# Patient Record
Sex: MALE | Race: WHITE | NOT HISPANIC OR LATINO
[De-identification: names, ages, dates, MRNs, and addresses within clinical notes are randomized per-mention and may not be internally consistent; named-entity substitution may affect disease eponyms.]

---

## 2021-08-25 ENCOUNTER — TRANSCRIPTION ENCOUNTER (OUTPATIENT)
Age: 41
End: 2021-08-25

## 2021-08-26 ENCOUNTER — INPATIENT (INPATIENT)
Facility: HOSPITAL | Age: 41
LOS: 4 days | Discharge: HOME | DRG: 286 | End: 2021-08-31
Attending: THORACIC SURGERY (CARDIOTHORACIC VASCULAR SURGERY) | Admitting: THORACIC SURGERY (CARDIOTHORACIC VASCULAR SURGERY)
Payer: COMMERCIAL

## 2021-08-26 ENCOUNTER — NON-APPOINTMENT (OUTPATIENT)
Age: 41
End: 2021-08-26

## 2021-08-26 ENCOUNTER — APPOINTMENT (OUTPATIENT)
Dept: CARDIOLOGY | Facility: CLINIC | Age: 41
End: 2021-08-26
Payer: COMMERCIAL

## 2021-08-26 ENCOUNTER — LABORATORY RESULT (OUTPATIENT)
Age: 41
End: 2021-08-26

## 2021-08-26 VITALS — DIASTOLIC BLOOD PRESSURE: 68 MMHG | SYSTOLIC BLOOD PRESSURE: 114 MMHG

## 2021-08-26 VITALS
WEIGHT: 169.98 LBS | HEIGHT: 72 IN | HEART RATE: 101 BPM | TEMPERATURE: 99 F | DIASTOLIC BLOOD PRESSURE: 85 MMHG | SYSTOLIC BLOOD PRESSURE: 126 MMHG | OXYGEN SATURATION: 97 % | RESPIRATION RATE: 18 BRPM

## 2021-08-26 VITALS
BODY MASS INDEX: 23.03 KG/M2 | WEIGHT: 170 LBS | HEIGHT: 72 IN | HEART RATE: 95 BPM | SYSTOLIC BLOOD PRESSURE: 120 MMHG | OXYGEN SATURATION: 95 % | DIASTOLIC BLOOD PRESSURE: 70 MMHG

## 2021-08-26 DIAGNOSIS — R07.9 CHEST PAIN, UNSPECIFIED: ICD-10-CM

## 2021-08-26 DIAGNOSIS — I34.0 NONRHEUMATIC MITRAL (VALVE) INSUFFICIENCY: ICD-10-CM

## 2021-08-26 DIAGNOSIS — R50.9 FEVER, UNSPECIFIED: ICD-10-CM

## 2021-08-26 DIAGNOSIS — I30.9 ACUTE PERICARDITIS, UNSPECIFIED: ICD-10-CM

## 2021-08-26 LAB
ALBUMIN SERPL ELPH-MCNC: 3.7 G/DL — SIGNIFICANT CHANGE UP (ref 3.3–5)
ALP SERPL-CCNC: 53 U/L — SIGNIFICANT CHANGE UP (ref 40–120)
ALT FLD-CCNC: 8 U/L — LOW (ref 10–45)
ANION GAP SERPL CALC-SCNC: 13 MMOL/L — SIGNIFICANT CHANGE UP (ref 5–17)
APPEARANCE UR: CLEAR — SIGNIFICANT CHANGE UP
AST SERPL-CCNC: 10 U/L — SIGNIFICANT CHANGE UP (ref 10–40)
BASOPHILS # BLD AUTO: 0.02 K/UL — SIGNIFICANT CHANGE UP (ref 0–0.2)
BASOPHILS NFR BLD AUTO: 0.3 % — SIGNIFICANT CHANGE UP (ref 0–2)
BILIRUB SERPL-MCNC: 0.6 MG/DL — SIGNIFICANT CHANGE UP (ref 0.2–1.2)
BILIRUB UR-MCNC: NEGATIVE — SIGNIFICANT CHANGE UP
BLD GP AB SCN SERPL QL: NEGATIVE — SIGNIFICANT CHANGE UP
BUN SERPL-MCNC: 17 MG/DL — SIGNIFICANT CHANGE UP (ref 7–23)
CALCIUM SERPL-MCNC: 9.1 MG/DL — SIGNIFICANT CHANGE UP (ref 8.4–10.5)
CHLORIDE SERPL-SCNC: 104 MMOL/L — SIGNIFICANT CHANGE UP (ref 96–108)
CK SERPL-CCNC: 61 U/L — SIGNIFICANT CHANGE UP (ref 30–200)
CO2 SERPL-SCNC: 22 MMOL/L — SIGNIFICANT CHANGE UP (ref 22–31)
COLOR SPEC: YELLOW — SIGNIFICANT CHANGE UP
CREAT SERPL-MCNC: 0.91 MG/DL — SIGNIFICANT CHANGE UP (ref 0.5–1.3)
CRP SERPL-MCNC: 55 MG/L — HIGH (ref 0–4)
CRP SERPL-MCNC: 62 MG/L — HIGH (ref 0–4)
D DIMER BLD IA.RAPID-MCNC: 446 NG/ML DDU — HIGH
DIFF PNL FLD: NEGATIVE — SIGNIFICANT CHANGE UP
EOSINOPHIL # BLD AUTO: 0.04 K/UL — SIGNIFICANT CHANGE UP (ref 0–0.5)
EOSINOPHIL NFR BLD AUTO: 0.6 % — SIGNIFICANT CHANGE UP (ref 0–6)
ERYTHROCYTE [SEDIMENTATION RATE] IN BLOOD: 36 MM/HR — HIGH (ref 0–15)
GAS PNL BLDA: SIGNIFICANT CHANGE UP
GLUCOSE SERPL-MCNC: 101 MG/DL — HIGH (ref 70–99)
GLUCOSE UR QL: NEGATIVE — SIGNIFICANT CHANGE UP
HCT VFR BLD CALC: 41.2 % — SIGNIFICANT CHANGE UP (ref 39–50)
HGB BLD-MCNC: 13.8 G/DL — SIGNIFICANT CHANGE UP (ref 13–17)
IMM GRANULOCYTES NFR BLD AUTO: 0.1 % — SIGNIFICANT CHANGE UP (ref 0–1.5)
KETONES UR-MCNC: ABNORMAL
LEUKOCYTE ESTERASE UR-ACNC: NEGATIVE — SIGNIFICANT CHANGE UP
LYMPHOCYTES # BLD AUTO: 0.71 K/UL — LOW (ref 1–3.3)
LYMPHOCYTES # BLD AUTO: 9.8 % — LOW (ref 13–44)
MCHC RBC-ENTMCNC: 29.2 PG — SIGNIFICANT CHANGE UP (ref 27–34)
MCHC RBC-ENTMCNC: 33.5 GM/DL — SIGNIFICANT CHANGE UP (ref 32–36)
MCV RBC AUTO: 87.1 FL — SIGNIFICANT CHANGE UP (ref 80–100)
MONOCYTES # BLD AUTO: 0.41 K/UL — SIGNIFICANT CHANGE UP (ref 0–0.9)
MONOCYTES NFR BLD AUTO: 5.7 % — SIGNIFICANT CHANGE UP (ref 2–14)
NEUTROPHILS # BLD AUTO: 6.06 K/UL — SIGNIFICANT CHANGE UP (ref 1.8–7.4)
NEUTROPHILS NFR BLD AUTO: 83.5 % — HIGH (ref 43–77)
NITRITE UR-MCNC: NEGATIVE — SIGNIFICANT CHANGE UP
NRBC # BLD: 0 /100 WBCS — SIGNIFICANT CHANGE UP (ref 0–0)
NT-PROBNP SERPL-SCNC: 1454 PG/ML — HIGH (ref 0–300)
PH UR: 6.5 — SIGNIFICANT CHANGE UP (ref 5–8)
PLATELET # BLD AUTO: 244 K/UL — SIGNIFICANT CHANGE UP (ref 150–400)
POTASSIUM SERPL-MCNC: 4 MMOL/L — SIGNIFICANT CHANGE UP (ref 3.5–5.3)
POTASSIUM SERPL-SCNC: 4 MMOL/L — SIGNIFICANT CHANGE UP (ref 3.5–5.3)
PROT SERPL-MCNC: 6.3 G/DL — SIGNIFICANT CHANGE UP (ref 6–8.3)
PROT UR-MCNC: 100 — SIGNIFICANT CHANGE UP
RAPID RVP RESULT: SIGNIFICANT CHANGE UP
RBC # BLD: 4.73 M/UL — SIGNIFICANT CHANGE UP (ref 4.2–5.8)
RBC # FLD: 11.6 % — SIGNIFICANT CHANGE UP (ref 10.3–14.5)
RH IG SCN BLD-IMP: POSITIVE — SIGNIFICANT CHANGE UP
RH IG SCN BLD-IMP: POSITIVE — SIGNIFICANT CHANGE UP
SARS-COV-2 RNA SPEC QL NAA+PROBE: SIGNIFICANT CHANGE UP
SARS-COV-2 RNA SPEC QL NAA+PROBE: SIGNIFICANT CHANGE UP
SODIUM SERPL-SCNC: 139 MMOL/L — SIGNIFICANT CHANGE UP (ref 135–145)
SP GR SPEC: >1.05 (ref 1.01–1.02)
TROPONIN T, HIGH SENSITIVITY RESULT: 9 NG/L — SIGNIFICANT CHANGE UP (ref 0–51)
UROBILINOGEN FLD QL: NEGATIVE — SIGNIFICANT CHANGE UP
WBC # BLD: 7.25 K/UL — SIGNIFICANT CHANGE UP (ref 3.8–10.5)
WBC # FLD AUTO: 7.25 K/UL — SIGNIFICANT CHANGE UP (ref 3.8–10.5)

## 2021-08-26 PROCEDURE — 99291 CRITICAL CARE FIRST HOUR: CPT

## 2021-08-26 PROCEDURE — 71046 X-RAY EXAM CHEST 2 VIEWS: CPT | Mod: 26

## 2021-08-26 PROCEDURE — 99205 OFFICE O/P NEW HI 60 MIN: CPT | Mod: 25

## 2021-08-26 PROCEDURE — 93010 ELECTROCARDIOGRAM REPORT: CPT

## 2021-08-26 PROCEDURE — 93306 TTE W/DOPPLER COMPLETE: CPT | Mod: 26

## 2021-08-26 PROCEDURE — 93306 TTE W/DOPPLER COMPLETE: CPT

## 2021-08-26 PROCEDURE — 93010 ELECTROCARDIOGRAM REPORT: CPT | Mod: NC

## 2021-08-26 PROCEDURE — 99222 1ST HOSP IP/OBS MODERATE 55: CPT

## 2021-08-26 PROCEDURE — 71275 CT ANGIOGRAPHY CHEST: CPT | Mod: 26

## 2021-08-26 PROCEDURE — 99223 1ST HOSP IP/OBS HIGH 75: CPT

## 2021-08-26 PROCEDURE — 93000 ELECTROCARDIOGRAM COMPLETE: CPT

## 2021-08-26 PROCEDURE — 99285 EMERGENCY DEPT VISIT HI MDM: CPT

## 2021-08-26 PROCEDURE — 99255 IP/OBS CONSLTJ NEW/EST HI 80: CPT

## 2021-08-26 RX ORDER — FUROSEMIDE 40 MG
20 TABLET ORAL DAILY
Refills: 0 | Status: DISCONTINUED | OUTPATIENT
Start: 2021-08-26 | End: 2021-08-27

## 2021-08-26 RX ORDER — CHLORHEXIDINE GLUCONATE 213 G/1000ML
1 SOLUTION TOPICAL ONCE
Refills: 0 | Status: COMPLETED | OUTPATIENT
Start: 2021-08-26 | End: 2021-08-26

## 2021-08-26 RX ORDER — VANCOMYCIN HCL 1 G
1250 VIAL (EA) INTRAVENOUS EVERY 12 HOURS
Refills: 0 | Status: DISCONTINUED | OUTPATIENT
Start: 2021-08-27 | End: 2021-08-30

## 2021-08-26 RX ORDER — PANTOPRAZOLE SODIUM 20 MG/1
40 TABLET, DELAYED RELEASE ORAL DAILY
Refills: 0 | Status: DISCONTINUED | OUTPATIENT
Start: 2021-08-26 | End: 2021-08-31

## 2021-08-26 RX ORDER — IBUPROFEN 200 MG
600 TABLET ORAL ONCE
Refills: 0 | Status: COMPLETED | OUTPATIENT
Start: 2021-08-26 | End: 2021-08-26

## 2021-08-26 RX ORDER — CEFTRIAXONE 500 MG/1
1000 INJECTION, POWDER, FOR SOLUTION INTRAMUSCULAR; INTRAVENOUS ONCE
Refills: 0 | Status: COMPLETED | OUTPATIENT
Start: 2021-08-26 | End: 2021-08-26

## 2021-08-26 RX ORDER — CEFTRIAXONE 500 MG/1
2000 INJECTION, POWDER, FOR SOLUTION INTRAMUSCULAR; INTRAVENOUS EVERY 24 HOURS
Refills: 0 | Status: DISCONTINUED | OUTPATIENT
Start: 2021-08-27 | End: 2021-08-31

## 2021-08-26 RX ORDER — POLYETHYLENE GLYCOL 3350 17 G/17G
17 POWDER, FOR SOLUTION ORAL DAILY
Refills: 0 | Status: DISCONTINUED | OUTPATIENT
Start: 2021-08-26 | End: 2021-08-31

## 2021-08-26 RX ORDER — PIPERACILLIN AND TAZOBACTAM 4; .5 G/20ML; G/20ML
3.38 INJECTION, POWDER, LYOPHILIZED, FOR SOLUTION INTRAVENOUS ONCE
Refills: 0 | Status: DISCONTINUED | OUTPATIENT
Start: 2021-08-26 | End: 2021-08-26

## 2021-08-26 RX ORDER — CEFTRIAXONE 500 MG/1
INJECTION, POWDER, FOR SOLUTION INTRAMUSCULAR; INTRAVENOUS
Refills: 0 | Status: DISCONTINUED | OUTPATIENT
Start: 2021-08-26 | End: 2021-08-26

## 2021-08-26 RX ORDER — VANCOMYCIN HCL 1 G
1000 VIAL (EA) INTRAVENOUS ONCE
Refills: 0 | Status: COMPLETED | OUTPATIENT
Start: 2021-08-26 | End: 2021-08-26

## 2021-08-26 RX ORDER — ENOXAPARIN SODIUM 100 MG/ML
40 INJECTION SUBCUTANEOUS DAILY
Refills: 0 | Status: DISCONTINUED | OUTPATIENT
Start: 2021-08-26 | End: 2021-08-30

## 2021-08-26 RX ORDER — FUROSEMIDE 40 MG
20 TABLET ORAL ONCE
Refills: 0 | Status: COMPLETED | OUTPATIENT
Start: 2021-08-26 | End: 2021-08-26

## 2021-08-26 RX ORDER — SODIUM CHLORIDE 9 MG/ML
3 INJECTION INTRAMUSCULAR; INTRAVENOUS; SUBCUTANEOUS EVERY 8 HOURS
Refills: 0 | Status: DISCONTINUED | OUTPATIENT
Start: 2021-08-26 | End: 2021-08-31

## 2021-08-26 RX ORDER — CHLORHEXIDINE GLUCONATE 213 G/1000ML
15 SOLUTION TOPICAL ONCE
Refills: 0 | Status: DISCONTINUED | OUTPATIENT
Start: 2021-08-26 | End: 2021-08-29

## 2021-08-26 RX ORDER — SPIRONOLACTONE 25 MG/1
25 TABLET, FILM COATED ORAL DAILY
Refills: 0 | Status: DISCONTINUED | OUTPATIENT
Start: 2021-08-26 | End: 2021-08-27

## 2021-08-26 RX ADMIN — SODIUM CHLORIDE 3 MILLILITER(S): 9 INJECTION INTRAMUSCULAR; INTRAVENOUS; SUBCUTANEOUS at 21:48

## 2021-08-26 RX ADMIN — Medication 600 MILLIGRAM(S): at 13:28

## 2021-08-26 RX ADMIN — CEFTRIAXONE 100 MILLIGRAM(S): 500 INJECTION, POWDER, FOR SOLUTION INTRAMUSCULAR; INTRAVENOUS at 14:21

## 2021-08-26 RX ADMIN — Medication 600 MILLIGRAM(S): at 13:58

## 2021-08-26 RX ADMIN — Medication 20 MILLIGRAM(S): at 23:15

## 2021-08-26 RX ADMIN — Medication 20 MILLIGRAM(S): at 17:59

## 2021-08-26 RX ADMIN — Medication 250 MILLIGRAM(S): at 15:23

## 2021-08-26 RX ADMIN — PIPERACILLIN AND TAZOBACTAM 200 GRAM(S): 4; .5 INJECTION, POWDER, LYOPHILIZED, FOR SOLUTION INTRAVENOUS at 13:37

## 2021-08-26 RX ADMIN — SPIRONOLACTONE 25 MILLIGRAM(S): 25 TABLET, FILM COATED ORAL at 17:59

## 2021-08-26 RX ADMIN — CHLORHEXIDINE GLUCONATE 1 APPLICATION(S): 213 SOLUTION TOPICAL at 21:48

## 2021-08-26 RX ADMIN — CEFTRIAXONE 100 MILLIGRAM(S): 500 INJECTION, POWDER, FOR SOLUTION INTRAMUSCULAR; INTRAVENOUS at 18:00

## 2021-08-26 NOTE — H&P ADULT - PROBLEM SELECTOR PLAN 1
admit to  Dr Rodrigues  SD[facility] [place]  Beta blocker on hold   CATHY  ID Consult- Dr Dwyer admit to  Dr Rodrigues  SD[facility] [place]  Flail posterior mitral leaflet - appears to be a torn  chordae attached.*Torrential, severe, anteriorly-directed mitral  regurgitation.*  Beta blocker on hold   CATHY  ID Consult- Dr Dwyer

## 2021-08-26 NOTE — REVIEW OF SYSTEMS
[Negative] : Heme/Lymph [SOB] : no shortness of breath [Dyspnea on exertion] : not dyspnea during exertion

## 2021-08-26 NOTE — H&P ADULT - HISTORY OF PRESENT ILLNESS
This is  a well groomed well developed 40y/o  male in general good health. Developed fever t max 104 x 48hrs presented to Urgent care on cardiac exam found to have + Murmur.  Seen by Dr. Lisker ( Cardiologist) this am transthoracic ECHO reveals  flail mitral leaflet instructed to present to [facility] ED for MV endocarditis.  Denies travel, Covid, sick contacts CP/PALPs and  dental work. Denies N/V/CP. Endorses orthopnea.   Admitted to Dr Rodrigues for  further management.  This is  a well groomed well developed 42y/o  male in general good health. Developed fever t max 104 x 48hrs presented to Urgent care on cardiac exam found to have + Murmur.  Seen by Dr. Lisker ( Cardiologist) this am transthoracic ECHO reveals  severe  mitral prolapse  instructed to present to [facility] ED for MV endocarditis.  Denies travel, Covid, sick contacts CP/PALPs and  dental work. Denies N/V/CP. Endorses orthopnea.   Admitted to Dr Rodrigues for  further management.  This is  a well groomed well developed 42y/o  male in general good health. Developed fever t max 104 x 48hrs presented to Urgent care on cardiac exam found to have + Murmur.  Seen by Dr. Lisker ( Cardiologist) this am transthoracic ECHO reveals  severe  mitral  prolapse  instructed to present to [facility] ED for MV endocarditis.  Denies travel, Covid, sick contacts CP/PALPs and  dental work. Denies N/V/CP. Endorses orthopnea.   Admitted to Dr Rodrigues for  further management.  This is  a well groomed well developed 40y/o  male in general good health. Developed fever t max 104 x 48hrs presented to Urgent care on cardiac exam found to have + Murmur.  Seen by Dr. Lisker ( Cardiologist) this am transthoracic ECHO reveals  severe  mitral  torn anterior chordae   severe  torrential MR   instructed to present to [facility] ED for MV endocarditis.  Denies travel, Covid, sick contacts CP/PALPs and  dental work. Denies N/V/CP. Endorses orthopnea.   Admitted to Dr Rodrigues for  further management.

## 2021-08-26 NOTE — ED CLERICAL - NS ED CLERK NOTE PRE-ARRIVAL INFORMATION; ADDITIONAL PRE-ARRIVAL INFORMATION
CC/Reason For referral:  r/o endocarditis, needs blood cultures  Preferred Consultant(if applicable):  Who admits for you (if needed):  Do you have documents you would like to fax over?  Would you still like to speak to an ED attending?  please call after patient is seen

## 2021-08-26 NOTE — ED PROVIDER NOTE - ATTENDING CONTRIBUTION TO CARE
40yo M denies pmhx presents to ER sent by cardiologist Dr. Jay Lisker for abnormal echo.  Patient reports about 1 week ago began having fevers (Tmax 104F), generalized chest tightness and SOB for the past week, postional, vss, well appearing, vss, ekg nl, seen by cards for admission for endocarditis work up with murmur and valve issue seen on outpt echo, iv abx, cultures ordered, labs.

## 2021-08-26 NOTE — H&P ADULT - ASSESSMENT
This is  a well groomed well developed 40y/o  male in general good health. Developed fever t max 104 x 48hrs presented to Urgent care on cardiac exam found to have + Murmur.  Seen by Dr. Lisker ( Cardiologist) this am transthoracic ECHO reveals  flail mitral leaflet instructed to present to [facility] ED for MV endocarditis.  Denies travel, Covid, sick contacts CP/PALPs and  dental work. Denies N/V/CP. Endorses orthopnea.   Admitted to Dr Rodrigues for  further management.   Seen and examined in no acute distress.  Dr Dwyer ID consulted blood cultures x 2 ordered empiric antibiotics Vancomycin and Zosyn.  Beta blocker on hold allow permissive tachycardia.  Plan of care discussed with patient and Dr Rodrigues     This is  a well groomed well developed 42y/o  male in general good health. Developed fever t max 104 x 48hrs presented to Urgent care on cardiac exam found to have + Murmur.  Seen by Dr. Lisker ( Cardiologist) this am transthoracic ECHO reveals severe mitral prolapse leaflet instructed to present to [facility] ED for MV endocarditis.  Denies travel, Covid, sick contacts CP/PALPs and  dental work. Denies N/V/CP. Endorses orthopnea.   Admitted to Dr Rodrigues for  further management.   Seen and examined in no acute distress.  Dr Dwyer ID consulted blood cultures x 2 ordered empiric antibiotics Vancomycin and Zosyn.  Beta blocker on hold allow permissive tachycardia.  Plan of care discussed with patient and Dr Rodrigues     This is  a well groomed well developed 40y/o  male in general good health. Developed fever t max 104 x 48hrs presented to Urgent care on cardiac exam found to have + Murmur.  Seen by Dr. Lisker ( Cardiologist) this am transthoracic ECHO reveals severe mitral prolapse leaflet instructed to present to [facility] ED for MV endocarditis.  Denies travel, Covid, sick contacts CP/PALPs and  dental work. Denies N/V/CP. Endorses orthopnea.   Admitted to Dr Rodrigues for  further management.   Seen and examined in no acute distress.  Dr Dwyer ID consulted blood cultures x 2 ordered empiric antibiotics Vancomycin and Zosyn.  Beta blocker on hold allow permissive tachycardia.  Plan of care discussed with patient and Dr Rodrigues      Attending statement  Examined and interviewed patient.  plan to start antibiotics and plan on valve surgery when optimized    Dennis Rodrigues MD     This is  a well groomed well developed 42y/o  male in general good health. Developed fever t max 104 x 48hrs presented to Urgent care on cardiac exam found to have + Murmur.  Seen by Dr. Lisker ( Cardiologist) this am transthoracic ECHO reveals severe mitral prolapse leaflet instructed to present to [facility] ED for MV endocarditis.  Denies travel, Covid, sick contacts CP/PALPs and  dental work. Denies N/V/CP. Endorses orthopnea.   Admitted to Dr Rodrigues for  further management.   Seen and examined in no acute distress.  Dr Dwyer ID consulted blood cultures x 2 ordered empiric antibiotics Vancomycin and Ceftriaxone  Beta blocker on hold allow permissive tachycardia.  Plan of care discussed with patient and Dr Rodrigues      Attending statement  Examined and interviewed patient.  plan to start antibiotics and plan on valve surgery when optimized    Dennis Rodrigues MD     This is  a well groomed well developed 42y/o  male in general good health. Developed fever t max 104 x 48hrs presented to Urgent care on cardiac exam found to have + Murmur.  Seen by Dr. Lisker ( Cardiologist) this am transthoracic ECHO reveals severe mitral prolapse leaflet instructed to present to [facility] ED for MV endocarditis.   Received Pfizer COVID Vaccinex2 last dose April -Denies travel, Covid, sick contacts CP/PALPs and  dental work. Denies N/V/CP.  Endorses orthopnea.   Admitted to Dr Rodrigues for  further management.   Seen and examined in no acute distress.  Dr Dwyer ID consulted blood cultures x 2 ordered empiric antibiotics Vancomycin and Ceftriaxone  Beta blocker on hold allow permissive tachycardia.  Plan of care discussed with patient and Dr Rodrigues      Attending statement  Examined and interviewed patient.  plan to start antibiotics and plan on valve surgery when optimized    Dennis Rodrigues MD     This is  a well groomed well developed 40y/o  male in general good health. Developed fever t max 104 x 48hrs presented to Urgent care on cardiac exam found to have + Murmur.  Seen by Dr. Lisker ( Cardiologist) this am transthoracic ECHO reveals severe Flail posterior mitral leaflet - appears to be a torn chordae attached.*Torrential, severe, anteriorly-directed mitral  regurgitation.*  instructed to present to [facility] ED for MV endocarditis.   Received Pfizer COVID Vaccinex2 last dose April -Denies travel, Covid, sick contacts CP/PALPs and  dental work. Denies N/V/CP.  Endorses orthopnea.   Admitted to Dr Rodrigues for  further management.   Seen and examined in no acute distress.  Dr Dwyer ID consulted blood cultures x 2 ordered empiric antibiotics Vancomycin and Ceftriaxone  Beta blocker on hold allow permissive tachycardia.  Plan of care discussed with patient and Dr Rodrigues      Attending statement  Examined and interviewed patient.  plan to start antibiotics and plan on valve surgery when optimized    Dennis Rodrigues MD

## 2021-08-26 NOTE — H&P ADULT - NSHPPHYSICALEXAM_GEN_ALL_CORE
Constitutional:  Well developed, well nourished  Eyes: EOMI,PERRL  ENMT: WDL  Neck: no bruits ,no thyromegaly  Breasts:not examined  Back: no deformities no kyphosis  Respiratory: Breath  sounds equal & clear throughout  Cardiovascular:  S1 S2 RRR + 2/6  murmurs rubs  Gastrointestinal: oft nontender positive bowels sounds   Genitourinary: not examined  Rectal: not examined  Extremities: + pedal pulse no edema  Vascular: Equal and normal throughout  Neurological :Alert and oriented no focal deficits  Skin: normal no rashes   Lymph Nodes: non tender   Musculoskeletal: equal  strength throughout

## 2021-08-26 NOTE — PHYSICAL EXAM
[Well Developed] : well developed [Well Nourished] : well nourished [No Acute Distress] : no acute distress [Normal Conjunctiva] : normal conjunctiva [Normal Venous Pressure] : normal venous pressure [Normal S1, S2] : normal S1, S2 [Clear Lung Fields] : clear lung fields [Good Air Entry] : good air entry [Soft] : abdomen soft [Non Tender] : non-tender [Normal Gait] : normal gait [No Edema] : no edema [No Rash] : no rash [Moves all extremities] : moves all extremities [Alert and Oriented] : alert and oriented [Normal memory] : normal memory [de-identified] : There is a loud diffuse holosystolic murmur throughout the precordium. [de-identified] : Decreased breath sounds at the bases bilaterally.

## 2021-08-26 NOTE — H&P ADULT - NSHPLABSRESULTS_GEN_ALL_CORE
Vital Signs Last 24 Hrs  T(C): 37.4 (26 Aug 2021 12:09), Max: 37.4 (26 Aug 2021 12:09)  T(F): 99.3 (26 Aug 2021 12:09), Max: 99.3 (26 Aug 2021 12:09)  HR: 101 (26 Aug 2021 12:09) (101 - 101)  BP: 126/85 (26 Aug 2021 12:09) (126/85 - 126/85)  RR: 18 (26 Aug 2021 12:09) (18 - 18)  SpO2: 97% (26 Aug 2021 12:09) (97% - 97%) Vital Signs Last 24 Hrs  T(C): 37.4 (26 Aug 2021 12:09), Max: 37.4 (26 Aug 2021 12:09)  T(F): 99.3 (26 Aug 2021 12:09), Max: 99.3 (26 Aug 2021 12:09)  HR: 101 (26 Aug 2021 12:09) (101 - 101)  BP: 126/85 (26 Aug 2021 12:09) (126/85 - 126/85)  RR: 18 (26 Aug 2021 12:09) (18 - 18)  SpO2: 97% (26 Aug 2021 12:09) (97% - 97%)    < from: Transthoracic Echocardiogram (08.26.21 @ 15:11) >    Conclusions:  1. Flail posterior mitral leaflet - appears to be a torn  chordae attached.  *Torrential, severe, anteriorly-directed mitral  regurgitation.*  2. Severely dilated left atrium.  LA volume index = 84  cc/m2.  3. Normal left ventricular internal dimensions and wall  thicknesses.  4. Normal left ventricular systolic function. No segmental  wall motion abnormalities.  5. Normal right ventricular size and function.  6. Bilateral pleural effusions.  *** No previous Echo exam.    < end of copied text >

## 2021-08-26 NOTE — PROGRESS NOTE ADULT - SUBJECTIVE AND OBJECTIVE BOX
Patient seen and examined at the bedside.    Remained critically ill on continuous ICU monitoring.    OBJECTIVE:  Vital Signs Last 24 Hrs  T(C): 36.7 (26 Aug 2021 15:30), Max: 37.4 (26 Aug 2021 12:09)  T(F): 98.1 (26 Aug 2021 15:30), Max: 99.3 (26 Aug 2021 12:09)  HR: 96 (26 Aug 2021 16:00) (96 - 101)  BP: 113/72 (26 Aug 2021 16:00) (113/72 - 134/89)  BP(mean): 87 (26 Aug 2021 16:00) (87 - 91)  RR: 26 (26 Aug 2021 16:00) (18 - 31)  SpO2: 94% (26 Aug 2021 16:00) (92% - 97%)    REVIEW OF SYSTEMS:  Constitutional:     [] negative [x] fevers [] chills []fatigue            HEENT:               [x] negative [] difficulty hearing []vertigo []epistaxis     CV:                     [] negative [x] chest pain[] incisional chest pain [] edema                 Resp:                  [] negative [] wheezing [x] SOB [] cough [] hemoptysis    GI:                      [x] negative [] nausea [] vomiting []  diarrhea[] constipation [] melena         :                     [x] negative [ ] hematuria [ ] dysuria[ ] urgency [] incontinence         Musculoskeletal:  [x] negative [ ] back pain [ ] myalgias [ ] arthralgias [ ] fracture  Skin:                   [x] negative [ ] rash [ ] itch  Neurological:      [x] negative []seizures []syncope []confusion    [x] All other systems negative  [ ] Unable to assess ROS due to    Physical Exam:  General:   Neurology: A&Ox3, nonfocal, MADDEN x 4  Eyes: PERRLA/ EOMI, Gross vision intact  ENT/Neck: Neck supple, trachea midline, No JVD, Gross hearing intact  Respiratory: No wheezing, rhonchi. Rales noted bilaterally.  CV: RRR, S1S2, no murmurs, rubs or gallops        [] Sternal dressing, [] Mediastinal CT, [] Pleural CT, [] PATRICIA drain        [x] Sinus rhythm, [] Afib, [] Temporary pacing, [] PPM  Abdominal: Soft, NT, ND +BS,   Extremities: 1-2+ pedal edema noted, + peripheral pulses  Skin: No Rashes, Hematoma, Ecchymosis                         LINES:  [ ] Arterial Line   [ ] Central Line [x] Peripheral Intravenous Line  [ ] PA catheter  [ ] IABP  [ ] ECMO  [ ] LVAD  [ ] Ventilator  [ ] pacemaker [ ] Impella      RADIOLOGY:    Assessment:  40y/o  male in general good health, no previous medical history.     Endocarditis Preop MVR  Mitral valve incompetence    Plan:   ***Neuro***  [x] Nonfocal  [] Sedated  [] Motor Strength  [] Speech    ***Cardiovascular***  Endocarditis Preop MVR  Severe MR on outpatient echo    Hematocrit 41.2%   Transfuse pRBCs if remain hemodynamically unstable   SR / no back up pacing / monitor for Rhythm abnormalities    Hemodynamic lability, instability. Requires IVCD []  []Epi  []Primacor  []Levo  [] Vaso  [] Cardene  [] Nitroprusside  Anticoagulation: [] Heparin  [] Argatroban [x] Lovenox  [] Coumadin // [] HIT positive [] ABRAHAM positive  Antiplatelet therapy: [] ASA  [] Brilinta  [] Plavix   Chest tubes: [] actively bleeding [x] non-bleeding    CHF- acute [ ]   chronic [ ]    systolic [x]   diastolic [ ]          - Echo- EF -     59%      ***Pulmonary***  Ventilator Management:  []AC-rest  [] CPAP-PS Wean  []Trach Collar  []Extubate  [] T-Piece  []peep>5             Receiving supplemental O2 therapy with NC LPM: 2  Continue to monitor RR, breathing pattern, pulse ox, and ABG's.  Encourage incentive spirometry.     ***GI***  Stress ulcer prophylaxis: [x] Protonix  [] Pepcid  NPO for preop MVR.    ***Renal***  Continue to monitor I/Os, BUN/Creatinine.   Replete lytes PRN. Keep K> 4 and Mg >2.    I&O's Summary    ***ID***  TMAX: 99.3F, WBC within normal limits.   Empiric  Ceftriaxone for persistent fevers.     ***Endocrine***  [] Hyperglycemia  [] DM2 [] DM1 [] Prediabetes : HbA1c ____%             - [] Insulin gtt  [] ISS  [] NPH  [] Lantus             - Need tight glycemic control to prevent wound infection.    [] Acquired Hypothyroidism: Continue with Synthroid.      Patient requires continuous monitoring with bedside rhythm monitoring, pulse oximetry monitoring, and continuous central venous and arterial pressure monitoring; and intermittent blood gas analysis. Care plan discussed with the ICU care team.   Patient remained critical, at risk for life threatening decompensation.    I have spent 30 minutes providing critical care management to this patient.    By signing my name below, I, Lily Hou, attest that this documentation has been prepared under the direction and in the presence of Tani Jang MD   Electronically signed: Jarrod Francois, 21 @ 17:23    I, Tani Jang, personally performed the services described in this documentation. all medical record entries made by the scribe were at my direction and in my presence. I have reviewed the chart and agree that the record reflects my personal performance and is accurate and complete  Electronically signed: Tani Jang MD  Patient seen and examined at the bedside.    Remained critically ill on continuous ICU monitoring.    OBJECTIVE:  Vital Signs Last 24 Hrs  T(C): 36.7 (26 Aug 2021 15:30), Max: 37.4 (26 Aug 2021 12:09)  T(F): 98.1 (26 Aug 2021 15:30), Max: 99.3 (26 Aug 2021 12:09)  HR: 96 (26 Aug 2021 16:00) (96 - 101)  BP: 113/72 (26 Aug 2021 16:00) (113/72 - 134/89)  BP(mean): 87 (26 Aug 2021 16:00) (87 - 91)  RR: 26 (26 Aug 2021 16:00) (18 - 31)  SpO2: 94% (26 Aug 2021 16:00) (92% - 97%)    REVIEW OF SYSTEMS:    [ ] Unable to assess ROS due to    Physical Exam:  General:   Neurology: A&Ox3, nonfocal, MADDEN x 4  Eyes: PERRLA/ EOMI, Gross vision intact  ENT/Neck: Neck supple, trachea midline, No JVD, Gross hearing intact  Respiratory: No wheezing, rhonchi. Rales noted bilaterally.  CV: RRR, S1S2, no murmurs, rubs or gallops        [] Sternal dressing, [] Mediastinal CT, [] Pleural CT, [] APTRICIA drain        [x] Sinus rhythm, [] Afib, [] Temporary pacing, [] PPM  Abdominal: Soft, NT, ND +BS,   Extremities: 1-2+ pedal edema noted, + peripheral pulses  Skin: No Rashes, Hematoma, Ecchymosis                         LINES:  [ ] Arterial Line   [ ] Central Line [x] Peripheral Intravenous Line  [ ] PA catheter  [ ] IABP  [ ] ECMO  [ ] LVAD  [ ] Ventilator  [ ] pacemaker [ ] Impella      RADIOLOGY:    Assessment:  40y/o  male in general good health, no previous medical history.     Endocarditis Preop MVR  Mitral valve incompetence    Plan:   ***Neuro***  [x] Nonfocal  [] Sedated  [] Motor Strength  [] Speech    ***Cardiovascular***  Endocarditis Preop MVR  Severe MR on outpatient echo    Hematocrit 41.2%   Transfuse pRBCs if remain hemodynamically unstable   SR / no back up pacing / monitor for Rhythm abnormalities    Hemodynamic lability, instability. Requires IVCD []  []Epi  []Primacor  []Levo  [] Vaso  [] Cardene  [] Nitroprusside  Anticoagulation: [] Heparin  [] Argatroban [x] Lovenox  [] Coumadin // [] HIT positive [] ABRAHAM positive  Antiplatelet therapy: [] ASA  [] Brilinta  [] Plavix   Chest tubes: [] actively bleeding [x] non-bleeding    CHF- acute [ ]   chronic [ ]    systolic [x]   diastolic [ ]          - Echo- EF -     59%      ***Pulmonary***  Ventilator Management:  []AC-rest  [] CPAP-PS Wean  []Trach Collar  []Extubate  [] T-Piece  []peep>5             Receiving supplemental O2 therapy with NC LPM: 2  Continue to monitor RR, breathing pattern, pulse ox, and ABG's.  Encourage incentive spirometry.     ***GI***  Stress ulcer prophylaxis: [x] Protonix  [] Pepcid  NPO for preop MVR.    ***Renal***  Continue to monitor I/Os, BUN/Creatinine.   Replete lytes PRN. Keep K> 4 and Mg >2.    I&O's Summary    ***ID***  TMAX: 99.3F, WBC within normal limits.   Empiric  Ceftriaxone for persistent fevers.     ***Endocrine***  [] Hyperglycemia  [] DM2 [] DM1 [] Prediabetes : HbA1c ____%             - [] Insulin gtt  [] ISS  [] NPH  [] Lantus             - Need tight glycemic control to prevent wound infection.    [] Acquired Hypothyroidism: Continue with Synthroid.      Patient requires continuous monitoring with bedside rhythm monitoring, pulse oximetry monitoring, and continuous central venous and arterial pressure monitoring; and intermittent blood gas analysis. Care plan discussed with the ICU care team.   Patient remained critical, at risk for life threatening decompensation.    I have spent 30 minutes providing critical care management to this patient.    By signing my name below, I, Lily Hou, attest that this documentation has been prepared under the direction and in the presence of Tani Jang MD   Electronically signed: Jarrod Francois, 21 @ 17:23    I, Tani Jang, personally performed the services described in this documentation. all medical record entries made by the belaibe were at my direction and in my presence. I have reviewed the chart and agree that the record reflects my personal performance and is accurate and complete  Electronically signed: Tani Jang MD  Patient seen and examined at the bedside.    Remained critically ill on continuous ICU monitoring.    OBJECTIVE:  Vital Signs Last 24 Hrs  T(C): 36.7 (26 Aug 2021 15:30), Max: 37.4 (26 Aug 2021 12:09)  T(F): 98.1 (26 Aug 2021 15:30), Max: 99.3 (26 Aug 2021 12:09)  HR: 96 (26 Aug 2021 16:00) (96 - 101)  BP: 113/72 (26 Aug 2021 16:00) (113/72 - 134/89)  BP(mean): 87 (26 Aug 2021 16:00) (87 - 91)  RR: 26 (26 Aug 2021 16:00) (18 - 31)  SpO2: 94% (26 Aug 2021 16:00) (92% - 97%)    REVIEW OF SYSTEMS:    + SOB at rest / Fevers     Physical Exam:  General: in bed on continuous ICU observation   Neurology: A&Ox3, nonfocal, MADDEN x 4  Eyes: PERRLA/ EOMI, Gross vision intact  ENT/Neck: Neck supple, trachea midline, +JVD, Gross hearing intact  Respiratory: Rales noted bilaterally.  CV: RRR, S1S2,  + holosystolic murmur @ apex         Abdominal: Soft, NT, ND +BS,   Extremities: 1-2+ pedal edema noted, + peripheral pulses  Skin: No Rashes, Hematoma, Ecchymosis                         LINES:  [ ] Arterial Line   [ ] Central Line [x] Peripheral Intravenous Line  [ ] PA catheter  [ ] IABP  [ ] ECMO  [ ] LVAD  [ ] Ventilator  [ ] pacemaker [ ] Impella      RADIOLOGY:    Assessment:  42y/o  male in general good health, no previous medical history.     Endocarditis Preop MVR  Mitral valve incompetence    Plan:   ***Neuro***  [x] Nonfocal  [] Sedated  [] Motor Strength  [] Speech    ***Cardiovascular***  Endocarditis Preop MVR  Severe MR on outpatient echo    Hematocrit 41.2%   Transfuse pRBCs if remain hemodynamically unstable   SR / no back up pacing / monitor for Rhythm abnormalities    Hemodynamic lability, instability. Requires IVCD []  []Epi  []Primacor  []Levo  [] Vaso  [] Cardene  [] Nitroprusside  Anticoagulation: [] Heparin  [] Argatroban [x] Lovenox  [] Coumadin // [] HIT positive [] ABRAHAM positive  Antiplatelet therapy: [] ASA  [] Brilinta  [] Plavix   Chest tubes: [] actively bleeding [x] non-bleeding    CHF- acute [ ]   chronic [ ]    systolic [x]   diastolic [ ]          - Echo- EF -     59%      ***Pulmonary***  Ventilator Management:  []AC-rest  [] CPAP-PS Wean  []Trach Collar  []Extubate  [] T-Piece  []peep>5             Receiving supplemental O2 therapy with NC LPM: 2  Continue to monitor RR, breathing pattern, pulse ox, and ABG's.  Encourage incentive spirometry.     ***GI***  Stress ulcer prophylaxis: [x] Protonix  [] Pepcid  NPO for preop MVR.    ***Renal***  Continue to monitor I/Os, BUN/Creatinine.   Replete lytes PRN. Keep K> 4 and Mg >2.    I&O's Summary    ***ID***  TMAX: 99.3F, WBC within normal limits.   Empiric  Ceftriaxone for persistent fevers.     ***Endocrine***  [] Hyperglycemia  [] DM2 [] DM1 [] Prediabetes : HbA1c ____%             - [] Insulin gtt  [] ISS  [] NPH  [] Lantus             - Need tight glycemic control to prevent wound infection.    [] Acquired Hypothyroidism: Continue with Synthroid.      Patient requires continuous monitoring with bedside rhythm monitoring, pulse oximetry monitoring, and continuous central venous and arterial pressure monitoring; and intermittent blood gas analysis. Care plan discussed with the ICU care team.   Patient remained critical, at risk for life threatening decompensation.    I have spent 30 minutes providing critical care management to this patient.    By signing my name below, I, Lily Hou, attest that this documentation has been prepared under the direction and in the presence of Tani Jang MD   Electronically signed: Jarrod Francois, 21 @ 17:23    I, Tani Jang, personally performed the services described in this documentation. all medical record entries made by the scribe were at my direction and in my presence. I have reviewed the chart and agree that the record reflects my personal performance and is accurate and complete  Electronically signed: Tani Jang MD  Patient seen and examined at the bedside.    Remained critically ill on continuous ICU monitoring.    OBJECTIVE:  Vital Signs Last 24 Hrs  T(C): 36.7 (26 Aug 2021 15:30), Max: 37.4 (26 Aug 2021 12:09)  T(F): 98.1 (26 Aug 2021 15:30), Max: 99.3 (26 Aug 2021 12:09)  HR: 96 (26 Aug 2021 16:00) (96 - 101)  BP: 113/72 (26 Aug 2021 16:00) (113/72 - 134/89)  BP(mean): 87 (26 Aug 2021 16:00) (87 - 91)  RR: 26 (26 Aug 2021 16:00) (18 - 31)  SpO2: 94% (26 Aug 2021 16:00) (92% - 97%)    REVIEW OF SYSTEMS:    + SOB at rest / Fevers     Physical Exam:  General: in bed on continuous ICU observation   Neurology: A&Ox3, nonfocal, MADDEN x 4  Eyes: PERRLA/ EOMI, Gross vision intact  ENT/Neck: Neck supple, trachea midline, +JVD, Gross hearing intact  Respiratory: Rales noted bilaterally.  CV: RRR, S1S2,  + holosystolic murmur @ apex         Abdominal: Soft, NT, ND +BS,   Extremities: 1-2+ pedal edema noted, + peripheral pulses  Skin: No Rashes, Hematoma, Ecchymosis                               RADIOLOGY:  congestive heart failure       Assessment:  42y/o  male  with worsening SOB / Fever    Severe MR / Valvular heart failure   Febrile suspected endocarditis         Plan:              Close respiratory monitoring, Severe MR with warning signs of SOB / Spo2 87% RA / CXR Pulmonary edema with B/L effusion       Supplemental 02 / f/u SPO2 / baseline ABG / CXR        Monitor BP / given CXR / Symptoms will start Diuresis / strict I/O       BC X2        Rocephin / Vanco as per ID              Patient at risk of abrupt decompensation in event will require IABP placement        Continue ICU monitoring              Patient requires continuous monitoring with bedside rhythm monitoring, pulse oximetry monitoring, and continuous central venous and arterial pressure monitoring; and intermittent blood gas analysis. Care plan discussed with the ICU care team.   Patient remained critical, at risk for life threatening decompensation.    I have spent 30 minutes providing critical care management to this patient.    By signing my name below, I, Lily Ameya, attest that this documentation has been prepared under the direction and in the presence of Tani Jang MD   Electronically signed: Jarrod Francois, 08-26-21 @ 17:23    I, Tani Jang, personally performed the services described in this documentation. all medical record entries made by the scribe were at my direction and in my presence. I have reviewed the chart and agree that the record reflects my personal performance and is accurate and complete  Electronically signed: Tani Jang MD

## 2021-08-26 NOTE — H&P ADULT - NSHPREVIEWOFSYSTEMS_GEN_ALL_CORE
GENERAL SYMPTOMS: , fevers or chills  ENT: No visual changes;  No vertigo or throat pain   SKIN/BREAST: Negative  NECK: No pain or stiffness  RESPIRATORY/THORAX: No cough, wheezing, hemoptysis;  Orthopnea -shortness of breath  CARDIOVASCULAR: No chest pain or palpitations  GASTROINTESTINAL: No abdominal or epigastric pain. No nausea, vomiting, or hematemesis; No diarrhea or constipation.   GENITOURINARY: No dysuria, frequency or hematuria  NEUROLOGICAL: No numbness or weakness  MUSCULOSKELETAL: equal strength throughout   SKIN: No itching, burning, rashes, or lesions   All other review of systems is negative unless indicated above.  HEMATOLOGY/LYMPHATICS: Negative  ENDOCRINE: Negative

## 2021-08-26 NOTE — PHYSICAL EXAM
[Well Developed] : well developed [Well Nourished] : well nourished [No Acute Distress] : no acute distress [Normal Conjunctiva] : normal conjunctiva [Normal Venous Pressure] : normal venous pressure [Normal S1, S2] : normal S1, S2 [Clear Lung Fields] : clear lung fields [Good Air Entry] : good air entry [Soft] : abdomen soft [Non Tender] : non-tender [Normal Gait] : normal gait [No Edema] : no edema [No Rash] : no rash [Moves all extremities] : moves all extremities [Alert and Oriented] : alert and oriented [Normal memory] : normal memory [de-identified] : There is a loud diffuse holosystolic murmur throughout the precordium. [de-identified] : Decreased breath sounds at the bases bilaterally.

## 2021-08-26 NOTE — CONSULT NOTE ADULT - ASSESSMENT
41M previously healthy, admitted 8/26/21 for flail MV in the setting orthopnea and a few days of fever.   IE is possible although unclear mechanism, no clear risk factor.   Ischemic causes seem unlikely in a young healthy person.   May have had previous MVR - was told he had a murmur before.   Nontoxic, hemodynamically stable, not hypoxic.     Suggest  -two sets of blood cultures followed by empiric Ceftriaxone 2GM IV q24h and Vanc 1.25GM IV q12h   -echocardiogram   -I ordered a routine HIV screen for tomorrow     Discussed with ED and CT surgery     Star Dwyer MD   Infectious Disease   Pager [phone]   After 5PM and on weekends please page fellow on call or call [phone]

## 2021-08-26 NOTE — DISCUSSION/SUMMARY
[FreeTextEntry1] : He is a 41-year-old with recent orthopnea and chest heaviness and a remote history of a cardiac murmur who presents with fevers.\par Urgent echocardiogram showed severe mitral regurgitation with prolapse and flail of the posterior leaflet with left atrial enlargement and bilateral pleural effusions.  Initial concern for pericarditis is not supported by these findings.\par We reviewed the pathophysiology of mitral valve prolapse and the likelihood is that he had underlying prolapse that became flail more recently.  He has signs of CHF on examination.\par Due to his fevers I have concerned about subacute endocarditis and therefore I have asked him to go to the hospital for evaluation and management of his valvular issue.\par His case was reviewed with Dr. Rodrigues.\par I reviewed these findings as well as the need for surgery in the near future with him as well as with his wife over the telephone.

## 2021-08-26 NOTE — H&P ADULT - PROBLEM SELECTOR PLAN 2
Blood cultures   Empiric  Vancomycin and Zosyn  ID consulted -Dr Dwyer Blood cultures   Empiric  Vancomycin and Ceftriaxone  ID consulted -Dr Dwyer

## 2021-08-26 NOTE — ED PROVIDER NOTE - OBJECTIVE STATEMENT
40yo M denies pmhx presents to ER sent by cardiologist Dr. Jay Lisker for abnormal echo.  Patient reports about 1 week ago began having fevers (Tmax 104F), generalized chest tightness and SOB, symptoms exacerbated when laying flat at night and in the mornings.  Went to an urgent care to get COVID19 testing which was negative, was told by urgent care provider had a heart murmur and to follow up with cardiologist.  Saw Jay Lisker in office today had echo at bedside was told has "valve issue" and to go to ER.  Has been fully vaccinated against COVID19.  Denies leg swelling, recent travel, rash, IVDU, n/v/d, dental pain, sore throat.

## 2021-08-26 NOTE — ED PROVIDER NOTE - ENMT NEGATIVE STATEMENT, MLM
. Nose: no nasal congestion and no nasal drainage. Mouth/Throat: no dysphagia, no hoarseness and no throat pain. Neck: no lumps, no pain, no stiffness and no swollen glands.

## 2021-08-26 NOTE — ED ADULT NURSE NOTE - OBJECTIVE STATEMENT
42 y/o male with no significant pmhx c/o dyspnea associated with chest pressure and subjective fever with T=103 x Fri.  pt denies any n/v, weakness, or dizziness at this time.  pt is awake, alert and responsive to all stimuli.  no respiratory distress noted.  skin is warm, dry and intact.  no pallor or cyanosis noted.  vss.  safety precautions in place.  will continue to monitor.

## 2021-08-27 PROBLEM — Z78.9 OTHER SPECIFIED HEALTH STATUS: Chronic | Status: ACTIVE | Noted: 2021-08-26

## 2021-08-27 LAB
A1C WITH ESTIMATED AVERAGE GLUCOSE RESULT: 5.4 % — SIGNIFICANT CHANGE UP (ref 4–5.6)
ALBUMIN SERPL ELPH-MCNC: 3.8 G/DL — SIGNIFICANT CHANGE UP (ref 3.3–5)
ALBUMIN SERPL ELPH-MCNC: 3.9 G/DL
ALP BLD-CCNC: 57 U/L
ALP SERPL-CCNC: 54 U/L — SIGNIFICANT CHANGE UP (ref 40–120)
ALT FLD-CCNC: 8 U/L — LOW (ref 10–45)
ALT SERPL-CCNC: 12 U/L
ANION GAP SERPL CALC-SCNC: 15 MMOL/L — SIGNIFICANT CHANGE UP (ref 5–17)
ANION GAP SERPL CALC-SCNC: 20 MMOL/L
AST SERPL-CCNC: 15 U/L
AST SERPL-CCNC: 9 U/L — LOW (ref 10–40)
B BURGDOR IGG+IGM SER QL IB: NORMAL
B MICROTI DNA BLD QL NAA+PROBE: NORMAL
BASOPHILS # BLD AUTO: 0.02 K/UL
BASOPHILS # BLD AUTO: 0.03 K/UL — SIGNIFICANT CHANGE UP (ref 0–0.2)
BASOPHILS NFR BLD AUTO: 0.3 %
BASOPHILS NFR BLD AUTO: 0.4 % — SIGNIFICANT CHANGE UP (ref 0–2)
BILIRUB SERPL-MCNC: 0.6 MG/DL
BILIRUB SERPL-MCNC: 0.6 MG/DL — SIGNIFICANT CHANGE UP (ref 0.2–1.2)
BUN SERPL-MCNC: 15 MG/DL — SIGNIFICANT CHANGE UP (ref 7–23)
BUN SERPL-MCNC: 19 MG/DL
CALCIUM SERPL-MCNC: 9.2 MG/DL
CALCIUM SERPL-MCNC: 9.4 MG/DL — SIGNIFICANT CHANGE UP (ref 8.4–10.5)
CHLORIDE SERPL-SCNC: 100 MMOL/L — SIGNIFICANT CHANGE UP (ref 96–108)
CHLORIDE SERPL-SCNC: 99 MMOL/L
CHOLEST SERPL-MCNC: 137 MG/DL — SIGNIFICANT CHANGE UP
CO2 SERPL-SCNC: 22 MMOL/L
CO2 SERPL-SCNC: 24 MMOL/L — SIGNIFICANT CHANGE UP (ref 22–31)
COVID-19 SPIKE DOMAIN AB INTERP: POSITIVE
COVID-19 SPIKE DOMAIN ANTIBODY RESULT: >250 U/ML — HIGH
CREAT SERPL-MCNC: 0.97 MG/DL — SIGNIFICANT CHANGE UP (ref 0.5–1.3)
CREAT SERPL-MCNC: 1.08 MG/DL
CRP SERPL-MCNC: 66 MG/L
EOSINOPHIL # BLD AUTO: 0.11 K/UL
EOSINOPHIL # BLD AUTO: 0.15 K/UL — SIGNIFICANT CHANGE UP (ref 0–0.5)
EOSINOPHIL NFR BLD AUTO: 1.5 %
EOSINOPHIL NFR BLD AUTO: 2.1 % — SIGNIFICANT CHANGE UP (ref 0–6)
ERYTHROCYTE [SEDIMENTATION RATE] IN BLOOD BY WESTERGREN METHOD: 44 MM/HR
ESTIMATED AVERAGE GLUCOSE: 108 MG/DL — SIGNIFICANT CHANGE UP (ref 68–114)
GLUCOSE SERPL-MCNC: 103 MG/DL — HIGH (ref 70–99)
GLUCOSE SERPL-MCNC: 27 MG/DL
HCT VFR BLD CALC: 41.1 % — SIGNIFICANT CHANGE UP (ref 39–50)
HCT VFR BLD CALC: 44.4 %
HDLC SERPL-MCNC: 42 MG/DL — SIGNIFICANT CHANGE UP
HGB BLD-MCNC: 13.8 G/DL — SIGNIFICANT CHANGE UP (ref 13–17)
HGB BLD-MCNC: 14.5 G/DL
IMM GRANULOCYTES NFR BLD AUTO: 0.3 %
IMM GRANULOCYTES NFR BLD AUTO: 0.3 % — SIGNIFICANT CHANGE UP (ref 0–1.5)
LIPID PNL WITH DIRECT LDL SERPL: 81 MG/DL — SIGNIFICANT CHANGE UP
LYMPHOCYTES # BLD AUTO: 1.02 K/UL
LYMPHOCYTES # BLD AUTO: 1.56 K/UL — SIGNIFICANT CHANGE UP (ref 1–3.3)
LYMPHOCYTES # BLD AUTO: 21.4 % — SIGNIFICANT CHANGE UP (ref 13–44)
LYMPHOCYTES NFR BLD AUTO: 14.3 %
MAGNESIUM SERPL-MCNC: 2.2 MG/DL — SIGNIFICANT CHANGE UP (ref 1.6–2.6)
MAN DIFF?: NORMAL
MCHC RBC-ENTMCNC: 29.3 PG — SIGNIFICANT CHANGE UP (ref 27–34)
MCHC RBC-ENTMCNC: 29.5 PG
MCHC RBC-ENTMCNC: 32.7 GM/DL
MCHC RBC-ENTMCNC: 33.6 GM/DL — SIGNIFICANT CHANGE UP (ref 32–36)
MCV RBC AUTO: 87.3 FL — SIGNIFICANT CHANGE UP (ref 80–100)
MCV RBC AUTO: 90.2 FL
MONOCYTES # BLD AUTO: 0.54 K/UL
MONOCYTES # BLD AUTO: 0.75 K/UL — SIGNIFICANT CHANGE UP (ref 0–0.9)
MONOCYTES NFR BLD AUTO: 10.3 % — SIGNIFICANT CHANGE UP (ref 2–14)
MONOCYTES NFR BLD AUTO: 7.6 %
MRSA PCR RESULT.: SIGNIFICANT CHANGE UP
NEUTROPHILS # BLD AUTO: 4.78 K/UL — SIGNIFICANT CHANGE UP (ref 1.8–7.4)
NEUTROPHILS # BLD AUTO: 5.41 K/UL
NEUTROPHILS NFR BLD AUTO: 65.5 % — SIGNIFICANT CHANGE UP (ref 43–77)
NEUTROPHILS NFR BLD AUTO: 76 %
NON HDL CHOLESTEROL: 95 MG/DL — SIGNIFICANT CHANGE UP
NRBC # BLD: 0 /100 WBCS — SIGNIFICANT CHANGE UP (ref 0–0)
NT-PROBNP SERPL-MCNC: 1241 PG/ML
PHOSPHATE SERPL-MCNC: 4.2 MG/DL — SIGNIFICANT CHANGE UP (ref 2.5–4.5)
PLATELET # BLD AUTO: 258 K/UL — SIGNIFICANT CHANGE UP (ref 150–400)
PLATELET # BLD AUTO: 264 K/UL
POTASSIUM SERPL-MCNC: 3.7 MMOL/L — SIGNIFICANT CHANGE UP (ref 3.5–5.3)
POTASSIUM SERPL-SCNC: 3.7 MMOL/L
POTASSIUM SERPL-SCNC: 3.7 MMOL/L — SIGNIFICANT CHANGE UP (ref 3.5–5.3)
PROT SERPL-MCNC: 6.7 G/DL — SIGNIFICANT CHANGE UP (ref 6–8.3)
PROT SERPL-MCNC: 6.9 G/DL
RBC # BLD: 4.71 M/UL — SIGNIFICANT CHANGE UP (ref 4.2–5.8)
RBC # BLD: 4.92 M/UL
RBC # FLD: 11.7 % — SIGNIFICANT CHANGE UP (ref 10.3–14.5)
RBC # FLD: 12.1 %
S AUREUS DNA NOSE QL NAA+PROBE: SIGNIFICANT CHANGE UP
SARS-COV-2 IGG+IGM SERPL QL IA: >250 U/ML — HIGH
SARS-COV-2 IGG+IGM SERPL QL IA: POSITIVE
SODIUM SERPL-SCNC: 139 MMOL/L — SIGNIFICANT CHANGE UP (ref 135–145)
SODIUM SERPL-SCNC: 141 MMOL/L
TRIGL SERPL-MCNC: 71 MG/DL — SIGNIFICANT CHANGE UP
TROPONIN I SERPL-MCNC: <0.01 NG/ML
TSH SERPL-ACNC: 3.98 UIU/ML
TSH SERPL-MCNC: 2.26 UIU/ML — SIGNIFICANT CHANGE UP (ref 0.27–4.2)
VANCOMYCIN TROUGH SERPL-MCNC: 4 UG/ML — LOW (ref 10–20)
WBC # BLD: 7.29 K/UL — SIGNIFICANT CHANGE UP (ref 3.8–10.5)
WBC # FLD AUTO: 7.12 K/UL
WBC # FLD AUTO: 7.29 K/UL — SIGNIFICANT CHANGE UP (ref 3.8–10.5)

## 2021-08-27 PROCEDURE — 99291 CRITICAL CARE FIRST HOUR: CPT

## 2021-08-27 PROCEDURE — 99232 SBSQ HOSP IP/OBS MODERATE 35: CPT

## 2021-08-27 PROCEDURE — 99252 IP/OBS CONSLTJ NEW/EST SF 35: CPT

## 2021-08-27 PROCEDURE — 93880 EXTRACRANIAL BILAT STUDY: CPT | Mod: 26

## 2021-08-27 PROCEDURE — 71045 X-RAY EXAM CHEST 1 VIEW: CPT | Mod: 26

## 2021-08-27 RX ORDER — SPIRONOLACTONE 25 MG/1
50 TABLET, FILM COATED ORAL
Refills: 0 | Status: DISCONTINUED | OUTPATIENT
Start: 2021-08-27 | End: 2021-08-31

## 2021-08-27 RX ORDER — BUMETANIDE 0.25 MG/ML
1 INJECTION INTRAMUSCULAR; INTRAVENOUS EVERY 12 HOURS
Refills: 0 | Status: DISCONTINUED | OUTPATIENT
Start: 2021-08-27 | End: 2021-08-28

## 2021-08-27 RX ORDER — POTASSIUM CHLORIDE 20 MEQ
40 PACKET (EA) ORAL ONCE
Refills: 0 | Status: COMPLETED | OUTPATIENT
Start: 2021-08-27 | End: 2021-08-27

## 2021-08-27 RX ORDER — BUMETANIDE 0.25 MG/ML
0.5 INJECTION INTRAMUSCULAR; INTRAVENOUS
Qty: 20 | Refills: 0 | Status: DISCONTINUED | OUTPATIENT
Start: 2021-08-27 | End: 2021-08-27

## 2021-08-27 RX ORDER — BUMETANIDE 0.25 MG/ML
2 INJECTION INTRAMUSCULAR; INTRAVENOUS DAILY
Refills: 0 | Status: DISCONTINUED | OUTPATIENT
Start: 2021-08-27 | End: 2021-08-27

## 2021-08-27 RX ORDER — BUMETANIDE 0.25 MG/ML
2 INJECTION INTRAMUSCULAR; INTRAVENOUS ONCE
Refills: 0 | Status: COMPLETED | OUTPATIENT
Start: 2021-08-27 | End: 2021-08-27

## 2021-08-27 RX ORDER — FUROSEMIDE 40 MG
10 TABLET ORAL ONCE
Refills: 0 | Status: DISCONTINUED | OUTPATIENT
Start: 2021-08-27 | End: 2021-08-27

## 2021-08-27 RX ADMIN — SPIRONOLACTONE 50 MILLIGRAM(S): 25 TABLET, FILM COATED ORAL at 06:33

## 2021-08-27 RX ADMIN — BUMETANIDE 2 MILLIGRAM(S): 0.25 INJECTION INTRAMUSCULAR; INTRAVENOUS at 04:27

## 2021-08-27 RX ADMIN — Medication 166.67 MILLIGRAM(S): at 03:40

## 2021-08-27 RX ADMIN — SODIUM CHLORIDE 3 MILLILITER(S): 9 INJECTION INTRAMUSCULAR; INTRAVENOUS; SUBCUTANEOUS at 14:28

## 2021-08-27 RX ADMIN — BUMETANIDE 1 MILLIGRAM(S): 0.25 INJECTION INTRAMUSCULAR; INTRAVENOUS at 19:23

## 2021-08-27 RX ADMIN — ENOXAPARIN SODIUM 40 MILLIGRAM(S): 100 INJECTION SUBCUTANEOUS at 12:29

## 2021-08-27 RX ADMIN — SPIRONOLACTONE 50 MILLIGRAM(S): 25 TABLET, FILM COATED ORAL at 21:11

## 2021-08-27 RX ADMIN — SODIUM CHLORIDE 3 MILLILITER(S): 9 INJECTION INTRAMUSCULAR; INTRAVENOUS; SUBCUTANEOUS at 21:16

## 2021-08-27 RX ADMIN — BUMETANIDE 2.5 MG/HR: 0.25 INJECTION INTRAMUSCULAR; INTRAVENOUS at 08:08

## 2021-08-27 RX ADMIN — PANTOPRAZOLE SODIUM 40 MILLIGRAM(S): 20 TABLET, DELAYED RELEASE ORAL at 12:29

## 2021-08-27 RX ADMIN — Medication 40 MILLIEQUIVALENT(S): at 08:08

## 2021-08-27 RX ADMIN — SPIRONOLACTONE 50 MILLIGRAM(S): 25 TABLET, FILM COATED ORAL at 14:22

## 2021-08-27 RX ADMIN — Medication 166.67 MILLIGRAM(S): at 15:41

## 2021-08-27 RX ADMIN — BUMETANIDE 2 MILLIGRAM(S): 0.25 INJECTION INTRAMUSCULAR; INTRAVENOUS at 06:38

## 2021-08-27 RX ADMIN — CEFTRIAXONE 100 MILLIGRAM(S): 500 INJECTION, POWDER, FOR SOLUTION INTRAMUSCULAR; INTRAVENOUS at 14:22

## 2021-08-27 RX ADMIN — SODIUM CHLORIDE 3 MILLILITER(S): 9 INJECTION INTRAMUSCULAR; INTRAVENOUS; SUBCUTANEOUS at 06:35

## 2021-08-27 RX ADMIN — POLYETHYLENE GLYCOL 3350 17 GRAM(S): 17 POWDER, FOR SOLUTION ORAL at 12:28

## 2021-08-27 NOTE — CONSULT NOTE ADULT - ASSESSMENT
41 year old with presumed mitral valve prolapse presents with fever and newly diagnosed severe MR with signs of CHF on exam.  Concern for endocarditis. BLood cultures pending. IV antibiotics for now. ID following.  CHF: improved with IV diuretics. Continue current regimen. Monitor K.  MVP/flail leaflet with severe regurgitation: will require repair/replacement. Discussed the pros and cons of mechanical vs. bioprosthesis. He is leaning toward a bio-prosthetic valve.  If no sign of active endocarditis I agree with MVR next week.  Suggested that patient be allowed to ambulate while awaiting culture results and diuresis.  DVT prophy.

## 2021-08-27 NOTE — CONSULT NOTE ADULT - SUBJECTIVE AND OBJECTIVE BOX
HPI:  41M previously healthy, began to feel chest heaviness and orthopnea on Sat 8/21/21.   On Tues 8/24 developed high fevers.   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   No dental issues or recent dental procedures. Still has his wisdom teeth.   No sick contacts - lives with his wife and two small kids, no one is sick. Works in an office job.   No IVDU. No illicit drugs at all.   No animal exposure. Went to a farm in [place] in July but no contact with animals.       PAST MEDICAL & SURGICAL HISTORY:  No pertinent past medical history  No significant past surgical history      Allergies    No Known Allergies    Intolerances        ANTIMICROBIALS:  cefTRIAXone   IVPB    vancomycin  IVPB. 1000 once      OTHER MEDS:      SOCIAL HISTORY: Quit smoking, uses Juul. , two small kids at home.     FAMILY HISTORY: Father had a heart attack     ROS:  All other systems negative   Constitutional: fever, chills  Eye: no vision changes  ENT: no sore throat, no rhinorrhea  Cardiovascular: per HPI   Respiratory: per HPI   GI:  no abd pain, no vomiting, no diarrhea  urinary: no dysuria, no hematuria, no flank pain  musculoskeletal: no joint pain, no joint swelling  skin: no rash  neurology: no headache, no change in mental status  psych: no anxiety    Physical Exam:  General: awake, alert, non toxic  Head: atraumatic, normocephalic  Eyes: normal sclera and conjunctiva  ENT: good dentition, gums look fine, no oropharyngeal lesions, no LAD, neck supple  Cardio: regular rate and rhythm. systolic murmur   Respiratory: nonlabored on room air, clear bilaterally, no wheezing  abd: soft, bowel sounds present, not tender  : no suprapubic tenderness, no collazo  Musculoskeletal: no joint swelling, no edema  Skin: no rash  vascular: no phlebitis  Neurologic: no focal deficits  psych: normal affect       Drug Dosing Weight  Height (cm): 182.9 (26 Aug 2021 12:09)  Weight (kg): 77.1 (26 Aug 2021 12:09)  BMI (kg/m2): 23 (26 Aug 2021 12:09)  BSA (m2): 1.99 (26 Aug 2021 12:09)    Vital Signs Last 24 Hrs  T(F): 99.3 (08-26-21 @ 12:09), Max: 99.3 (08-26-21 @ 12:09)    Vital Signs Last 24 Hrs  HR: 101 (08-26-21 @ 12:09) (101 - 101)  BP: 126/85 (08-26-21 @ 12:09) (126/85 - 126/85)  RR: 18 (08-26-21 @ 12:09)  SpO2: 97% (08-26-21 @ 12:09) (97% - 97%)  Wt(kg): --                          13.8   7.25  )-----------( 244      ( 26 Aug 2021 14:14 )             41.2       08-26    139  |  104  |  17  ----------------------------<  101<H>  4.0   |  22  |  0.91    Ca    9.1      26 Aug 2021 14:14    TPro  6.3  /  Alb  3.7  /  TBili  0.6  /  DBili  x   /  AST  10  /  ALT  8<L>  /  AlkPhos  53  08-26          MICROBIOLOGY:        RADIOLOGY:  Images below reviewed personally  CT Angio Chest PE Protocol w/ IV Cont (08.26.21 @ 13:51)   1.  No pulmonary embolism.  2.  Moderate bilateral pleural effusions with patchy predominantly centrilobular groundglass opacities and intralobular septal thickening, consistent with pulmonary edema.  3.  Left heart dilatation  
Patient seen and evaluated @ 1 pm  Chief Complaint: severe MR/Fever/CHF    HPI:  This is  a well groomed well developed 40y/o  male in general good health. Developed fever t max 104 x 48hrs presented to Urgent care on cardiac exam found to have + Murmur.  Seen by Dr. 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  Denies travel, Covid, sick contacts CP/PALPs and  dental work. Denies N/V/CP. Endorses orthopnea.   Admitted to Dr Rodrigues for  further management.  (26 Aug 2021 12:56)    His MR was re-confirmed on echo. He feels that his breathing is better after diuresis.  Blood cultures are negative to date ~24 hours.  Wife at bedside.    PMH:   MVP - undiagnosed      PSH:   No significant past surgical history      Medications:   buMETAnide Infusion 0.5 mG/Hr IV Continuous <Continuous>  cefTRIAXone   IVPB 2000 milliGRAM(s) IV Intermittent every 24 hours  chlorhexidine 0.12% Liquid 15 milliLiter(s) Swish and Spit once  enoxaparin Injectable 40 milliGRAM(s) SubCutaneous daily  pantoprazole  Injectable 40 milliGRAM(s) IV Push daily  polyethylene glycol 3350 17 Gram(s) Oral daily  sodium chloride 0.9% lock flush 3 milliLiter(s) IV Push every 8 hours  spironolactone 50 milliGRAM(s) Oral <User Schedule>  vancomycin  IVPB 1250 milliGRAM(s) IV Intermittent every 12 hours    Allergies:  No Known Allergies    FAMILY HISTORY:    Social History: lives at home children 5 and 6.  Smoking: none recently.  Alcohol: socially    Review of Systems:  No further orthopnea. Has home in [place]. No tick bites recently.  The remainder of the ROS is negative.    Physical Exam:  T(C): 36.2 (21 @ 08:00), Max: 36.8 (21 @ 20:00)  HR: 96 (21 @ 11:00) (80 - 106)  BP: 104/65 (21 @ 11:00) (101/61 - 134/89)  RR: 36 (21 @ 11:00) (19 - 40)  SpO2: 96% (21 @ 11:00) (90% - 96%)  Wt(kg): --     @ 07:  -   @ 07:00  --------------------------------------------------------  IN: 670 mL / OUT: 2300 mL / NET: -1630 mL     @ 07:  -   @ 13:17  --------------------------------------------------------  IN: 370 mL / OUT: 1250 mL / NET: -880 mL      Daily     Daily Weight in k.2 (27 Aug 2021 00:00)    Appearance: Lying flat in NAD  Eyes: PERRL, EOMI, no scleral icterus   HENT: moist oral mucosa  Cardiovascular: Regular rhythm, Normal S1, 4/6 HSM at left apex. No peripheral edema; no JVD  Respiratory: Clear to auscultation except for right lung base dullness.  Gastrointestinal: Soft, +BS  Musculoskeletal: No clubbing   Neurologic: No focal weakness  Lymphatic: No lymphadenopathy  Psychiatry: A&Ox3 with appropriate mood and affect  Skin: No rashes, ecchymoses, or cyanosis    Cardiovascular Diagnostic Testing:  ECG:    Echo:< from: Transthoracic Echocardiogram (21 @ 15:11) >  Conclusions:  1. Flail posterior mitral leaflet - appears to be a torn  chordae attached.  *Torrential, severe, anteriorly-directed mitral  regurgitation.*  2. Severely dilated left atrium.  LA volume index = 84  cc/m2.  3. Normal left ventricular internal dimensions and wall  thicknesses.  4. Normal left ventricular systolic function. No segmental  wall motion abnormalities.  5. Normal right ventricular size and function.  6. Bilateral pleural effusions.  *** No previous Echo exam.  ------------------------------------------------------------------------  Confirmed on  2021 - 17:06:00 by Be Andre M.D.    < end of copied text >      Stress Testing:       Labs:                        13.8   7.29  )-----------( 258      ( 27 Aug 2021 04:02 )             41.1         139  |  100  |  15  ----------------------------<  103<H>  3.7   |  24  |  0.97    Ca    9.4      27 Aug 2021 02:39  Phos  4.2       Mg     2.2         TPro  6.7  /  Alb  3.8  /  TBili  0.6  /  DBili  x   /  AST  9<L>  /  ALT  8<L>  /  AlkPhos  54        CARDIAC MARKERS ( 26 Aug 2021 14:14 )  x     / x     / 61 U/L / x     / x          Serum Pro-Brain Natriuretic Peptide: 1454 pg/mL ( @ 18:24)    Total Cholesterol: 137  LDL: --  HDL: 42  T      Thyroid Stimulating Hormone, Serum: 2.26 uIU/mL ( @ 00:24)

## 2021-08-27 NOTE — PROGRESS NOTE ADULT - ASSESSMENT
On empiric antibiotics for possible IE although no clear vegetations blood cultures negative so far     Would fever occur with noninfectious causes? Spontaneous rupture?   Sangita CC, Leyda R, Benji M, Kelly S, Miley N, Doron ZARATE. Case : A 54-Year-Old Man with Fever, Cough, and Hypoxemia. N Engl J Med. 2019 Jul 25;381(4):359-369. doi: [id number]. PMID: [id number].    Ischemic cause seems unlikely     Myxomatous?     Discussed with CT surgery     Star Dwyer MD   Infectious Disease   Pager [phone]   After 5PM and on weekends please page fellow on call or call [phone] Flail MV with fevers and clinical CHF.     IE is possible although unclear mechanism, no clear risk factor or vegetations on TTE and blood cultures are negative to date.     Would fever occur with noninfectious causes? Spontaneous rupture? May have had previous MVR. Not ischemic. Myxomatous?   Sangita CC, Leyda R, Benji M, Kelly S, Miley N, Doron ZARATE. Case : A 54-Year-Old Man with Fever, Cough, and Hypoxemia. N Engl J Med. 2019 Jul 25;381(4):359-369. doi: 10.[id number]. PMID: [id number].    Suggest  -monitor blood cultures   -empiric Ceftriaxone 2GM IV q24h and Vanc 1.25GM IV q12h for now  -monitor creatinine and Vanc levels - 4.0 this morning was too soon   -echocardiogram and intervention per surgery     Discussed with CT surgery     Star Dwyer MD   Infectious Disease   Pager [phone]   After 5PM and on weekends please page fellow on call or call [phone]

## 2021-08-27 NOTE — PROGRESS NOTE ADULT - SUBJECTIVE AND OBJECTIVE BOX
Follow Up: Flail MV    Interval History/ROS: Afebrile. Feels better since being here. Still a bit short of breath.     Allergies  No Known Allergies      ANTIMICROBIALS:  cefTRIAXone   IVPB 2000 every 24 hours  vancomycin  IVPB 1250 every 12 hours      OTHER MEDS:  buMETAnide Infusion 0.5 mG/Hr IV Continuous <Continuous>  chlorhexidine 0.12% Liquid 15 milliLiter(s) Swish and Spit once  enoxaparin Injectable 40 milliGRAM(s) SubCutaneous daily  pantoprazole  Injectable 40 milliGRAM(s) IV Push daily  polyethylene glycol 3350 17 Gram(s) Oral daily  sodium chloride 0.9% lock flush 3 milliLiter(s) IV Push every 8 hours  spironolactone 50 milliGRAM(s) Oral <User Schedule>      Vital Signs Last 24 Hrs  T(C): 36.2 (27 Aug 2021 08:00), Max: 36.8 (26 Aug 2021 20:00)  T(F): 97.1 (27 Aug 2021 08:00), Max: 98.2 (26 Aug 2021 20:00)  HR: 94 (27 Aug 2021 18:00) (80 - 115)  BP: 116/74 (27 Aug 2021 18:00) (101/61 - 120/76)  BP(mean): 88 (27 Aug 2021 18:00) (75 - 93)  RR: 28 (27 Aug 2021 18:00) (20 - 45)  SpO2: 95% (27 Aug 2021 18:00) (90% - 97%)    Physical Exam:  General: awake, alert, non toxic  Head: atraumatic, normocephalic  Eye: normal sclera and conjunctiva  ENT: no oropharyngeal lesions, no cervical lymphadenopathy   Cardio: regular rate. systolic murmur   Respiratory: nonlabored on nasal cannula   abd: soft, no tenderness  Musculoskeletal: no focal joint swelling  Skin: no rash  Neurologic: no focal deficit  psych: normal affect                          13.8   7.29  )-----------( 258      ( 27 Aug 2021 04:02 )             41.1       08-27    139  |  100  |  15  ----------------------------<  103<H>  3.7   |  24  |  0.97    Ca    9.4      27 Aug 2021 02:39  Phos  4.2     08-27  Mg     2.2     08-27    TPro  6.7  /  Alb  3.8  /  TBili  0.6  /  DBili  x   /  AST  9<L>  /  ALT  8<L>  /  AlkPhos  54  08-27      Urinalysis Basic - ( 26 Aug 2021 19:07 )    Color: Yellow / Appearance: Clear / SG: >1.050 / pH: x  Gluc: x / Ketone: Small  / Bili: Negative / Urobili: Negative   Blood: x / Protein: 100 / Nitrite: Negative   Leuk Esterase: Negative / RBC: x / WBC x   Sq Epi: x / Non Sq Epi: x / Bacteria: x        MICROBIOLOGY:  Vancomycin Level, Trough: 4.0 ug/mL (08-27-21 @ 02:39)    Culture - Blood (collected 08-26-21 @ 17:13)  Source: .Blood Blood-Peripheral  Preliminary Report (08-27-21 @ 18:01):    No growth to date.    Culture - Blood (collected 08-26-21 @ 17:13)  Source: .Blood Blood-Peripheral  Preliminary Report (08-27-21 @ 18:01):    No growth to date.    Rapid RVP Result: NotDetec (08-26 @ 18:24)    RADIOLOGY:  Transthoracic Echocardiogram (08.26.21 @ 15:11)   1. Flail posterior mitral leaflet - appears to be a torn  chordae attached.  *Torrential, severe, anteriorly-directed mitral  regurgitation.*  2. Severely dilated left atrium.  LA volume index = 84  cc/m2.  3. Normal left ventricular internal dimensions and wall  thicknesses.  4. Normal left ventricular systolic function. No segmental  wall motion abnormalities.  5. Normal right ventricular size and function.  6. Bilateral pleural effusions.    Images below reviewed personally  Xray Chest 1 View- PORTABLE-Routine (Xray Chest 1 View- PORTABLE-Routine in AM.) (08.27.21 @ 02:12)   Pulmonary congestion and bilateral pleural effusions.

## 2021-08-27 NOTE — PROGRESS NOTE ADULT - SUBJECTIVE AND OBJECTIVE BOX
MAX BOWER  MRN-[id number]  Patient is a 41y old  Male who presents with a chief complaint of Fever (26 Aug 2021 19:54)      HPI:  This is  a well groomed well developed 40y/o  male in general good health. Developed fever t max 104 x 48hrs presented to Urgent care on cardiac exam found to have + Murmur.  Seen by Dr. 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  Denies travel, Covid, sick contacts CP/PALPs and  dental work. Denies N/V/CP. Endorses orthopnea.   Admitted to Dr Rodrigues for  further management.  (26 Aug 2021 12:56)      Surgery/Hospital Course:  8/26 ?endocarditis, preop MVR; TTE EF 59%. flail posterior MV leaflet, torn choriade, torrential MR     Today:  No acute events     ICU Vital Signs Last 24 Hrs  T(C): 36.6 (27 Aug 2021 04:00), Max: 37.4 (26 Aug 2021 12:09)  T(F): 97.9 (27 Aug 2021 04:00), Max: 99.3 (26 Aug 2021 12:09)  HR: 84 (27 Aug 2021 05:00) (80 - 105)  BP: 105/73 (27 Aug 2021 05:00) (101/61 - 134/89)  BP(mean): 84 (27 Aug 2021 05:00) (75 - 92)  ABP: --  ABP(mean): --  RR: 26 (27 Aug 2021 05:00) (18 - 40)  SpO2: 94% (27 Aug 2021 05:00) (90% - 97%)      Physical Exam:  Gen:  Awake, alert   CNS: non focal 	  Neck: no JVD  RES : rales noted b/l, no wheezing              CVS: Regular  rhythm. Normal S1/S2 +holosystolic murmur   Abd: Soft, non-distended. Bowel sounds present.  Skin: No rash.  Ext:  no edema    ============================I/O===========================   I&O's Detail    26 Aug 2021 07:01  -  27 Aug 2021 06:01  --------------------------------------------------------  IN:    IV PiggyBack: 550 mL    Oral Fluid: 120 mL  Total IN: 670 mL    OUT:    Voided (mL): 1750 mL  Total OUT: 1750 mL    Total NET: -1080 mL        ============================ LABS =========================                        13.8   7.29  )-----------( 258      ( 27 Aug 2021 04:02 )             41.1     08-27    139  |  100  |  15  ----------------------------<  103<H>  3.7   |  24  |  0.97    Ca    9.4      27 Aug 2021 02:39  Phos  4.2     08-27  Mg     2.2     08-27    TPro  6.7  /  Alb  3.8  /  TBili  0.6  /  DBili  x   /  AST  9<L>  /  ALT  8<L>  /  AlkPhos  54  08-27    LIVER FUNCTIONS - ( 27 Aug 2021 02:39 )  Alb: 3.8 g/dL / Pro: 6.7 g/dL / ALK PHOS: 54 U/L / ALT: 8 U/L / AST: 9 U/L / GGT: x             ABG - ( 26 Aug 2021 22:13 )  pH, Arterial: 7.42  pH, Blood: x     /  pCO2: 40    /  pO2: 65    / HCO3: 26    / Base Excess: 1.3   /  SaO2: 95.5              Urinalysis Basic - ( 26 Aug 2021 19:07 )    Color: Yellow / Appearance: Clear / SG: >1.050 / pH: x  Gluc: x / Ketone: Small  / Bili: Negative / Urobili: Negative   Blood: x / Protein: 100 / Nitrite: Negative   Leuk Esterase: Negative / RBC: x / WBC x   Sq Epi: x / Non Sq Epi: x / Bacteria: x      ======================Micro/Rad/Cardio=================  CXR: Reviewed  Echo:Reviewed  ======================================================  PAST MEDICAL & SURGICAL HISTORY:  No pertinent past medical history    No significant past surgical history      ====================ASSESSMENT ==============  Endocarditis, preop MVR   Pulmonary edema     Plan:  ====================== NEUROLOGY=====================  Continue close monitoring of neuro status     ==================== RESPIRATORY======================  Pulmonary edema   Supplemental O2 via 2L NC   Encourage incentive spirometry, continue pulse ox monitoring, follow ABGs     ====================CARDIOVASCULAR==================  TTE on 8/26 - EF 59%. flail posterior MV leaflet, torn choriade, torrential MR   Admitted for ?endocarditis, preop MVR   Continue hemodynamic monitoring     ===================HEMATOLOGIC/ONC ===================  Monitor H&H/Plts      VTE prophylaxis, enoxaparin Injectable 40 milliGRAM(s) SubCutaneous daily    ===================== RENAL =========================  Continue monitoring urine output, I&OS, BUN/Cr   Replete lytes PRN. Keep K> 4 and Mg >2   Diuresis with Bumex and Aldactone     buMETAnide 2 milliGRAM(s) Oral daily  buMETAnide Infusion 0.5 mG/Hr (2.5 mL/Hr) IV Continuous <Continuous>  spironolactone 50 milliGRAM(s) Oral <User Schedule>    ==================== GASTROINTESTINAL===================  Tolerating regular PO diet   Bowel regimen with Miralax     GI prophylaxis, pantoprazole  Injectable 40 milliGRAM(s) IV Push daily  polyethylene glycol 3350 17 Gram(s) Oral daily  sodium chloride 0.9% lock flush 3 milliLiter(s) IV Push every 8 hours    =======================    ENDOCRINE  =====================  Continue monitoring blood glucose closely for need to initiate sliding scale     ========================INFECTIOUS DISEASE================  Afebrile, WBC within normal limits  Continue trending WBC and monitoring fever curve   Empiric coverage with ceftriaxone and vancomycin per ID   BCx sent yesterday, will f/u     cefTRIAXone   IVPB 2000 milliGRAM(s) IV Intermittent every 24 hours  vancomycin  IVPB 1250 milliGRAM(s) IV Intermittent every 12 hours          I have spent 35 minutes providing acute care for this critically ill patient     Patient requires continuous monitoring with bedside rhythm monitoring, pulse ox monitoring, and intermittent blood gas analysis. Care plan discussed with ICU care team. Patient remained critical and at risk for life threatening decompensation.     By signing my name below, I, Jessica No, attest that this documentation has been prepared under the direction and in the presence of Maksim Javier MD   Electronically signed: Jarrod Carbajal, 08-27-21 @ 06:02    I, Maksim Javier, personally performed the services described in this documentation. all medical record entries made by the belaibalexander were at my direction and in my presence. I have reviewed the chart and agree that the record reflects my personal performance and is accurate and complete  Electronically signed: Maksim Javier MD        MAX BOWER  MRN-[id number]  Patient is a 41y old  Male who presents with a chief complaint of Fever (26 Aug 2021 19:54)      HPI:  This is  a well groomed well developed 42y/o  male in general good health. Developed fever t max 104 x 48hrs presented to Urgent care on cardiac exam found to have + Murmur.  Seen by Dr. 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  Denies travel, Covid, sick contacts CP/PALPs and  dental work. Denies N/V/CP. Endorses orthopnea.   Admitted to Dr Rodrigues for  further management.  (26 Aug 2021 12:56)      Surgery/Hospital Course:  8/26 ?endocarditis, preop MVR; TTE EF 59%. flail posterior MV leaflet, torn choriade, torrential MR     Today:  Pending BCx sent yesterday, will f/u. Continue diuresis with IV Bumex and Aldactone.     ICU Vital Signs Last 24 Hrs  T(C): 36.6 (27 Aug 2021 04:00), Max: 37.4 (26 Aug 2021 12:09)  T(F): 97.9 (27 Aug 2021 04:00), Max: 99.3 (26 Aug 2021 12:09)  HR: 84 (27 Aug 2021 05:00) (80 - 105)  BP: 105/73 (27 Aug 2021 05:00) (101/61 - 134/89)  BP(mean): 84 (27 Aug 2021 05:00) (75 - 92)  ABP: --  ABP(mean): --  RR: 26 (27 Aug 2021 05:00) (18 - 40)  SpO2: 94% (27 Aug 2021 05:00) (90% - 97%)      Physical Exam:  Gen:  Awake, alert   CNS: non focal 	  Neck: no JVD  RES : rales noted b/l, no wheezing              CVS: Regular  rhythm. Normal S1/S2 +holosystolic murmur   Abd: Soft, non-distended. Bowel sounds present.  Skin: No rash.  Ext:  no edema    ============================I/O===========================   I&O's Detail    26 Aug 2021 07:01  -  27 Aug 2021 06:01  --------------------------------------------------------  IN:    IV PiggyBack: 550 mL    Oral Fluid: 120 mL  Total IN: 670 mL    OUT:    Voided (mL): 1750 mL  Total OUT: 1750 mL    Total NET: -1080 mL        ============================ LABS =========================                        13.8   7.29  )-----------( 258      ( 27 Aug 2021 04:02 )             41.1     08-27    139  |  100  |  15  ----------------------------<  103<H>  3.7   |  24  |  0.97    Ca    9.4      27 Aug 2021 02:39  Phos  4.2     08-27  Mg     2.2     08-27    TPro  6.7  /  Alb  3.8  /  TBili  0.6  /  DBili  x   /  AST  9<L>  /  ALT  8<L>  /  AlkPhos  54  08-27    LIVER FUNCTIONS - ( 27 Aug 2021 02:39 )  Alb: 3.8 g/dL / Pro: 6.7 g/dL / ALK PHOS: 54 U/L / ALT: 8 U/L / AST: 9 U/L / GGT: x             ABG - ( 26 Aug 2021 22:13 )  pH, Arterial: 7.42  pH, Blood: x     /  pCO2: 40    /  pO2: 65    / HCO3: 26    / Base Excess: 1.3   /  SaO2: 95.5              Urinalysis Basic - ( 26 Aug 2021 19:07 )    Color: Yellow / Appearance: Clear / SG: >1.050 / pH: x  Gluc: x / Ketone: Small  / Bili: Negative / Urobili: Negative   Blood: x / Protein: 100 / Nitrite: Negative   Leuk Esterase: Negative / RBC: x / WBC x   Sq Epi: x / Non Sq Epi: x / Bacteria: x      ======================Micro/Rad/Cardio=================  CXR: Reviewed  Echo:Reviewed  ======================================================  PAST MEDICAL & SURGICAL HISTORY:  No pertinent past medical history    No significant past surgical history      ====================ASSESSMENT ==============  Endocarditis, preop MVR   Pulmonary edema     Plan:  ====================== NEUROLOGY=====================  Continue close monitoring of neuro status     ==================== RESPIRATORY======================  Pulmonary edema   Supplemental O2 via 2L NC   Encourage incentive spirometry, continue pulse ox monitoring, follow ABGs     ====================CARDIOVASCULAR==================  TTE on 8/26 - EF 59%. flail posterior MV leaflet, torn choriade, torrential MR `  Admitted for ?endocarditis, preop MVR   Continue hemodynamic monitoring     ===================HEMATOLOGIC/ONC ===================  Monitor H&H/Plts      VTE prophylaxis, enoxaparin Injectable 40 milliGRAM(s) SubCutaneous daily    ===================== RENAL =========================  Continue monitoring urine output, I&OS, BUN/Cr   Replete lytes PRN. Keep K> 4 and Mg >2   Diuresis with Bumex and Aldactone     buMETAnide 2 milliGRAM(s) Oral daily  buMETAnide Infusion 0.5 mG/Hr (2.5 mL/Hr) IV Continuous <Continuous>  spironolactone 50 milliGRAM(s) Oral <User Schedule>    ==================== GASTROINTESTINAL===================  Tolerating regular PO diet   Bowel regimen with Miralax     GI prophylaxis, pantoprazole  Injectable 40 milliGRAM(s) IV Push daily  polyethylene glycol 3350 17 Gram(s) Oral daily  sodium chloride 0.9% lock flush 3 milliLiter(s) IV Push every 8 hours    =======================    ENDOCRINE  =====================  Continue monitoring blood glucose closely for need to initiate sliding scale     ========================INFECTIOUS DISEASE================  Afebrile, WBC within normal limits  Continue trending WBC and monitoring fever curve   Empiric coverage with ceftriaxone and vancomycin per ID   Pending BCx sent yesterday, will f/u.     cefTRIAXone   IVPB 2000 milliGRAM(s) IV Intermittent every 24 hours  vancomycin  IVPB 1250 milliGRAM(s) IV Intermittent every 12 hours          I have spent 35 minutes providing acute care for this critically ill patient     Patient requires continuous monitoring with bedside rhythm monitoring, pulse ox monitoring, and intermittent blood gas analysis. Care plan discussed with ICU care team. Patient remained critical and at risk for life threatening decompensation.     By signing my name below, I, Jessica No, attest that this documentation has been prepared under the direction and in the presence of Maksim Javier MD   Electronically signed: Jarrod Carbajal, 08-27-21 @ 06:02    I, Maksim Javier, personally performed the services described in this documentation. all medical record entries made by the scribe were at my direction and in my presence. I have reviewed the chart and agree that the record reflects my personal performance and is accurate and complete  Electronically signed: Maksim Javier MD

## 2021-08-28 LAB
ALBUMIN SERPL ELPH-MCNC: 4.1 G/DL — SIGNIFICANT CHANGE UP (ref 3.3–5)
ALP SERPL-CCNC: 64 U/L — SIGNIFICANT CHANGE UP (ref 40–120)
ALT FLD-CCNC: 10 U/L — SIGNIFICANT CHANGE UP (ref 10–45)
ANION GAP SERPL CALC-SCNC: 18 MMOL/L — HIGH (ref 5–17)
AST SERPL-CCNC: 8 U/L — LOW (ref 10–40)
BASOPHILS # BLD AUTO: 0.03 K/UL — SIGNIFICANT CHANGE UP (ref 0–0.2)
BASOPHILS NFR BLD AUTO: 0.3 % — SIGNIFICANT CHANGE UP (ref 0–2)
BILIRUB SERPL-MCNC: 0.5 MG/DL — SIGNIFICANT CHANGE UP (ref 0.2–1.2)
BUN SERPL-MCNC: 25 MG/DL — HIGH (ref 7–23)
CALCIUM SERPL-MCNC: 9.7 MG/DL — SIGNIFICANT CHANGE UP (ref 8.4–10.5)
CHLORIDE SERPL-SCNC: 96 MMOL/L — SIGNIFICANT CHANGE UP (ref 96–108)
CO2 SERPL-SCNC: 23 MMOL/L — SIGNIFICANT CHANGE UP (ref 22–31)
CREAT SERPL-MCNC: 1 MG/DL — SIGNIFICANT CHANGE UP (ref 0.5–1.3)
EOSINOPHIL # BLD AUTO: 0.11 K/UL — SIGNIFICANT CHANGE UP (ref 0–0.5)
EOSINOPHIL NFR BLD AUTO: 1.3 % — SIGNIFICANT CHANGE UP (ref 0–6)
GLUCOSE SERPL-MCNC: 132 MG/DL — HIGH (ref 70–99)
HCT VFR BLD CALC: 46.4 % — SIGNIFICANT CHANGE UP (ref 39–50)
HGB BLD-MCNC: 15.7 G/DL — SIGNIFICANT CHANGE UP (ref 13–17)
IMM GRANULOCYTES NFR BLD AUTO: 0.2 % — SIGNIFICANT CHANGE UP (ref 0–1.5)
LYMPHOCYTES # BLD AUTO: 1.5 K/UL — SIGNIFICANT CHANGE UP (ref 1–3.3)
LYMPHOCYTES # BLD AUTO: 17.2 % — SIGNIFICANT CHANGE UP (ref 13–44)
MAGNESIUM SERPL-MCNC: 2.2 MG/DL — SIGNIFICANT CHANGE UP (ref 1.6–2.6)
MCHC RBC-ENTMCNC: 28.7 PG — SIGNIFICANT CHANGE UP (ref 27–34)
MCHC RBC-ENTMCNC: 33.8 GM/DL — SIGNIFICANT CHANGE UP (ref 32–36)
MCV RBC AUTO: 84.8 FL — SIGNIFICANT CHANGE UP (ref 80–100)
MONOCYTES # BLD AUTO: 0.89 K/UL — SIGNIFICANT CHANGE UP (ref 0–0.9)
MONOCYTES NFR BLD AUTO: 10.2 % — SIGNIFICANT CHANGE UP (ref 2–14)
NEUTROPHILS # BLD AUTO: 6.18 K/UL — SIGNIFICANT CHANGE UP (ref 1.8–7.4)
NEUTROPHILS NFR BLD AUTO: 70.8 % — SIGNIFICANT CHANGE UP (ref 43–77)
NRBC # BLD: 0 /100 WBCS — SIGNIFICANT CHANGE UP (ref 0–0)
PHOSPHATE SERPL-MCNC: 5 MG/DL — HIGH (ref 2.5–4.5)
PLATELET # BLD AUTO: 299 K/UL — SIGNIFICANT CHANGE UP (ref 150–400)
POTASSIUM SERPL-MCNC: 3.5 MMOL/L — SIGNIFICANT CHANGE UP (ref 3.5–5.3)
POTASSIUM SERPL-SCNC: 3.5 MMOL/L — SIGNIFICANT CHANGE UP (ref 3.5–5.3)
PROT SERPL-MCNC: 7.1 G/DL — SIGNIFICANT CHANGE UP (ref 6–8.3)
RBC # BLD: 5.47 M/UL — SIGNIFICANT CHANGE UP (ref 4.2–5.8)
RBC # FLD: 11.6 % — SIGNIFICANT CHANGE UP (ref 10.3–14.5)
SODIUM SERPL-SCNC: 137 MMOL/L — SIGNIFICANT CHANGE UP (ref 135–145)
VANCOMYCIN TROUGH SERPL-MCNC: 14.9 UG/ML — SIGNIFICANT CHANGE UP (ref 10–20)
WBC # BLD: 8.73 K/UL — SIGNIFICANT CHANGE UP (ref 3.8–10.5)
WBC # FLD AUTO: 8.73 K/UL — SIGNIFICANT CHANGE UP (ref 3.8–10.5)

## 2021-08-28 PROCEDURE — 99291 CRITICAL CARE FIRST HOUR: CPT

## 2021-08-28 PROCEDURE — 99232 SBSQ HOSP IP/OBS MODERATE 35: CPT

## 2021-08-28 PROCEDURE — 99231 SBSQ HOSP IP/OBS SF/LOW 25: CPT

## 2021-08-28 PROCEDURE — 71045 X-RAY EXAM CHEST 1 VIEW: CPT | Mod: 26

## 2021-08-28 RX ORDER — SODIUM CHLORIDE 9 MG/ML
3 INJECTION INTRAMUSCULAR; INTRAVENOUS; SUBCUTANEOUS EVERY 8 HOURS
Refills: 0 | Status: DISCONTINUED | OUTPATIENT
Start: 2021-08-28 | End: 2021-08-28

## 2021-08-28 RX ORDER — POTASSIUM CHLORIDE 20 MEQ
20 PACKET (EA) ORAL ONCE
Refills: 0 | Status: COMPLETED | OUTPATIENT
Start: 2021-08-28 | End: 2021-08-28

## 2021-08-28 RX ADMIN — PANTOPRAZOLE SODIUM 40 MILLIGRAM(S): 20 TABLET, DELAYED RELEASE ORAL at 12:11

## 2021-08-28 RX ADMIN — Medication 166.67 MILLIGRAM(S): at 15:30

## 2021-08-28 RX ADMIN — SPIRONOLACTONE 50 MILLIGRAM(S): 25 TABLET, FILM COATED ORAL at 05:58

## 2021-08-28 RX ADMIN — Medication 20 MILLIGRAM(S): at 05:58

## 2021-08-28 RX ADMIN — SODIUM CHLORIDE 3 MILLILITER(S): 9 INJECTION INTRAMUSCULAR; INTRAVENOUS; SUBCUTANEOUS at 21:10

## 2021-08-28 RX ADMIN — SODIUM CHLORIDE 3 MILLILITER(S): 9 INJECTION INTRAMUSCULAR; INTRAVENOUS; SUBCUTANEOUS at 13:51

## 2021-08-28 RX ADMIN — SODIUM CHLORIDE 3 MILLILITER(S): 9 INJECTION INTRAMUSCULAR; INTRAVENOUS; SUBCUTANEOUS at 05:57

## 2021-08-28 RX ADMIN — Medication 166.67 MILLIGRAM(S): at 03:10

## 2021-08-28 RX ADMIN — SPIRONOLACTONE 50 MILLIGRAM(S): 25 TABLET, FILM COATED ORAL at 14:00

## 2021-08-28 RX ADMIN — SPIRONOLACTONE 50 MILLIGRAM(S): 25 TABLET, FILM COATED ORAL at 21:10

## 2021-08-28 RX ADMIN — CEFTRIAXONE 100 MILLIGRAM(S): 500 INJECTION, POWDER, FOR SOLUTION INTRAMUSCULAR; INTRAVENOUS at 14:02

## 2021-08-28 RX ADMIN — Medication 20 MILLIEQUIVALENT(S): at 05:57

## 2021-08-28 RX ADMIN — ENOXAPARIN SODIUM 40 MILLIGRAM(S): 100 INJECTION SUBCUTANEOUS at 12:11

## 2021-08-28 NOTE — PROGRESS NOTE ADULT - SUBJECTIVE AND OBJECTIVE BOX
Subjective: Pt states "Hello" denies any CP or SOB. No acute events overnight.     Telemetry:  SR 90 - 100  Vital Signs Last 24 Hrs  T(C): 36.6 (08-28-21 @ 11:15), Max: 36.6 (08-28-21 @ 04:00)  T(F): 97.9 (08-28-21 @ 11:15), Max: 97.9 (08-28-21 @ 04:00)  HR: 92 (08-28-21 @ 11:15) (81 - 109)  BP: 102/71 (08-28-21 @ 11:15) (102/71 - 122/76)  RR: 22 (08-28-21 @ 11:15) (13 - 49)  SpO2: 96% (08-28-21 @ 11:15) (92% - 98%)             08-27 @ 07:01  -  08-28 @ 07:00  --------------------------------------------------------  IN: 1645 mL / OUT: 2720 mL / NET: -1075 mL                            15.7   8.73  )-----------( 299      ( 28 Aug 2021 01:27 )             46.4     137  |  96  |  25<H>  ----------------------------<  132<H>  3.5   |  23  |  1.00              PHYSICAL EXAM  Neurology: A&Ox3, NAD  CV : RRR+S1S2  Lungs: Respirations non-labored, B/L BS CTA  Abdomen: Soft, NT/ND, +BSx4Q  : Voiding without difficulty  Extremities: B/L LE no edema, negative calf tenderness, +PP             MEDICATIONS  cefTRIAXone   IVPB 2000 milliGRAM(s) IV Intermittent every 24 hours  chlorhexidine 0.12% Liquid 15 milliLiter(s) Swish and Spit once  enoxaparin Injectable 40 milliGRAM(s) SubCutaneous daily  pantoprazole  Injectable 40 milliGRAM(s) IV Push daily  polyethylene glycol 3350 17 Gram(s) Oral daily  sodium chloride 0.9% lock flush 3 milliLiter(s) IV Push every 8 hours  spironolactone 50 milliGRAM(s) Oral <User Schedule>  torsemide 20 milliGRAM(s) Oral daily  vancomycin  IVPB 1250 milliGRAM(s) IV Intermittent every 12 hours      Discussed with Cardiothoracic Team at AM rounds.

## 2021-08-28 NOTE — PROGRESS NOTE ADULT - SUBJECTIVE AND OBJECTIVE BOX
MAX BOWER  MRN-[id number]  Patient is a 41y old  Male who presents with a chief complaint of Fever (26 Aug 2021 19:54)      HPI:  This is  a well groomed well developed 40y/o  male in general good health. Developed fever t max 104 x 48hrs presented to Urgent care on cardiac exam found to have + Murmur.  Seen by Dr. 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  Denies travel, Covid, sick contacts CP/PALPs and  dental work. Denies N/V/CP. Endorses orthopnea.   Admitted to Dr Rodrigues for  further management.  (26 Aug 2021 12:56)      Surgery/Hospital Course:  8/26 ?endocarditis, preop MVR; TTE EF 59%. flail posterior MV leaflet, torn choriade, torrential MR     Today:  Pending BCx sent yesterday, will f/u. Continue diuresis with IV Bumex and Aldactone.     ICU Vital Signs Last 24 Hrs  T(C): 36.2 (28 Aug 2021 08:00), Max: 36.6 (28 Aug 2021 04:00)  T(F): 97.2 (28 Aug 2021 08:00), Max: 97.9 (28 Aug 2021 04:00)  HR: 98 (28 Aug 2021 08:00) (81 - 115)  BP: 119/81 (28 Aug 2021 08:00) (104/65 - 121/73)  BP(mean): 91 (28 Aug 2021 08:00) (79 - 92)  ABP: --  ABP(mean): --  RR: 29 (28 Aug 2021 08:00) (13 - 46)  SpO2: 95% (28 Aug 2021 08:00) (91% - 98%)      Physical Exam:  Gen:  Awake, alert   CNS: non focal 	  Neck: no JVD  RES : rales noted b/l, no wheezing              CVS: Regular  rhythm. Normal S1/S2 +holosystolic murmur   Abd: Soft, non-distended. Bowel sounds present.  Skin: No rash.  Ext:  no edema    ============================I/O===========================  I&O's Detail    27 Aug 2021 07:01  -  28 Aug 2021 07:00  --------------------------------------------------------  IN:    Bumetanide: 15 mL    IV PiggyBack: 550 mL    Oral Fluid: 1080 mL  Total IN: 1645 mL    OUT:    Voided (mL): 2720 mL  Total OUT: 2720 mL    Total NET: -1075 mL      28 Aug 2021 07:01  -  28 Aug 2021 08:43  --------------------------------------------------------  IN:    Oral Fluid: 240 mL  Total IN: 240 mL    OUT:  Total OUT: 0 mL    Total NET: 240 mL    ============================ LABS =========================                          15.7   8.73  )-----------( 299      ( 28 Aug 2021 01:27 )             46.4   08-28    137  |  96  |  25<H>  ----------------------------<  132<H>  3.5   |  23  |  1.00    Ca    9.7      28 Aug 2021 01:27  Phos  5.0     08-28  Mg     2.2     08-28    TPro  7.1  /  Alb  4.1  /  TBili  0.5  /  DBili  x   /  AST  8<L>  /  ALT  10  /  AlkPhos  64  08-28    ABG - ( 26 Aug 2021 22:13 )  pH, Arterial: 7.42  pH, Blood: x     /  pCO2: 40    /  pO2: 65    / HCO3: 26    / Base Excess: 1.3   /  SaO2: 95.5      ======================Micro/Rad/Cardio=================  CXR: Reviewed  Echo:Reviewed  ======================================================  PAST MEDICAL & SURGICAL HISTORY:  No pertinent past medical history    No significant past surgical history    ====================ASSESSMENT ==============  Endocarditis, preop MVR   Pulmonary edema     Plan:  ====================== NEUROLOGY=====================  Continue close monitoring of neuro status     ==================== RESPIRATORY======================  Pulmonary edema   Supplemental O2 via 2L NC   Encourage incentive spirometry, continue pulse ox monitoring, follow ABGs     ====================CARDIOVASCULAR==================  TTE on 8/26 - EF 59%. flail posterior MV leaflet, torn choriade, torrential MR `  Admitted for ?endocarditis, preop MVR   Continue hemodynamic monitoring     ===================HEMATOLOGIC/ONC ===================  Monitor H&H/Plts      VTE prophylaxis, enoxaparin Injectable 40 milliGRAM(s) SubCutaneous daily    ===================== RENAL =========================  Continue monitoring urine output, I&OS, BUN/Cr   Replete lytes PRN. Keep K> 4 and Mg >2   Diuresis with Bumex and Aldactone     buMETAnide 2 milliGRAM(s) Oral daily  buMETAnide Infusion 0.5 mG/Hr (2.5 mL/Hr) IV Continuous <Continuous>  spironolactone 50 milliGRAM(s) Oral <User Schedule>    ==================== GASTROINTESTINAL===================  Tolerating regular PO diet   Bowel regimen with Miralax     GI prophylaxis, pantoprazole  Injectable 40 milliGRAM(s) IV Push daily  polyethylene glycol 3350 17 Gram(s) Oral daily  sodium chloride 0.9% lock flush 3 milliLiter(s) IV Push every 8 hours    =======================    ENDOCRINE  =====================  Continue monitoring blood glucose closely for need to initiate sliding scale     ========================INFECTIOUS DISEASE================  Afebrile, WBC within normal limits  Continue trending WBC and monitoring fever curve   Empiric coverage with ceftriaxone and vancomycin per ID   Pending BCx sent yesterday, will f/u.     cefTRIAXone   IVPB 2000 milliGRAM(s) IV Intermittent every 24 hours  vancomycin  IVPB 1250 milliGRAM(s) IV Intermittent every 12 hours    I have spent 35 minutes providing acute care for this critically ill patient     Patient requires continuous monitoring with bedside rhythm monitoring, pulse ox monitoring, and intermittent blood gas analysis. Care plan discussed with ICU care team. Patient remained critical and at risk for life threatening decompensation.

## 2021-08-28 NOTE — PROGRESS NOTE ADULT - ASSESSMENT
Flail MV with fevers and clinical CHF.     IE is possible although unclear mechanism, no apparent risk factor, no vegetations on TTE and blood cultures are negative to date.     Could a noninfectious cause produce fever? Spontaneous rupture? May have had previous MVR. Not ischemic. Myxomatous?   Sangita CC, Leyda R, Benji M, Kelly S, Miley N, Doron ZARATE. Case : A 54-Year-Old Man with Fever, Cough, and Hypoxemia. N Engl J Med. 2019 Jul 25;381(4):359-369. doi: [id number]. PMID: [id number].    Suggest  -monitor blood cultures   -empiric Ceftriaxone 2GM IV q24h   -empiric Vanc 1.25GM IV q12h - trough 14.8 this morning was early, continue same dose, probably stop tomorrow if cultures remain negative   -valve surgery planned for Mon per [facility]     Star Dwyer MD   Infectious Disease   Pager [phone]   After 5PM and on weekends please page fellow on call or call [phone] Flail MV with fevers and clinical CHF.     IE is possible although unclear mechanism, no apparent risk factor, no vegetations on TTE and blood cultures are negative to date.     Could a noninfectious cause produce fever? Spontaneous rupture? May have had previous MVR. Not ischemic. Myxomatous?   Sangita CC, Leyda R, Benji M, Kelly S, Miley N, Doron ZARATE. Case : A 54-Year-Old Man with Fever, Cough, and Hypoxemia. N Engl J Med. 2019 Jul 25;381(4):359-369. doi: 10.[id number]. PMID: [id number].    Suggest  -monitor blood cultures   -empiric Ceftriaxone 2GM IV q24h   -empiric Vanc 1.25GM IV q12h - trough 14.8 this morning was early, continue same dose  -valve surgery planned for Mon per [facility]     Star Dwyer MD   Infectious Disease   Pager [phone]   After 5PM and on weekends please page fellow on call or call [phone]

## 2021-08-28 NOTE — PROGRESS NOTE ADULT - SUBJECTIVE AND OBJECTIVE BOX
HPI: He feels fine. No dyspnea. Urinating consistent with diuretics.  Wife at bedside.    The medical history is unchanged.  Review Of Systems:  No orthopnea or leg edema. The remainder of his ROS is unchanged.    Medications:  cefTRIAXone   IVPB 2000 milliGRAM(s) IV Intermittent every 24 hours  chlorhexidine 0.12% Liquid 15 milliLiter(s) Swish and Spit once  enoxaparin Injectable 40 milliGRAM(s) SubCutaneous daily  pantoprazole  Injectable 40 milliGRAM(s) IV Push daily  polyethylene glycol 3350 17 Gram(s) Oral daily  sodium chloride 0.9% lock flush 3 milliLiter(s) IV Push every 8 hours  spironolactone 50 milliGRAM(s) Oral <User Schedule>  torsemide 20 milliGRAM(s) Oral daily  vancomycin  IVPB 1250 milliGRAM(s) IV Intermittent every 12 hours    Allergies    No Known Allergies    Intolerances        Vitals:  T(F): 97.9 (08-28-21 @ 11:15), Max: 97.9 (08-28-21 @ 04:00)  HR: 92 (08-28-21 @ 11:15) (81 - 109)  BP: 102/71 (08-28-21 @ 11:15) (102/71 - 122/76)  RR: 22 (08-28-21 @ 11:15) (13 - 49)  SpO2: 96% (08-28-21 @ 11:15) (92% - 98%)  Daily     Daily   I&O's Summary    27 Aug 2021 07:01  -  28 Aug 2021 07:00  --------------------------------------------------------  IN: 1645 mL / OUT: 2720 mL / NET: -1075 mL    28 Aug 2021 07:01  -  28 Aug 2021 17:11  --------------------------------------------------------  IN: 890 mL / OUT: 1050 mL / NET: -160 mL        Physical Exam:  Appearance: NAD  HEENT: No icterus or JVD  Cardiovascular: Regular rhythm, normal S1, S2, 4/6 HSM at apex., No edema  Respiratory: clear to ascultation bilaterally, no crackles or wheeze.  Gastrointestinal: +BS, soft  Musculoskeletal: No clubbing  Neurologic: Non-focal, alert and oriented, Mood & affect appropriate  Lymphatic: No lymphadenopathy  Skin: Warm and moist, no cyanosis                          15.7   8.73  )-----------( 299      ( 28 Aug 2021 01:27 )             46.4     08-28    137  |  96  |  25<H>  ----------------------------<  132<H>  3.5   |  23  |  1.00    Ca    9.7      28 Aug 2021 01:27  Phos  5.0     08-28  Mg     2.2     08-28    TPro  7.1  /  Alb  4.1  /  TBili  0.5  /  DBili  x   /  AST  8<L>  /  ALT  10  /  AlkPhos  64  08-28           Serum Pro-Brain Natriuretic Peptide: 1454 pg/mL (08-26 @ 18:24)        Interpretation of Telemetry: SR 80 to 110's

## 2021-08-28 NOTE — PROGRESS NOTE ADULT - ASSESSMENT
41 year old with a history of cardiac murmur presents with orthopnea, fever and newly diagnosed severe MR with signs of CHF on exam.  Concern for endocarditis is diminishing. ESR not elevated much, BLood cultures are negative. Prophylactic IV antibiotics for now. Agree with ID assessment that fever may be from acute HF.  CHF: improved with diuretics. Continue current regimen. Monitor K.  MVP/flail leaflet with severe regurgitation: will require repair/replacement. Plan for MVR on Monday.  DVT prophy.  Plan discussed with wife at bedside. All questions answered.

## 2021-08-28 NOTE — PROGRESS NOTE ADULT - PROBLEM SELECTOR PLAN 2
ID Following -Dr Dwyer  Blood cultures 8/26 NGTD  Continue empiric Ceftriaxone 2GM IV q24h and Vanc 1.25GM IV q12h for now

## 2021-08-28 NOTE — PROGRESS NOTE ADULT - SUBJECTIVE AND OBJECTIVE BOX
Follow Up:  Fever, flail MV    Interval History/ROS: Feels pretty good. Breathing comfortably. No more fevers. No diarrhea.     Allergies  No Known Allergies        ANTIMICROBIALS:  cefTRIAXone   IVPB 2000 every 24 hours  vancomycin  IVPB 1250 every 12 hours      OTHER MEDS:  chlorhexidine 0.12% Liquid 15 milliLiter(s) Swish and Spit once  enoxaparin Injectable 40 milliGRAM(s) SubCutaneous daily  pantoprazole  Injectable 40 milliGRAM(s) IV Push daily  polyethylene glycol 3350 17 Gram(s) Oral daily  sodium chloride 0.9% lock flush 3 milliLiter(s) IV Push every 8 hours  spironolactone 50 milliGRAM(s) Oral <User Schedule>  torsemide 20 milliGRAM(s) Oral daily      Vital Signs Last 24 Hrs  T(C): 36.6 (28 Aug 2021 11:15), Max: 36.6 (28 Aug 2021 04:00)  T(F): 97.9 (28 Aug 2021 11:15), Max: 97.9 (28 Aug 2021 04:00)  HR: 92 (28 Aug 2021 11:15) (81 - 109)  BP: 102/71 (28 Aug 2021 11:15) (102/71 - 122/76)  BP(mean): 83 (28 Aug 2021 10:00) (80 - 93)  RR: 22 (28 Aug 2021 11:15) (13 - 49)  SpO2: 96% (28 Aug 2021 11:15) (92% - 98%)    Physical Exam:  General: awake, alert, non toxic  Head: atraumatic, normocephalic  Eye: normal sclera and conjunctiva  Cardio: regular rate   Respiratory: nonlabored on nasal cannula   abd: soft, bowel sounds present, no tenderness  vascular: no phlebitis   Neurologic: no focal deficit  psych: normal affect                          15.7   8.73  )-----------( 299      ( 28 Aug 2021 01:27 )             46.4       08-28    137  |  96  |  25<H>  ----------------------------<  132<H>  3.5   |  23  |  1.00    Ca    9.7      28 Aug 2021 01:27  Phos  5.0     08-28  Mg     2.2     08-28    TPro  7.1  /  Alb  4.1  /  TBili  0.5  /  DBili  x   /  AST  8<L>  /  ALT  10  /  AlkPhos  64  08-28      Urinalysis Basic - ( 26 Aug 2021 19:07 )    Color: Yellow / Appearance: Clear / SG: >1.050 / pH: x  Gluc: x / Ketone: Small  / Bili: Negative / Urobili: Negative   Blood: x / Protein: 100 / Nitrite: Negative   Leuk Esterase: Negative / RBC: x / WBC x   Sq Epi: x / Non Sq Epi: x / Bacteria: x        MICROBIOLOGY:  Vancomycin Level, Trough: 14.9 ug/mL (08-28-21 @ 01:27)    Culture - Blood (collected 08-26-21 @ 17:13)  Source: .Blood Blood-Peripheral  Preliminary Report (08-27-21 @ 18:01):    No growth to date.    Culture - Blood (collected 08-26-21 @ 17:13)  Source: .Blood Blood-Peripheral  Preliminary Report (08-27-21 @ 18:01):    No growth to date.    Rapid RVP Result: NotDetec (08-26 @ 18:24)    RADIOLOGY:  Images below reviewed personally  Xray Chest 1 View- PORTABLE-Routine (Xray Chest 1 View- PORTABLE-Routine in AM.) (08.28.21 @ 02:16)   Pulmonary vascular congestion  Small bilateral pleural effusion.    Xray Chest 1 View- PORTABLE-Routine (Xray Chest 1 View- PORTABLE-Routine in AM.) (08.27.21 @ 02:12)   Pulmonary congestion and bilateral pleural effusions.

## 2021-08-29 LAB
ANION GAP SERPL CALC-SCNC: 14 MMOL/L — SIGNIFICANT CHANGE UP (ref 5–17)
APTT BLD: 31.7 SEC — SIGNIFICANT CHANGE UP (ref 27.5–35.5)
BASOPHILS # BLD AUTO: 0.03 K/UL — SIGNIFICANT CHANGE UP (ref 0–0.2)
BASOPHILS NFR BLD AUTO: 0.4 % — SIGNIFICANT CHANGE UP (ref 0–2)
BLD GP AB SCN SERPL QL: NEGATIVE — SIGNIFICANT CHANGE UP
BUN SERPL-MCNC: 24 MG/DL — HIGH (ref 7–23)
CALCIUM SERPL-MCNC: 9.8 MG/DL — SIGNIFICANT CHANGE UP (ref 8.4–10.5)
CHLORIDE SERPL-SCNC: 97 MMOL/L — SIGNIFICANT CHANGE UP (ref 96–108)
CO2 SERPL-SCNC: 26 MMOL/L — SIGNIFICANT CHANGE UP (ref 22–31)
CREAT SERPL-MCNC: 1.19 MG/DL — SIGNIFICANT CHANGE UP (ref 0.5–1.3)
EOSINOPHIL # BLD AUTO: 0.13 K/UL — SIGNIFICANT CHANGE UP (ref 0–0.5)
EOSINOPHIL NFR BLD AUTO: 1.7 % — SIGNIFICANT CHANGE UP (ref 0–6)
FIBRINOGEN PPP-MCNC: 830 MG/DL — HIGH (ref 290–520)
GLUCOSE SERPL-MCNC: 108 MG/DL — HIGH (ref 70–99)
HCT VFR BLD CALC: 42.2 % — SIGNIFICANT CHANGE UP (ref 39–50)
HGB BLD-MCNC: 14.5 G/DL — SIGNIFICANT CHANGE UP (ref 13–17)
IMM GRANULOCYTES NFR BLD AUTO: 0.3 % — SIGNIFICANT CHANGE UP (ref 0–1.5)
INR BLD: 1.09 RATIO — SIGNIFICANT CHANGE UP (ref 0.88–1.16)
LYMPHOCYTES # BLD AUTO: 1.36 K/UL — SIGNIFICANT CHANGE UP (ref 1–3.3)
LYMPHOCYTES # BLD AUTO: 18.2 % — SIGNIFICANT CHANGE UP (ref 13–44)
MCHC RBC-ENTMCNC: 29.4 PG — SIGNIFICANT CHANGE UP (ref 27–34)
MCHC RBC-ENTMCNC: 34.4 GM/DL — SIGNIFICANT CHANGE UP (ref 32–36)
MCV RBC AUTO: 85.4 FL — SIGNIFICANT CHANGE UP (ref 80–100)
MONOCYTES # BLD AUTO: 0.93 K/UL — HIGH (ref 0–0.9)
MONOCYTES NFR BLD AUTO: 12.4 % — SIGNIFICANT CHANGE UP (ref 2–14)
NEUTROPHILS # BLD AUTO: 5.01 K/UL — SIGNIFICANT CHANGE UP (ref 1.8–7.4)
NEUTROPHILS NFR BLD AUTO: 67 % — SIGNIFICANT CHANGE UP (ref 43–77)
NRBC # BLD: 0 /100 WBCS — SIGNIFICANT CHANGE UP (ref 0–0)
PLATELET # BLD AUTO: 287 K/UL — SIGNIFICANT CHANGE UP (ref 150–400)
POTASSIUM SERPL-MCNC: 4 MMOL/L — SIGNIFICANT CHANGE UP (ref 3.5–5.3)
POTASSIUM SERPL-SCNC: 4 MMOL/L — SIGNIFICANT CHANGE UP (ref 3.5–5.3)
PROTHROM AB SERPL-ACNC: 13 SEC — SIGNIFICANT CHANGE UP (ref 10.6–13.6)
RBC # BLD: 4.94 M/UL — SIGNIFICANT CHANGE UP (ref 4.2–5.8)
RBC # FLD: 11.4 % — SIGNIFICANT CHANGE UP (ref 10.3–14.5)
RH IG SCN BLD-IMP: POSITIVE — SIGNIFICANT CHANGE UP
SARS-COV-2 RNA SPEC QL NAA+PROBE: DETECTED
SARS-COV-2 RNA SPEC QL NAA+PROBE: DETECTED
SODIUM SERPL-SCNC: 137 MMOL/L — SIGNIFICANT CHANGE UP (ref 135–145)
WBC # BLD: 7.48 K/UL — SIGNIFICANT CHANGE UP (ref 3.8–10.5)
WBC # FLD AUTO: 7.48 K/UL — SIGNIFICANT CHANGE UP (ref 3.8–10.5)

## 2021-08-29 PROCEDURE — 99152 MOD SED SAME PHYS/QHP 5/>YRS: CPT | Mod: 59

## 2021-08-29 PROCEDURE — 99232 SBSQ HOSP IP/OBS MODERATE 35: CPT

## 2021-08-29 PROCEDURE — 93458 L HRT ARTERY/VENTRICLE ANGIO: CPT | Mod: 26

## 2021-08-29 RX ORDER — CHLORHEXIDINE GLUCONATE 213 G/1000ML
15 SOLUTION TOPICAL
Refills: 0 | Status: DISCONTINUED | OUTPATIENT
Start: 2021-08-29 | End: 2021-08-29

## 2021-08-29 RX ORDER — CHLORHEXIDINE GLUCONATE 213 G/1000ML
1 SOLUTION TOPICAL
Refills: 0 | Status: DISCONTINUED | OUTPATIENT
Start: 2021-08-29 | End: 2021-08-29

## 2021-08-29 RX ORDER — VANCOMYCIN HCL 1 G
1250 VIAL (EA) INTRAVENOUS ONCE
Refills: 0 | Status: DISCONTINUED | OUTPATIENT
Start: 2021-08-29 | End: 2021-08-29

## 2021-08-29 RX ADMIN — SODIUM CHLORIDE 3 MILLILITER(S): 9 INJECTION INTRAMUSCULAR; INTRAVENOUS; SUBCUTANEOUS at 07:19

## 2021-08-29 RX ADMIN — SPIRONOLACTONE 50 MILLIGRAM(S): 25 TABLET, FILM COATED ORAL at 13:41

## 2021-08-29 RX ADMIN — Medication 166.67 MILLIGRAM(S): at 17:41

## 2021-08-29 RX ADMIN — SPIRONOLACTONE 50 MILLIGRAM(S): 25 TABLET, FILM COATED ORAL at 07:18

## 2021-08-29 RX ADMIN — Medication 20 MILLIGRAM(S): at 07:17

## 2021-08-29 RX ADMIN — PANTOPRAZOLE SODIUM 40 MILLIGRAM(S): 20 TABLET, DELAYED RELEASE ORAL at 13:41

## 2021-08-29 RX ADMIN — Medication 166.67 MILLIGRAM(S): at 07:16

## 2021-08-29 RX ADMIN — SPIRONOLACTONE 50 MILLIGRAM(S): 25 TABLET, FILM COATED ORAL at 21:35

## 2021-08-29 RX ADMIN — SODIUM CHLORIDE 3 MILLILITER(S): 9 INJECTION INTRAMUSCULAR; INTRAVENOUS; SUBCUTANEOUS at 21:30

## 2021-08-29 RX ADMIN — CEFTRIAXONE 100 MILLIGRAM(S): 500 INJECTION, POWDER, FOR SOLUTION INTRAMUSCULAR; INTRAVENOUS at 13:41

## 2021-08-29 RX ADMIN — SODIUM CHLORIDE 3 MILLILITER(S): 9 INJECTION INTRAMUSCULAR; INTRAVENOUS; SUBCUTANEOUS at 13:42

## 2021-08-29 NOTE — CHART NOTE - NSCHARTNOTEFT_GEN_A_CORE
COVID-19 PCR . (08.29.21 @ 08:35) Detected  Suspect false positive. High spike antibody titer. On room air.   Repeat PCR in lab.   If still positive this would not be a contraindication to MV surgery.   If true positive could consider monoclonal therapy although would require approval of the medical director and signed patient consent. I don't think it would add much with his titers.     Discussed with ELIN Dwyer MD   Infectious Disease   Pager [phone]   After 5PM and on weekends please page fellow on call or call [phone]

## 2021-08-29 NOTE — PROGRESS NOTE ADULT - ASSESSMENT
Patient is a 41y old  Male who presents with a chief complaint of Fever echo demonstrates flail posterior leaflet torn chordae and torrential MR.

## 2021-08-29 NOTE — PROGRESS NOTE ADULT - SUBJECTIVE AND OBJECTIVE BOX
Cardiac Surgery Pre-op Note:    CC: Patient is a 41y old  Male who presents with a chief complaint of Fever  found to have flail posterior leaflet torn chordae, torrential MR(29 Aug 2021 10:26)      Referring Physician: Dr Lisker                                                                                                           Surgeon: Dr Rodrigues    Procedure: (Date) (Procedure) MVR    Allergies    No Known Allergies    Intolerances        HPI:  This is  a well groomed well developed 40y/o  male in general good health. Developed fever t max 104 x 48hrs presented to Urgent care on cardiac exam found to have + Murmur.  Seen by Dr. Lisker ( Cardiologist) this am transthoracic ECHO reveals  severe  mitral  torn anterior chordae   severe  torrential MR   instructed to present to [facility] ED for MV endocarditis.  Denies travel, Covid, sick contacts CP/PALPs and  dental work. Denies N/V/CP. Endorses orthopnea.   Admitted to Dr Rodrigues for  further management.  (26 Aug 2021 12:56)      PAST MEDICAL & SURGICAL HISTORY:  No pertinent past medical history    No significant past surgical history        MEDICATIONS  (STANDING):  cefTRIAXone   IVPB 2000 milliGRAM(s) IV Intermittent every 24 hours  chlorhexidine 0.12% Liquid 15 milliLiter(s) Swish and Spit once  chlorhexidine 0.12% Liquid 15 milliLiter(s) Oral Mucosa two times a day  chlorhexidine 4% Liquid 1 Application(s) Topical two times a day  enoxaparin Injectable 40 milliGRAM(s) SubCutaneous daily  pantoprazole  Injectable 40 milliGRAM(s) IV Push daily  polyethylene glycol 3350 17 Gram(s) Oral daily  sodium chloride 0.9% lock flush 3 milliLiter(s) IV Push every 8 hours  spironolactone 50 milliGRAM(s) Oral <User Schedule>  torsemide 20 milliGRAM(s) Oral daily  vancomycin  IVPB 1250 milliGRAM(s) IV Intermittent once  vancomycin  IVPB 1250 milliGRAM(s) IV Intermittent every 12 hours    MEDICATIONS  (PRN):        Labs:                        14.5   7.48  )-----------( 287      ( 29 Aug 2021 06:08 )             42.2     08-29    137  |  97  |  24<H>  ----------------------------<  108<H>  4.0   |  26  |  1.19    Ca    9.8      29 Aug 2021 06:08  Phos  5.0     08-28  Mg     2.2     08-28    TPro  7.1  /  Alb  4.1  /  TBili  0.5  /  DBili  x   /  AST  8<L>  /  ALT  10  /  AlkPhos  64  08-28    PT/INR - ( 29 Aug 2021 06:08 )   PT: 13.0 sec;   INR: 1.09 ratio         PTT - ( 29 Aug 2021 06:08 )  PTT:31.7 sec    Blood Type: ABO Interpretation: O (08-29 @ 06:11)    HGB A1C: 5.4  Prealbumin:   Pro-BNP: Serum Pro-Brain Natriuretic Peptide: 1454 pg/mL (08-26 @ 18:24)    Thyroid Panel: 08-27 @ 00:24/2.26  --/--/--    MRSA: MRSA PCR Result.: NotDetec (08-27 @ 00:05)   / MSSA:       CXR: c< from: Xray Chest 1 View- PORTABLE-Routine (Xray Chest 1 View- PORTABLE-Routine in AM.) (08.28.21 @ 02:16) >  FINDINGS:  The heart size is not adequately assessed on single view..  Bilateral lower lobe eighth opacities.  Small bilateral pleural effusions, left greater than right.  Mild pulmonary vascular congestion again seen.  There is no pneumothorax.    IMPRESSION:  Pulmonary vascular congestion  Small bilateral pleural effusion.    < end of copied text >      EKG: e< from: 12 Lead ECG (08.26.21 @ 15:09) >  Ventricular Rate 98 BPM    Atrial Rate 98 BPM    P-R Interval 174 ms    QRS Duration 98 ms    Q-T Interval 352 ms    QTC Calculation(Bazett) 449 ms    P Axis 60 degrees    R Axis 62 degrees    T Axis 43 degrees    Diagnosis Line NORMAL SINUS RHYTHM  POSSIBLE LEFT ATRIAL ENLARGEMENT  BORDERLINE ECG  WHEN COMPARED WITH ECG OF 08/26/2021 12:02:53  NO SIGNIFICANT CHANGE WAS FOUND    < end of copied text >      Carotid Duplex:  < from: VA Duplex Carotid, Bilat (08.27.21 @ 11:41) >  RIGHT:  PROX CCA = 78 cm/s  DIST CCA = 91 cm/s  PROX ICA = 55 cm/s  DIST ICA = 67 cm/s  ECA = 75 cm/s    LEFT:  PROX CCA = 105 cm/s  DIST CCA = 86 cm/s  PROX ICA = 64 cm/s  DIST ICA = 55 cm/s  ECA = 74 cm/s    Antegrade flow is noted within both vertebral arteries.    IMPRESSION: No significant hemodynamic stenosis of either carotid artery.    Measurement of carotid stenosis is based on velocity parameters that correlate the residual internal carotid diameter with that of the more distal vessel in accordance with a method such as the [employer] Symptomatic Carotid Endarterectomy Trial (NASCET).    < end of copied text >      PFT's:    Echocardiogram: `< from: Transthoracic Echocardiogram (08.26.21 @ 15:11) >  Conclusions:  1. Flail posterior mitral leaflet - appears to be a torn  chordae attached.  *Torrential, severe, anteriorly-directed mitral  regurgitation.*  2. Severely dilated left atrium.  LA volume index = 84  cc/m2.  3. Normal left ventricular internal dimensions and wall  thicknesses.  4. Normal left ventricular systolic function. No segmental  wall motion abnormalities.  5. Normal right ventricular size and function.  6. Bilateral pleural effusions.    < end of copied text >      Cardiac catheterization:` nonobstructive coronary disease    Vein Mapping:    Gen: WN/WD NAD  Neuro: AAOx3, nonfocal  Pulm: CTA B/L  CV: RRR, S1S2  Abd: Soft, NT, ND +BS  Ext: No edema, + peripheral pulses      Pt has AICD/PPM [ ] Yes  [ x] No             Brand Name:  Pre-op Beta Blocker ordered within 24 hrs of surgery (CABG ONLY)?  [ ] Yes  [ ] No  If not, Why?  Type & Cross  [x ] Yes  [ ] No  NPO after Midnight [x ] Yes  [ ] No  Pre-op ABX ordered, to be taped on chart:  [ x] Yes  [ ] No     Hibiclens/Peridex ordered [x ] Yes  [ ] No  Intraop on Hold: PRBCs, CXR, CATHY [x ]   Consent obtained  [ x] Yes  [ ] No

## 2021-08-29 NOTE — PROGRESS NOTE ADULT - SUBJECTIVE AND OBJECTIVE BOX
HPI:     The medical history is unchanged.  Review Of Systems:  No orthopnea or change in leg edema. The remainder of his ROS is unchanged.    Medications:  cefTRIAXone   IVPB 2000 milliGRAM(s) IV Intermittent every 24 hours  chlorhexidine 0.12% Liquid 15 milliLiter(s) Swish and Spit once  enoxaparin Injectable 40 milliGRAM(s) SubCutaneous daily  pantoprazole  Injectable 40 milliGRAM(s) IV Push daily  polyethylene glycol 3350 17 Gram(s) Oral daily  sodium chloride 0.9% lock flush 3 milliLiter(s) IV Push every 8 hours  spironolactone 50 milliGRAM(s) Oral <User Schedule>  torsemide 20 milliGRAM(s) Oral daily  vancomycin  IVPB 1250 milliGRAM(s) IV Intermittent every 12 hours    Allergies    No Known Allergies    Intolerances        Vitals:  T(F): 98.2 (21 @ 05:27), Max: 98.5 (21 @ 18:54)  HR: 88 (21 @ 05:27) (88 - 105)  BP: 105/73 (21 @ 05:27) (101/65 - 108/69)  RR: 18 (21 @ 05:27) (18 - 43)  SpO2: 93% (21 @ 05:27) (93% - 96%)  Daily     Daily Weight in k.2 (29 Aug 2021 07:37)  I&O's Summary    28 Aug 2021 07:  -  29 Aug 2021 07:00  --------------------------------------------------------  IN: 1780 mL / OUT: 1600 mL / NET: 180 mL    29 Aug 2021 07:  -  29 Aug 2021 10:27  --------------------------------------------------------  IN: 240 mL / OUT: 1050 mL / NET: -810 mL        Physical Exam:  Appearance: NAD  HEENT: No icterus or JVD  Cardiovascular: Regular rhythm, normal S1, S2, No murmurs or rubs, No edema  Respiratory: clear to ascultation bilaterally, no crackles or wheeze.  Gastrointestinal: +BS, soft  Musculoskeletal: No clubbing  Neurologic: Non-focal, alert and oriented, Mood & affect appropriate  Lymphatic: No lymphadenopathy  Skin: Warm and moist, no cyanosis                          14.5   7.48  )-----------( 287      ( 29 Aug 2021 06:08 )             42.2         137  |  97  |  24<H>  ----------------------------<  108<H>  4.0   |  26  |  1.19    Ca    9.8      29 Aug 2021 06:08  Phos  5.0       Mg     2.2         TPro  7.1  /  Alb  4.1  /  TBili  0.5  /  DBili  x   /  AST  8<L>  /  ALT  10  /  AlkPhos  64           PT/INR - ( 29 Aug 2021 06:08 )   PT: 13.0 sec;   INR: 1.09 ratio         PTT - ( 29 Aug 2021 06:08 )  PTT:31.7 sec  Serum Pro-Brain Natriuretic Peptide: 1454 pg/mL ( @ 18:24)        Interpretation of Telemetry: Sinus rhythm/sinus tach.   Echo: < from: Transthoracic Echocardiogram (21 @ 15:11) >  Conclusions:  1. Flail posterior mitral leaflet - appears to be a torn  chordae attached.  *Torrential, severe, anteriorly-directed mitral  regurgitation.*  2. Severely dilated left atrium.  LA volume index = 84  cc/m2.  3. Normal left ventricular internal dimensions and wall  thicknesses.  4. Normal left ventricular systolic function. No segmental  wall motion abnormalities.  5. Normal right ventricular size and function.  6. Bilateral pleural effusions.  *** No previous Echo exam.  ------------------------------------------------------------------------  Confirmed on  2021 - 17:06:00 by Be Andre M.D.  ------------------------------------------------------------------------    < end of copied text >      Cath:  HPI: He feels fine. No chest pain or dyspnea.  Reviewed his medical history and he notes recent increase in vaping (nicotine) and recent cocaine use.    The medical history is unchanged.  Review Of Systems:  No orthopnea or  leg edema. The remainder of his ROS is unchanged.    Medications:  cefTRIAXone   IVPB 2000 milliGRAM(s) IV Intermittent every 24 hours  chlorhexidine 0.12% Liquid 15 milliLiter(s) Swish and Spit once  enoxaparin Injectable 40 milliGRAM(s) SubCutaneous daily  pantoprazole  Injectable 40 milliGRAM(s) IV Push daily  polyethylene glycol 3350 17 Gram(s) Oral daily  sodium chloride 0.9% lock flush 3 milliLiter(s) IV Push every 8 hours  spironolactone 50 milliGRAM(s) Oral <User Schedule>  torsemide 20 milliGRAM(s) Oral daily  vancomycin  IVPB 1250 milliGRAM(s) IV Intermittent every 12 hours    Allergies    No Known Allergies    Intolerances        Vitals:  T(F): 98.2 (21 @ 05:27), Max: 98.5 (21 @ 18:54)  HR: 88 (21 @ 05:27) (88 - 105)  BP: 105/73 (21 @ 05:27) (101/65 - 108/69)  RR: 18 (21 @ 05:27) (18 - 43)  SpO2: 93% (21 @ 05:27) (93% - 96%)  Daily     Daily Weight in k.2 (29 Aug 2021 07:37)  I&O's Summary    28 Aug 2021 07:  -  29 Aug 2021 07:00  --------------------------------------------------------  IN: 1780 mL / OUT: 1600 mL / NET: 180 mL    29 Aug 2021 07:  -  29 Aug 2021 10:27  --------------------------------------------------------  IN: 240 mL / OUT: 1050 mL / NET: -810 mL        Physical Exam:  Appearance: NAD  HEENT: No icterus or JVD  Cardiovascular: Regular rhythm, normal S1, S2, 4/6 HSM at apex, No edema  Respiratory: clear to ascultation bilaterally, no crackles or wheeze.  Gastrointestinal: +BS, soft  Musculoskeletal: No clubbing  Neurologic: Non-focal, alert and oriented, Mood & affect appropriate  Skin: Warm and moist, no cyanosis                          14.5   7.48  )-----------( 287      ( 29 Aug 2021 06:08 )             42.2     08    137  |  97  |  24<H>  ----------------------------<  108<H>  4.0   |  26  |  1.19    Ca    9.8      29 Aug 2021 06:08  Phos  5.0     08-28  Mg     2.2     08-28    TPro  7.1  /  Alb  4.1  /  TBili  0.5  /  DBili  x   /  AST  8<L>  /  ALT  10  /  AlkPhos  64  08-28         PT/INR - ( 29 Aug 2021 06:08 )   PT: 13.0 sec;   INR: 1.09 ratio         PTT - ( 29 Aug 2021 06:08 )  PTT:31.7 sec  Serum Pro-Brain Natriuretic Peptide: 1454 pg/mL ( @ 18:24)        Interpretation of Telemetry: Sinus rhythm/sinus tach.   Echo: < from: Transthoracic Echocardiogram (21 @ 15:11) >  Conclusions:  1. Flail posterior mitral leaflet - appears to be a torn  chordae attached.  *Torrential, severe, anteriorly-directed mitral  regurgitation.*  2. Severely dilated left atrium.  LA volume index = 84  cc/m2.  3. Normal left ventricular internal dimensions and wall  thicknesses.  4. Normal left ventricular systolic function. No segmental  wall motion abnormalities.  5. Normal right ventricular size and function.  6. Bilateral pleural effusions.  *** No previous Echo exam.  ------------------------------------------------------------------------  Confirmed on  2021 - 17:06:00 by Be Andre M.D.  ------------------------------------------------------------------------    < end of copied text >      Cath:

## 2021-08-29 NOTE — PROGRESS NOTE ADULT - ASSESSMENT
41 year old with a history of cardiac murmur presents with orthopnea, fever and newly diagnosed severe MR with signs of CHF on exam.  Concern for endocarditis is diminishing. ESR not elevated much, BLood cultures are negative. Prophylactic IV antibiotics for now. Agree with ID assessment that fever may be from acute HF.  CHF: improved with diuretics. Continue current regimen. Monitor K.  MVP/flail leaflet with severe regurgitation: will require repair/replacement. Plan for MVR on Monday.  DVT prophy.  Plan discussed with wife at bedside. All questions answered. 41 year old with a history of cardiac murmur presents with orthopnea, fever and newly diagnosed severe MR with signs of CHF on exam.  No sign of endocarditis: ESR not elevated much, BLood cultures are negative. Prophylactic IV antibiotics for now. Agree with ID assessment that fever may be from acute HF.   CHF: improved with diuretics. Continue current regimen. Monitor K.  MVP/flail leaflet with severe regurgitation: will require repair/replacement. Plan for MVR on Monday.  Concern for ischemic source given stimulant and nicotine use. Will arrange for left heart cath today. Discussed with Dr. Brody.  DVT prophy.  Plan discussed with wife at bedside. All questions answered.

## 2021-08-29 NOTE — PROGRESS NOTE ADULT - PROBLEM SELECTOR PLAN 1
npo after midnight  abx on call to OR, T& C  Vancomycin on call to OR  Repeat Covid PCR
TTE: Flail posterior mitral leaflet - appears to be a torn chordae attached. Torrential, severe, anteriorly-directed mitral regurgitation.   Beta blocker on hold to allow permissive tachycardia  Continue torsemide 20 daily and aldactone 50 q8h  Daily BMP - supplement K prn  C/W GI prophylaxis on protonix and DVT prophylaxis on SQ Lovenox.   Plan for MVR Monday 8/30 with Dr. Rodrigues

## 2021-08-30 LAB
ANION GAP SERPL CALC-SCNC: 13 MMOL/L — SIGNIFICANT CHANGE UP (ref 5–17)
BASOPHILS # BLD AUTO: 0.03 K/UL — SIGNIFICANT CHANGE UP (ref 0–0.2)
BASOPHILS NFR BLD AUTO: 0.4 % — SIGNIFICANT CHANGE UP (ref 0–2)
BUN SERPL-MCNC: 20 MG/DL — SIGNIFICANT CHANGE UP (ref 7–23)
CALCIUM SERPL-MCNC: 9.3 MG/DL — SIGNIFICANT CHANGE UP (ref 8.4–10.5)
CHLORIDE SERPL-SCNC: 95 MMOL/L — LOW (ref 96–108)
CO2 SERPL-SCNC: 26 MMOL/L — SIGNIFICANT CHANGE UP (ref 22–31)
CREAT SERPL-MCNC: 1.17 MG/DL — SIGNIFICANT CHANGE UP (ref 0.5–1.3)
D DIMER BLD IA.RAPID-MCNC: 435 NG/ML DDU — HIGH
EOSINOPHIL # BLD AUTO: 0.08 K/UL — SIGNIFICANT CHANGE UP (ref 0–0.5)
EOSINOPHIL NFR BLD AUTO: 1 % — SIGNIFICANT CHANGE UP (ref 0–6)
GLUCOSE SERPL-MCNC: 108 MG/DL — HIGH (ref 70–99)
HCT VFR BLD CALC: 40.1 % — SIGNIFICANT CHANGE UP (ref 39–50)
HGB BLD-MCNC: 13.7 G/DL — SIGNIFICANT CHANGE UP (ref 13–17)
IMM GRANULOCYTES NFR BLD AUTO: 0.2 % — SIGNIFICANT CHANGE UP (ref 0–1.5)
LYMPHOCYTES # BLD AUTO: 1.03 K/UL — SIGNIFICANT CHANGE UP (ref 1–3.3)
LYMPHOCYTES # BLD AUTO: 12.6 % — LOW (ref 13–44)
MCHC RBC-ENTMCNC: 29.5 PG — SIGNIFICANT CHANGE UP (ref 27–34)
MCHC RBC-ENTMCNC: 34.2 GM/DL — SIGNIFICANT CHANGE UP (ref 32–36)
MCV RBC AUTO: 86.2 FL — SIGNIFICANT CHANGE UP (ref 80–100)
MONOCYTES # BLD AUTO: 1.06 K/UL — HIGH (ref 0–0.9)
MONOCYTES NFR BLD AUTO: 13 % — SIGNIFICANT CHANGE UP (ref 2–14)
NEUTROPHILS # BLD AUTO: 5.93 K/UL — SIGNIFICANT CHANGE UP (ref 1.8–7.4)
NEUTROPHILS NFR BLD AUTO: 72.8 % — SIGNIFICANT CHANGE UP (ref 43–77)
NRBC # BLD: 0 /100 WBCS — SIGNIFICANT CHANGE UP (ref 0–0)
PLATELET # BLD AUTO: 258 K/UL — SIGNIFICANT CHANGE UP (ref 150–400)
POTASSIUM SERPL-MCNC: 3.6 MMOL/L — SIGNIFICANT CHANGE UP (ref 3.5–5.3)
POTASSIUM SERPL-SCNC: 3.6 MMOL/L — SIGNIFICANT CHANGE UP (ref 3.5–5.3)
RBC # BLD: 4.65 M/UL — SIGNIFICANT CHANGE UP (ref 4.2–5.8)
RBC # FLD: 11.4 % — SIGNIFICANT CHANGE UP (ref 10.3–14.5)
SODIUM SERPL-SCNC: 134 MMOL/L — LOW (ref 135–145)
WBC # BLD: 8.15 K/UL — SIGNIFICANT CHANGE UP (ref 3.8–10.5)
WBC # FLD AUTO: 8.15 K/UL — SIGNIFICANT CHANGE UP (ref 3.8–10.5)

## 2021-08-30 PROCEDURE — 99233 SBSQ HOSP IP/OBS HIGH 50: CPT

## 2021-08-30 PROCEDURE — 99232 SBSQ HOSP IP/OBS MODERATE 35: CPT

## 2021-08-30 PROCEDURE — 99231 SBSQ HOSP IP/OBS SF/LOW 25: CPT

## 2021-08-30 RX ORDER — POTASSIUM CHLORIDE 20 MEQ
40 PACKET (EA) ORAL ONCE
Refills: 0 | Status: DISCONTINUED | OUTPATIENT
Start: 2021-08-30 | End: 2021-08-30

## 2021-08-30 RX ORDER — POTASSIUM CHLORIDE 20 MEQ
40 PACKET (EA) ORAL ONCE
Refills: 0 | Status: COMPLETED | OUTPATIENT
Start: 2021-08-30 | End: 2021-08-30

## 2021-08-30 RX ORDER — ENOXAPARIN SODIUM 100 MG/ML
60 INJECTION SUBCUTANEOUS EVERY 12 HOURS
Refills: 0 | Status: DISCONTINUED | OUTPATIENT
Start: 2021-08-30 | End: 2021-08-31

## 2021-08-30 RX ADMIN — SODIUM CHLORIDE 3 MILLILITER(S): 9 INJECTION INTRAMUSCULAR; INTRAVENOUS; SUBCUTANEOUS at 13:22

## 2021-08-30 RX ADMIN — PANTOPRAZOLE SODIUM 40 MILLIGRAM(S): 20 TABLET, DELAYED RELEASE ORAL at 13:22

## 2021-08-30 RX ADMIN — SPIRONOLACTONE 50 MILLIGRAM(S): 25 TABLET, FILM COATED ORAL at 21:36

## 2021-08-30 RX ADMIN — SPIRONOLACTONE 50 MILLIGRAM(S): 25 TABLET, FILM COATED ORAL at 13:22

## 2021-08-30 RX ADMIN — SODIUM CHLORIDE 3 MILLILITER(S): 9 INJECTION INTRAMUSCULAR; INTRAVENOUS; SUBCUTANEOUS at 21:40

## 2021-08-30 RX ADMIN — CEFTRIAXONE 100 MILLIGRAM(S): 500 INJECTION, POWDER, FOR SOLUTION INTRAMUSCULAR; INTRAVENOUS at 13:22

## 2021-08-30 RX ADMIN — Medication 20 MILLIGRAM(S): at 05:10

## 2021-08-30 RX ADMIN — Medication 40 MILLIEQUIVALENT(S): at 18:44

## 2021-08-30 RX ADMIN — SPIRONOLACTONE 50 MILLIGRAM(S): 25 TABLET, FILM COATED ORAL at 05:09

## 2021-08-30 RX ADMIN — ENOXAPARIN SODIUM 60 MILLIGRAM(S): 100 INJECTION SUBCUTANEOUS at 18:34

## 2021-08-30 RX ADMIN — Medication 166.67 MILLIGRAM(S): at 05:09

## 2021-08-30 RX ADMIN — SODIUM CHLORIDE 3 MILLILITER(S): 9 INJECTION INTRAMUSCULAR; INTRAVENOUS; SUBCUTANEOUS at 06:24

## 2021-08-30 NOTE — PROGRESS NOTE ADULT - ASSESSMENT
41 year old with a history of cardiac murmur presents with orthopnea, fever and newly diagnosed severe MR with signs of CHF on exam. Now with asymptomatic COVID infection.  Etiology of MR: No sign of epicardial CAD on cath. No definite endocarditis: ESR not elevated much, BLood cultures are negative. Prophylactic IV antibiotics with ceftriaxone till OR when definitive diagnosis can be made.    CHF: improved with diuretics. Continue torsemide and aldactone orally. Monitor K.  MVP/flail leaflet with severe regurgitation: will require repair/replacement, stable CHF now.   OHS postponed till after risk of COVID inflammatory state has passed. Discussed with ID and CTS re: convalescence at home from covid. Outpatient Telehealt to monitor CHF. Pt instructed to obtain a pulse oximeter for home use as well as isolation protocol for him.   Decision re: MAB to prevent rehospitalization prior to surgery vs. rely on high Antibody titers from vaccination. ID to discuss and make recommendations.     All questions answered.

## 2021-08-30 NOTE — PROGRESS NOTE ADULT - ASSESSMENT
Patient is a 40 y/o male who presents with a chief complaint of fever; echo demonstrates flail posterior leaflet torn chordae and torrential MR.  Pre-op initially for today, but ended up testing positive for COVID, confirmed w/ repeat test.  No cough or congestion.  No sick contacts.  Surgery on hold.  MIDLINE to be placed for IV abx.  Home and come back for surgery in ~10 days     Problem/Plan - 1:  ·  Problem: Mitral valve incompetence.   IV ceftriaxone for ?6 week course likely.    Pt will get MIDLINE today;   He agrees to try monoclonal antibody; ID following and will arrange.  On Torsemide for congestion on CXR.      Home ?Tuesday/Wed pending MAB and MIDLINE placement--> IV abx home infusion set up.      Pt to come back in about 10 days for MVR.  Date to be finalized, ?9/8/21.

## 2021-08-30 NOTE — PROGRESS NOTE ADULT - SUBJECTIVE AND OBJECTIVE BOX
Patient discussed on morning rounds with Dr. Rodrigues and Dr. Grayson    Pre op for MVR, tested positive for COVID    SUBJECTIVE ASSESSMENT:  41y Male         Vital Signs Last 24 Hrs  T(C): 37 (30 Aug 2021 04:07), Max: 37.1 (29 Aug 2021 13:23)  T(F): 98.6 (30 Aug 2021 04:07), Max: 98.8 (29 Aug 2021 13:23)  HR: 96 (30 Aug 2021 04:07) (73 - 105)  BP: 102/69 (30 Aug 2021 04:07) (99/77 - 117/74)  BP(mean): 83 (29 Aug 2021 14:00) (83 - 83)  RR: 18 (30 Aug 2021 04:07) (12 - 20)  SpO2: 93% (30 Aug 2021 04:07) (92% - 99%)  I&O's Detail    29 Aug 2021 07:01  -  30 Aug 2021 07:00  --------------------------------------------------------  IN:    IV PiggyBack: 250 mL    IV PiggyBack: 100 mL    Oral Fluid: 480 mL  Total IN: 830 mL    OUT:    Voided (mL): 2450 mL  Total OUT: 2450 mL    Total NET: -1620 mL      30 Aug 2021 07:01  -  30 Aug 2021 08:34  --------------------------------------------------------  IN:  Total IN: 0 mL    OUT:    Voided (mL): 1550 mL  Total OUT: 1550 mL    Total NET: -1550 mL          CHEST TUBE:  Yes/No. AIR LEAKS: Yes/No. Suction / H2O SEAL.   EMELINA DRAIN:  Yes/No.  EPICARDIAL WIRES: Yes/No.  TIE DOWNS: Yes/No.  LIANG: Yes/No.    PHYSICAL EXAM:    General:     Neurological:    Cardiovascular:    Respiratory:    Gastrointestinal:    Extremities:    Vascular:    Incision Sites:    LABS:                        13.7   8.15  )-----------( 258      ( 30 Aug 2021 04:56 )             40.1       COUMADIN:  Yes/No. REASON: .    PT/INR - ( 29 Aug 2021 06:08 )   PT: 13.0 sec;   INR: 1.09 ratio         PTT - ( 29 Aug 2021 06:08 )  PTT:31.7 sec    08-30    134<L>  |  95<L>  |  20  ----------------------------<  108<H>  3.6   |  26  |  1.17    Ca    9.3      30 Aug 2021 04:56            MEDICATIONS  (STANDING):  cefTRIAXone   IVPB 2000 milliGRAM(s) IV Intermittent every 24 hours  enoxaparin Injectable 60 milliGRAM(s) SubCutaneous every 12 hours  pantoprazole  Injectable 40 milliGRAM(s) IV Push daily  polyethylene glycol 3350 17 Gram(s) Oral daily  potassium chloride    Tablet ER 40 milliEquivalent(s) Oral once  sodium chloride 0.9% lock flush 3 milliLiter(s) IV Push every 8 hours  spironolactone 50 milliGRAM(s) Oral <User Schedule>  torsemide 20 milliGRAM(s) Oral daily    MEDICATIONS  (PRN):        RADIOLOGY & ADDITIONAL TESTS:     Patient discussed on morning rounds with Dr. Rodrigues and Dr. Grayson    Pre op for MVR, tested positive for COVID    SUBJECTIVE ASSESSMENT:  Patient feels "fine".  He denies any fever/chills, cough, SOB, dizziness, CP, N/V/D.  Incidental positive for COVID, now on isolation.  MIDLINE to be placed today for long-term antibiotics for working diagnosis of endocarditis, culture negative. Patient agrees to try monoclonal antibody infusion.           Vital Signs Last 24 Hrs  T(C): 37 (30 Aug 2021 04:07), Max: 37.1 (29 Aug 2021 13:23)  T(F): 98.6 (30 Aug 2021 04:07), Max: 98.8 (29 Aug 2021 13:23)  HR: 96 (30 Aug 2021 04:07) (73 - 105)  BP: 102/69 (30 Aug 2021 04:07) (99/77 - 117/74)  BP(mean): 83 (29 Aug 2021 14:00) (83 - 83)  RR: 18 (30 Aug 2021 04:07) (12 - 20)  SpO2: 93% (30 Aug 2021 04:07) (92% - 99%)  I&O's Detail    29 Aug 2021 07:01  -  30 Aug 2021 07:00  --------------------------------------------------------  IN:    IV PiggyBack: 250 mL    IV PiggyBack: 100 mL    Oral Fluid: 480 mL  Total IN: 830 mL    OUT:    Voided (mL): 2450 mL  Total OUT: 2450 mL    Total NET: -1620 mL      30 Aug 2021 07:01  -  30 Aug 2021 08:34  --------------------------------------------------------  IN:  Total IN: 0 mL    OUT:    Voided (mL): 1550 mL  Total OUT: 1550 mL    Total NET: -1550 mL    PHYSICAL EXAM:    GEN: NAD, looks comfortable  Psych: Mood appropriate  Neuro: A&Ox3.  No focal deficits.  Moving all extremities.   HEENT: No obvious abnormalities  CV: S1S2, regular, Very loud murmur heard throughout precordium.  Loudest at LSB/apex.   No carotid bruits.  No JVD  Lungs: Clear B/L.  No wheezing, rales or rhonchi  ABD: Soft, non-tender, non-distended.  +Bowel sounds  EXT: Warm and well perfused.  No peripheral edema noted  Musculoskeletal: Moving all extremities with normal ROM, no joint swelling  PV: Pedal pulses palpable    LABS:                        13.7   8.15  )-----------( 258      ( 30 Aug 2021 04:56 )             40.1       PT/INR - ( 29 Aug 2021 06:08 )   PT: 13.0 sec;   INR: 1.09 ratio         PTT - ( 29 Aug 2021 06:08 )  PTT:31.7 sec    08-30    134<L>  |  95<L>  |  20  ----------------------------<  108<H>  3.6   |  26  |  1.17    Ca    9.3      30 Aug 2021 04:56        MEDICATIONS  (STANDING):  cefTRIAXone   IVPB 2000 milliGRAM(s) IV Intermittent every 24 hours  enoxaparin Injectable 60 milliGRAM(s) SubCutaneous every 12 hours  pantoprazole  Injectable 40 milliGRAM(s) IV Push daily  polyethylene glycol 3350 17 Gram(s) Oral daily  potassium chloride    Tablet ER 40 milliEquivalent(s) Oral once  sodium chloride 0.9% lock flush 3 milliLiter(s) IV Push every 8 hours  spironolactone 50 milliGRAM(s) Oral <User Schedule>  torsemide 20 milliGRAM(s) Oral daily    MEDICATIONS  (PRN):        RADIOLOGY & ADDITIONAL TESTS:  < from: Xray Chest 1 View- PORTABLE-Routine (Xray Chest 1 View- PORTABLE-Routine in AM.) (08.28.21 @ 02:16) >    IMPRESSION:  Pulmonary vascular congestion  Small bilateral pleural effusion.    < end of copied text >

## 2021-08-30 NOTE — PROGRESS NOTE ADULT - SUBJECTIVE AND OBJECTIVE BOX
HPI: He feels okay. No chest pain or dyspnea.  lost ~5 lb with diuresis.  Tested positive for covid on routine swab (x2)  OHS delayed.  Transferred to COVID unit.  Pt examined with full covid precautions in place.     Review Of Systems:  No orthopnea or   leg edema. The remainder of his ROS is unchanged.    Medications:  cefTRIAXone   IVPB 2000 milliGRAM(s) IV Intermittent every 24 hours  enoxaparin Injectable 60 milliGRAM(s) SubCutaneous every 12 hours  pantoprazole  Injectable 40 milliGRAM(s) IV Push daily  polyethylene glycol 3350 17 Gram(s) Oral daily  potassium chloride    Tablet ER 40 milliEquivalent(s) Oral once  sodium chloride 0.9% lock flush 3 milliLiter(s) IV Push every 8 hours  spironolactone 50 milliGRAM(s) Oral <User Schedule>  torsemide 20 milliGRAM(s) Oral daily    Allergies    No Known Allergies    Intolerances        Vitals:  T(F): 98.6 (08-30-21 @ 04:07), Max: 98.8 (08-29-21 @ 13:23)  HR: 96 (08-30-21 @ 04:07) (73 - 105)  BP: 102/69 (08-30-21 @ 04:07) (99/77 - 117/74)  RR: 18 (08-30-21 @ 04:07) (12 - 20)  SpO2: 93% (08-30-21 @ 04:07) (92% - 99%)  Daily     Daily   I&O's Summary    29 Aug 2021 07:01  -  30 Aug 2021 07:00  --------------------------------------------------------  IN: 830 mL / OUT: 2450 mL / NET: -1620 mL    30 Aug 2021 07:01  -  30 Aug 2021 09:38  --------------------------------------------------------  IN: 0 mL / OUT: 1550 mL / NET: -1550 mL        Physical Exam:  Appearance: NAD  HEENT: No icterus or JVD  Cardiovascular: Regular rhythm, normal S1, S2, HSM at apex. No edema  Respiratory: clear to ascultation bilaterally, Decreased breath sounds at bases.  Gastrointestinal: +BS, soft  Musculoskeletal: No clubbing  Neurologic: Non-focal, alert and oriented, Mood & affect appropriate  Lymphatic: No lymphadenopathy  Skin: Warm and moist, no cyanosis                          13.7   8.15  )-----------( 258      ( 30 Aug 2021 04:56 )             40.1     08-30    134<L>  |  95<L>  |  20  ----------------------------<  108<H>  3.6   |  26  |  1.17    Ca    9.3      30 Aug 2021 04:56           PT/INR - ( 29 Aug 2021 06:08 )   PT: 13.0 sec;   INR: 1.09 ratio         PTT - ( 29 Aug 2021 06:08 )  PTT:31.7 sec  Serum Pro-Brain Natriuretic Peptide: 1454 pg/mL (08-26 @ 18:24)        Interpretation of Telemetry: SR, ST.

## 2021-08-30 NOTE — PROGRESS NOTE ADULT - ASSESSMENT
Flail MV with fevers and pulmonary edema.   IE is possible although unclear mechanism, no apparent risk factor, no vegetations on TTE and blood cultures are negative to date.     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 I don't think monoclonal therapy will add much benefit in his case. However, I don't think it'll hurt him either and he does meet criteria.     Suggest  -if patient consents to monoclonal therapy (form left with him) he can receive it (approved by medical director) and go home   -monitor blood cultures   -stop Vancomycin   -empiric Ceftriaxone 2GM IV q24h via midline until valve surgery (waiting 10 days of isolation)   -advised him to return promptly if symptoms worsen at home     Discussed with CT surgery     Star Dwyer MD   Infectious Disease   Pager [phone]   After 5PM and on weekends please page fellow on call or call [phone]

## 2021-08-30 NOTE — PROGRESS NOTE ADULT - SUBJECTIVE AND OBJECTIVE BOX
Follow Up:  possible IE. COVID    Interval History/ROS: Tested positive for COVID twice yesterday, now on isolation. Feels fine, no dyspnea or cough. No pain or fevers.     Allergies  No Known Allergies        ANTIMICROBIALS:  cefTRIAXone   IVPB 2000 every 24 hours      OTHER MEDS:  enoxaparin Injectable 60 milliGRAM(s) SubCutaneous every 12 hours  pantoprazole  Injectable 40 milliGRAM(s) IV Push daily  polyethylene glycol 3350 17 Gram(s) Oral daily  sodium chloride 0.9% lock flush 3 milliLiter(s) IV Push every 8 hours  spironolactone 50 milliGRAM(s) Oral <User Schedule>  torsemide 20 milliGRAM(s) Oral daily      Vital Signs Last 24 Hrs  T(C): 36.8 (30 Aug 2021 11:00), Max: 37 (30 Aug 2021 04:07)  T(F): 98.3 (30 Aug 2021 11:00), Max: 98.6 (30 Aug 2021 04:07)  HR: 94 (30 Aug 2021 11:00) (92 - 105)  BP: 100/65 (30 Aug 2021 11:00) (100/65 - 106/61)  BP(mean): --  RR: 18 (30 Aug 2021 11:00) (18 - 18)  SpO2: 95% (30 Aug 2021 11:00) (93% - 97%)    Physical Exam:  General: awake, alert, non toxic  Head: atraumatic, normocephalic  Eye: normal sclera and conjunctiva  Cardio: regular rate   Respiratory: nonlabored on room air, clear bilaterally, no wheezing  abd: nondistended   : no collazo  Musculoskeletal: no focal joint swelling  vascular: no phlebitis   Skin: no rash  Neurologic: no focal deficit  psych: normal affect                          13.7   8.15  )-----------( 258      ( 30 Aug 2021 04:56 )             40.1       08-30    134<L>  |  95<L>  |  20  ----------------------------<  108<H>  3.6   |  26  |  1.17    Ca    9.3      30 Aug 2021 04:56      MICROBIOLOGY:  Culture - Blood (collected 08-26-21 @ 17:13)  Source: .Blood Blood-Peripheral  Preliminary Report (08-27-21 @ 18:01):    No growth to date.    Culture - Blood (collected 08-26-21 @ 17:13)  Source: .Blood Blood-Peripheral  Preliminary Report (08-27-21 @ 18:01):    No growth to date.    Rapid RVP Result: NotDetec (08-26 @ 18:24)    RADIOLOGY:  Images below reviewed personally  Xray Chest 1 View- PORTABLE-Routine (Xray Chest 1 View- PORTABLE-Routine in AM.) (08.28.21 @ 02:16)   Pulmonary vascular congestion  Small bilateral pleural effusion.

## 2021-08-31 ENCOUNTER — TRANSCRIPTION ENCOUNTER (OUTPATIENT)
Age: 41
End: 2021-08-31

## 2021-08-31 VITALS
HEART RATE: 74 BPM | RESPIRATION RATE: 17 BRPM | OXYGEN SATURATION: 96 % | TEMPERATURE: 99 F | DIASTOLIC BLOOD PRESSURE: 74 MMHG | SYSTOLIC BLOOD PRESSURE: 115 MMHG

## 2021-08-31 LAB
ANION GAP SERPL CALC-SCNC: 13 MMOL/L — SIGNIFICANT CHANGE UP (ref 5–17)
BUN SERPL-MCNC: 23 MG/DL — SIGNIFICANT CHANGE UP (ref 7–23)
CALCIUM SERPL-MCNC: 9.4 MG/DL — SIGNIFICANT CHANGE UP (ref 8.4–10.5)
CHLORIDE SERPL-SCNC: 95 MMOL/L — LOW (ref 96–108)
CO2 SERPL-SCNC: 25 MMOL/L — SIGNIFICANT CHANGE UP (ref 22–31)
CREAT SERPL-MCNC: 1.14 MG/DL — SIGNIFICANT CHANGE UP (ref 0.5–1.3)
CULTURE RESULTS: SIGNIFICANT CHANGE UP
CULTURE RESULTS: SIGNIFICANT CHANGE UP
GLUCOSE SERPL-MCNC: 98 MG/DL — SIGNIFICANT CHANGE UP (ref 70–99)
HCT VFR BLD CALC: 41.3 % — SIGNIFICANT CHANGE UP (ref 39–50)
HGB BLD-MCNC: 14.1 G/DL — SIGNIFICANT CHANGE UP (ref 13–17)
MAGNESIUM SERPL-MCNC: 2.5 MG/DL — SIGNIFICANT CHANGE UP (ref 1.6–2.6)
MCHC RBC-ENTMCNC: 29.7 PG — SIGNIFICANT CHANGE UP (ref 27–34)
MCHC RBC-ENTMCNC: 34.1 GM/DL — SIGNIFICANT CHANGE UP (ref 32–36)
MCV RBC AUTO: 87.1 FL — SIGNIFICANT CHANGE UP (ref 80–100)
NRBC # BLD: 0 /100 WBCS — SIGNIFICANT CHANGE UP (ref 0–0)
PLATELET # BLD AUTO: 258 K/UL — SIGNIFICANT CHANGE UP (ref 150–400)
POTASSIUM SERPL-MCNC: 4.1 MMOL/L — SIGNIFICANT CHANGE UP (ref 3.5–5.3)
POTASSIUM SERPL-SCNC: 4.1 MMOL/L — SIGNIFICANT CHANGE UP (ref 3.5–5.3)
RBC # BLD: 4.74 M/UL — SIGNIFICANT CHANGE UP (ref 4.2–5.8)
RBC # FLD: 11.4 % — SIGNIFICANT CHANGE UP (ref 10.3–14.5)
SODIUM SERPL-SCNC: 133 MMOL/L — LOW (ref 135–145)
SPECIMEN SOURCE: SIGNIFICANT CHANGE UP
SPECIMEN SOURCE: SIGNIFICANT CHANGE UP
WBC # BLD: 7.41 K/UL — SIGNIFICANT CHANGE UP (ref 3.8–10.5)
WBC # FLD AUTO: 7.41 K/UL — SIGNIFICANT CHANGE UP (ref 3.8–10.5)

## 2021-08-31 PROCEDURE — 82435 ASSAY OF BLOOD CHLORIDE: CPT

## 2021-08-31 PROCEDURE — 99285 EMERGENCY DEPT VISIT HI MDM: CPT | Mod: 25

## 2021-08-31 PROCEDURE — 87635 SARS-COV-2 COVID-19 AMP PRB: CPT

## 2021-08-31 PROCEDURE — 82947 ASSAY GLUCOSE BLOOD QUANT: CPT

## 2021-08-31 PROCEDURE — 83036 HEMOGLOBIN GLYCOSYLATED A1C: CPT

## 2021-08-31 PROCEDURE — 99152 MOD SED SAME PHYS/QHP 5/>YRS: CPT

## 2021-08-31 PROCEDURE — 83735 ASSAY OF MAGNESIUM: CPT

## 2021-08-31 PROCEDURE — 93880 EXTRACRANIAL BILAT STUDY: CPT

## 2021-08-31 PROCEDURE — 85025 COMPLETE CBC W/AUTO DIFF WBC: CPT

## 2021-08-31 PROCEDURE — 87640 STAPH A DNA AMP PROBE: CPT

## 2021-08-31 PROCEDURE — 93306 TTE W/DOPPLER COMPLETE: CPT

## 2021-08-31 PROCEDURE — 86901 BLOOD TYPING SEROLOGIC RH(D): CPT

## 2021-08-31 PROCEDURE — C1894: CPT

## 2021-08-31 PROCEDURE — 84484 ASSAY OF TROPONIN QUANT: CPT

## 2021-08-31 PROCEDURE — 80061 LIPID PANEL: CPT

## 2021-08-31 PROCEDURE — C1887: CPT

## 2021-08-31 PROCEDURE — U0003: CPT

## 2021-08-31 PROCEDURE — 80202 ASSAY OF VANCOMYCIN: CPT

## 2021-08-31 PROCEDURE — C1769: CPT

## 2021-08-31 PROCEDURE — 94010 BREATHING CAPACITY TEST: CPT

## 2021-08-31 PROCEDURE — 84100 ASSAY OF PHOSPHORUS: CPT

## 2021-08-31 PROCEDURE — 82330 ASSAY OF CALCIUM: CPT

## 2021-08-31 PROCEDURE — 80048 BASIC METABOLIC PNL TOTAL CA: CPT

## 2021-08-31 PROCEDURE — 83880 ASSAY OF NATRIURETIC PEPTIDE: CPT

## 2021-08-31 PROCEDURE — 93005 ELECTROCARDIOGRAM TRACING: CPT

## 2021-08-31 PROCEDURE — 85379 FIBRIN DEGRADATION QUANT: CPT

## 2021-08-31 PROCEDURE — 85384 FIBRINOGEN ACTIVITY: CPT

## 2021-08-31 PROCEDURE — 82803 BLOOD GASES ANY COMBINATION: CPT

## 2021-08-31 PROCEDURE — 86900 BLOOD TYPING SEROLOGIC ABO: CPT

## 2021-08-31 PROCEDURE — 84443 ASSAY THYROID STIM HORMONE: CPT

## 2021-08-31 PROCEDURE — 71275 CT ANGIOGRAPHY CHEST: CPT | Mod: ME

## 2021-08-31 PROCEDURE — 71046 X-RAY EXAM CHEST 2 VIEWS: CPT

## 2021-08-31 PROCEDURE — 99239 HOSP IP/OBS DSCHRG MGMT >30: CPT

## 2021-08-31 PROCEDURE — 85730 THROMBOPLASTIN TIME PARTIAL: CPT

## 2021-08-31 PROCEDURE — 86769 SARS-COV-2 COVID-19 ANTIBODY: CPT

## 2021-08-31 PROCEDURE — 99232 SBSQ HOSP IP/OBS MODERATE 35: CPT

## 2021-08-31 PROCEDURE — 82550 ASSAY OF CK (CPK): CPT

## 2021-08-31 PROCEDURE — 87040 BLOOD CULTURE FOR BACTERIA: CPT

## 2021-08-31 PROCEDURE — G1004: CPT

## 2021-08-31 PROCEDURE — 0225U NFCT DS DNA&RNA 21 SARSCOV2: CPT

## 2021-08-31 PROCEDURE — 80053 COMPREHEN METABOLIC PANEL: CPT

## 2021-08-31 PROCEDURE — 93458 L HRT ARTERY/VENTRICLE ANGIO: CPT

## 2021-08-31 PROCEDURE — 71045 X-RAY EXAM CHEST 1 VIEW: CPT

## 2021-08-31 PROCEDURE — 84132 ASSAY OF SERUM POTASSIUM: CPT

## 2021-08-31 PROCEDURE — 86140 C-REACTIVE PROTEIN: CPT

## 2021-08-31 PROCEDURE — 99153 MOD SED SAME PHYS/QHP EA: CPT

## 2021-08-31 PROCEDURE — 85014 HEMATOCRIT: CPT

## 2021-08-31 PROCEDURE — 85018 HEMOGLOBIN: CPT

## 2021-08-31 PROCEDURE — 85027 COMPLETE CBC AUTOMATED: CPT

## 2021-08-31 PROCEDURE — 87641 MR-STAPH DNA AMP PROBE: CPT

## 2021-08-31 PROCEDURE — 85652 RBC SED RATE AUTOMATED: CPT

## 2021-08-31 PROCEDURE — 84295 ASSAY OF SERUM SODIUM: CPT

## 2021-08-31 PROCEDURE — 85610 PROTHROMBIN TIME: CPT

## 2021-08-31 PROCEDURE — 36569 INSJ PICC 5 YR+ W/O IMAGING: CPT

## 2021-08-31 PROCEDURE — C1751: CPT

## 2021-08-31 PROCEDURE — 86850 RBC ANTIBODY SCREEN: CPT

## 2021-08-31 PROCEDURE — U0005: CPT

## 2021-08-31 PROCEDURE — 83605 ASSAY OF LACTIC ACID: CPT

## 2021-08-31 RX ORDER — SPIRONOLACTONE 25 MG/1
2 TABLET, FILM COATED ORAL
Qty: 84 | Refills: 0
Start: 2021-08-31 | End: 2021-09-13

## 2021-08-31 RX ORDER — SODIUM CHLORIDE 9 MG/ML
250 INJECTION INTRAMUSCULAR; INTRAVENOUS; SUBCUTANEOUS
Refills: 0 | Status: COMPLETED | OUTPATIENT
Start: 2021-08-31 | End: 2021-08-31

## 2021-08-31 RX ORDER — CEFTRIAXONE 500 MG/1
2 INJECTION, POWDER, FOR SOLUTION INTRAMUSCULAR; INTRAVENOUS
Qty: 0 | Refills: 0 | DISCHARGE
Start: 2021-08-31

## 2021-08-31 RX ADMIN — CEFTRIAXONE 100 MILLIGRAM(S): 500 INJECTION, POWDER, FOR SOLUTION INTRAMUSCULAR; INTRAVENOUS at 11:38

## 2021-08-31 RX ADMIN — SODIUM CHLORIDE 3 MILLILITER(S): 9 INJECTION INTRAMUSCULAR; INTRAVENOUS; SUBCUTANEOUS at 05:04

## 2021-08-31 RX ADMIN — ENOXAPARIN SODIUM 60 MILLIGRAM(S): 100 INJECTION SUBCUTANEOUS at 05:09

## 2021-08-31 RX ADMIN — Medication 20 MILLIGRAM(S): at 05:09

## 2021-08-31 RX ADMIN — SODIUM CHLORIDE 25 MILLILITER(S): 9 INJECTION INTRAMUSCULAR; INTRAVENOUS; SUBCUTANEOUS at 16:34

## 2021-08-31 RX ADMIN — SODIUM CHLORIDE 3 MILLILITER(S): 9 INJECTION INTRAMUSCULAR; INTRAVENOUS; SUBCUTANEOUS at 11:59

## 2021-08-31 RX ADMIN — PANTOPRAZOLE SODIUM 40 MILLIGRAM(S): 20 TABLET, DELAYED RELEASE ORAL at 11:38

## 2021-08-31 RX ADMIN — SPIRONOLACTONE 50 MILLIGRAM(S): 25 TABLET, FILM COATED ORAL at 05:09

## 2021-08-31 NOTE — DISCHARGE NOTE PROVIDER - HOSPITAL COURSE
Patient is a 40 y/o male who presents with a chief complaint of fever; echo demonstrates flail posterior leaflet torn chordae and torrential MR.  Pre-op initially for today, but ended up testing positive for COVID, confirmed w/ repeat test.  No cough or congestion.  No sick contacts.  Surgery on hold.  MIDLINE to be placed for IV abx.  Home and come back for surgery in ~10 days     Problem/Plan - 1:  ·  Problem: Mitral valve incompetence.   IV ceftriaxone for ?6 week course likely.    Pt will get MIDLINE today;   He agrees to try monoclonal antibody; ID following and will arrange.  On Torsemide for congestion on CXR.      Home ?Tuesday/Wed pending MAB and MIDLINE placement--> IV abx home infusion set up.      Pt to come back in about 10 days for MVR.   8/31   vss,  monoclonal antibodies   dc home

## 2021-08-31 NOTE — PROGRESS NOTE ADULT - ASSESSMENT
41 year old with a history of cardiac murmur presents with orthopnea, fever and newly diagnosed severe MR with signs of CHF on exam. Now with asymptomatic COVID infection.  Etiology of MR: No sign of epicardial CAD on cath. No definite endocarditis: ESR not elevated much, BLood cultures are negative. Prophylactic IV antibiotics with ceftriaxone till OR when definitive diagnosis can be made.    CHF: improved with diuretics. Continue torsemide and aldactone orally. Monitor K.  MVP/flail leaflet with severe regurgitation: will require repair/replacement, stable CHF now.   OHS postponed till after risk of COVID inflammatory state has passed. Discussed with ID and CTS re: convalescence at home from covid. Outpatient Telehealt to monitor CHF. Pt ordered a pulse oximeter for home use (wife covid + as well) as well as isolation protocol for him.   Agree with MAB to prevent rehospitalization prior to surgery. Pt wishes to proceed.  d/c home therafter.  Outpatient tele-health visit this friday with me.

## 2021-08-31 NOTE — DISCHARGE NOTE NURSING/CASE MANAGEMENT/SOCIAL WORK - NSDCPEFALRISK_GEN_ALL_CORE
For information on Fall & injury Prevention, visit [email or web address]

## 2021-08-31 NOTE — DISCHARGE NOTE PROVIDER - NSDCMRMEDTOKEN_GEN_ALL_CORE_FT
cefTRIAXone: 2 gram(s) intravenous once a day  last dose 10/12  spironolactone 25 mg oral tablet: 2 tab(s) orally 3 times a day   torsemide 20 mg oral tablet: 1 tab(s) orally once a day

## 2021-08-31 NOTE — PROGRESS NOTE ADULT - PROVIDER SPECIALTY LIST ADULT
Critical Care
CT Surgery
Cardiology
Cardiology
Critical Care
Infectious Disease
CT Surgery
Critical Care
Infectious Disease
CT Surgery
Cardiology
Cardiology
Infectious Disease

## 2021-08-31 NOTE — DISCHARGE NOTE NURSING/CASE MANAGEMENT/SOCIAL WORK - PATIENT PORTAL LINK FT
You can access the FollowMyHealth Patient Portal offered by [facility] by registering at the following website: [email or web address]. By joining [employer]’s FollowMyHealth portal, you will also be able to view your health information using other applications (apps) compatible with our system.

## 2021-08-31 NOTE — PROGRESS NOTE ADULT - SUBJECTIVE AND OBJECTIVE BOX
HPI: Feels fine. No dyspnea.    The medical history is unchanged.  Review Of Systems:  No orthopnea or leg edema. The remainder of his ROS is unchanged.    Medications:  cefTRIAXone   IVPB 2000 milliGRAM(s) IV Intermittent every 24 hours  enoxaparin Injectable 60 milliGRAM(s) SubCutaneous every 12 hours  pantoprazole  Injectable 40 milliGRAM(s) IV Push daily  polyethylene glycol 3350 17 Gram(s) Oral daily  sodium chloride 0.9% lock flush 3 milliLiter(s) IV Push every 8 hours  spironolactone 50 milliGRAM(s) Oral <User Schedule>  torsemide 20 milliGRAM(s) Oral daily    Allergies    No Known Allergies    Intolerances        Vitals:  T(F): 98.5 (08-31-21 @ 04:33), Max: 98.8 (08-30-21 @ 20:33)  HR: 86 (08-31-21 @ 04:33) (86 - 96)  BP: 104/70 (08-31-21 @ 04:33) (96/62 - 109/69)  RR: 18 (08-31-21 @ 04:33) (18 - 18)  SpO2: 94% (08-31-21 @ 04:33) (94% - 95%)  Daily     Daily   I&O's Summary    30 Aug 2021 07:01  -  31 Aug 2021 07:00  --------------------------------------------------------  IN: 0 mL / OUT: 2000 mL / NET: -2000 mL        Physical Exam:  Appearance: NAD  HEENT: No icterus or JVD  Cardiovascular: Regular rhythm, normal S1, S2, No murmurs or rubs, No edema  Respiratory: clear to ascultation bilaterally, no crackles or wheeze.  Gastrointestinal: +BS, soft  Musculoskeletal: No clubbing  Neurologic: Non-focal, alert and oriented, Mood & affect appropriate  Lymphatic: No lymphadenopathy  Skin: Warm and moist, no cyanosis                          14.1   7.41  )-----------( 258      ( 31 Aug 2021 05:50 )             41.3     08-31    133<L>  |  95<L>  |  23  ----------------------------<  98  4.1   |  25  |  1.14    Ca    9.4      31 Aug 2021 05:50  Mg     2.5     08-31             Serum Pro-Brain Natriuretic Peptide: 1454 pg/mL (08-26 @ 18:24)

## 2021-08-31 NOTE — DISCHARGE NOTE PROVIDER - CARE PROVIDER_API CALL
Jos Rodrigues)  Surgery; Surgical Critical Care; Thoracic and Cardiac Surgery  [address] [place] [address]  Phone: [phone]  Fax: [phone]  Follow Up Time:     Lisker, Jay J (MD)  Cardiovascular Disease; Internal Medicine  [address]  [place] [address]  Phone: [phone]  Fax: [phone]  Follow Up Time:

## 2021-08-31 NOTE — DISCHARGE NOTE PROVIDER - NSDCCPCAREPLAN_GEN_ALL_CORE_FT
PRINCIPAL DISCHARGE DIAGNOSIS  Diagnosis: Chest pain, unspecified type  Assessment and Plan of Treatment:       SECONDARY DISCHARGE DIAGNOSES  Diagnosis: 2019 novel coronavirus disease (COVID-19)  Assessment and Plan of Treatment:      PRINCIPAL DISCHARGE DIAGNOSIS  Diagnosis: Chest pain, unspecified type  Assessment and Plan of Treatment:       SECONDARY DISCHARGE DIAGNOSES  Diagnosis: 2019 novel coronavirus disease (COVID-19)  Assessment and Plan of Treatment: monoclonal antibodies

## 2021-08-31 NOTE — DISCHARGE NOTE PROVIDER - CARE PROVIDERS DIRECT ADDRESSES
[email or web address]

## 2021-08-31 NOTE — DISCHARGE NOTE PROVIDER - NSDCFUADDAPPT_GEN_ALL_CORE_FT
home care for abx **** call for covid swab appt for sept 9th     [phone]   your surgery is scheduled for sept 10th **** call for covid swab appt for sept 9th     [phone]   your surgery is scheduled for sept 10th     Telehealth visit this friday with Dr Lisker

## 2021-09-01 LAB
MRSA PCR RESULT.: SIGNIFICANT CHANGE UP
S AUREUS DNA NOSE QL NAA+PROBE: SIGNIFICANT CHANGE UP

## 2021-09-03 ENCOUNTER — APPOINTMENT (OUTPATIENT)
Dept: CARDIOLOGY | Facility: CLINIC | Age: 41
End: 2021-09-03
Payer: COMMERCIAL

## 2021-09-03 ENCOUNTER — TRANSCRIPTION ENCOUNTER (OUTPATIENT)
Age: 41
End: 2021-09-03

## 2021-09-03 PROCEDURE — 99213 OFFICE O/P EST LOW 20 MIN: CPT | Mod: 95

## 2021-09-08 ENCOUNTER — APPOINTMENT (OUTPATIENT)
Dept: CARDIOTHORACIC SURGERY | Facility: HOSPITAL | Age: 41
End: 2021-09-08

## 2021-09-08 ENCOUNTER — OUTPATIENT (OUTPATIENT)
Dept: OUTPATIENT SERVICES | Facility: HOSPITAL | Age: 41
LOS: 1 days | End: 2021-09-08
Payer: COMMERCIAL

## 2021-09-08 DIAGNOSIS — Z11.52 ENCOUNTER FOR SCREENING FOR COVID-19: ICD-10-CM

## 2021-09-08 LAB — SARS-COV-2 RNA SPEC QL NAA+PROBE: DETECTED

## 2021-09-08 PROCEDURE — U0003: CPT

## 2021-09-08 PROCEDURE — C9803: CPT

## 2021-09-08 PROCEDURE — U0005: CPT

## 2021-09-08 NOTE — HISTORY OF PRESENT ILLNESS
[FreeTextEntry1] : VIDEO VISIT\par \par TeleHealth Video Encounter:\par  \par Initiated by:\par _x_Patient Call\par __Patient Election for TeleHealth visit\par  \par Consented for TeleHealth visit\par Patient Location:  Home\par Physician Location:\par _x_Office(238; [address], [address], [place], [place], [address])\par __Home\par  \par Narrative:\par During his hospitalization he was noted to have severe mitral regurgitation on repeat echo.  He was treated for congestive heart failure with intravenous then oral diuretics with significant improvement in his symptoms.  His oxygenation improved as well.  Bilateral pleural effusions were noted on admission.\par He was scheduled to undergo open heart surgery mitral valve repair when he tested positive for Covid on a routine screening.  He underwent infusion of monoclonal antibodies given his high risk clinical syndrome.\par He was discharged home and plans to undergo open heart surgery in 10 days.\par Because of the concern for endocarditis until the surgery is performed he was sent home on intravenous ceftriaxone.  (ESR CRP and blood cultures were negative)\par He was discharged home on oral diuretics including torsemide 20 mg daily and spironolactone 150 mg.   (prior potassiums were low)\par  \par Assessment:\par  \par Plan: Reduce spironolactone to 100 mg daily.\par Continue torsemide as is.\par Drink when thirsty.\par Labs with IV home infusion nurse.\par  \par \par Follow-up:\par  \par  \par Duration of Encounter:  _15__ minutes, at least 50% of which was spent in direct counseling and coordination of care\par \par \par

## 2021-09-08 NOTE — HISTORY OF PRESENT ILLNESS
[FreeTextEntry1] : VIDEO VISIT\par \par TeleHealth Video Encounter:\par  \par Initiated by:\par _x_Patient Call\par __Patient Election for TeleHealth visit\par  \par Consented for TeleHealth visit\par Patient Location:  Home\par Physician Location:\par _x_Office(238; [address] [facility], [address], [place], [place], [address])\par __Home\par  \par Narrative:\par During his hospitalization he was noted to have severe mitral regurgitation on repeat echo.  He was treated for congestive heart failure with intravenous then oral diuretics with significant improvement in his symptoms.  His oxygenation improved as well.  Bilateral pleural effusions were noted on admission.\par He was scheduled to undergo open heart surgery mitral valve repair when he tested positive for Covid on a routine screening.  He underwent infusion of monoclonal antibodies given his high risk clinical syndrome.\par He was discharged home and plans to undergo open heart surgery in 10 days.\par Because of the concern for endocarditis until the surgery is performed he was sent home on intravenous ceftriaxone.  (ESR CRP and blood cultures were negative)\par He was discharged home on oral diuretics including torsemide 20 mg daily and spironolactone 150 mg.   (prior potassiums were low)\par  \par Assessment:\par  \par Plan: Reduce spironolactone to 100 mg daily.\par Continue torsemide as is.\par Drink when thirsty.\par Labs with IV home infusion nurse.\par  \par \par Follow-up:\par  \par  \par Duration of Encounter:  _15__ minutes, at least 50% of which was spent in direct counseling and coordination of care\par \par \par

## 2021-09-10 ENCOUNTER — RESULT REVIEW (OUTPATIENT)
Age: 41
End: 2021-09-10

## 2021-09-10 ENCOUNTER — APPOINTMENT (OUTPATIENT)
Dept: CARDIOTHORACIC SURGERY | Facility: HOSPITAL | Age: 41
End: 2021-09-10

## 2021-09-10 ENCOUNTER — INPATIENT (INPATIENT)
Facility: HOSPITAL | Age: 41
LOS: 4 days | Discharge: HOME | DRG: 219 | End: 2021-09-15
Attending: THORACIC SURGERY (CARDIOTHORACIC VASCULAR SURGERY) | Admitting: THORACIC SURGERY (CARDIOTHORACIC VASCULAR SURGERY)
Payer: COMMERCIAL

## 2021-09-10 VITALS — HEIGHT: 77 IN | OXYGEN SATURATION: 98 % | TEMPERATURE: 98 F

## 2021-09-10 DIAGNOSIS — I34.8 OTHER NONRHEUMATIC MITRAL VALVE DISORDERS: ICD-10-CM

## 2021-09-10 LAB
ALBUMIN SERPL ELPH-MCNC: 3.6 G/DL — SIGNIFICANT CHANGE UP (ref 3.3–5)
ALP SERPL-CCNC: 51 U/L — SIGNIFICANT CHANGE UP (ref 40–120)
ALT FLD-CCNC: 13 U/L — SIGNIFICANT CHANGE UP (ref 10–45)
ANION GAP SERPL CALC-SCNC: 18 MMOL/L — HIGH (ref 5–17)
APTT BLD: 28 SEC — SIGNIFICANT CHANGE UP (ref 27.5–35.5)
AST SERPL-CCNC: 35 U/L — SIGNIFICANT CHANGE UP (ref 10–40)
BASE EXCESS BLDV CALC-SCNC: -0.4 MMOL/L — SIGNIFICANT CHANGE UP (ref -2–2)
BASE EXCESS BLDV CALC-SCNC: -2.5 MMOL/L — LOW (ref -2–2)
BASE EXCESS BLDV CALC-SCNC: 0 MMOL/L — SIGNIFICANT CHANGE UP (ref -2–2)
BASOPHILS # BLD AUTO: 0 K/UL — SIGNIFICANT CHANGE UP (ref 0–0.2)
BASOPHILS NFR BLD AUTO: 0 % — SIGNIFICANT CHANGE UP (ref 0–2)
BILIRUB SERPL-MCNC: 0.5 MG/DL — SIGNIFICANT CHANGE UP (ref 0.2–1.2)
BLD GP AB SCN SERPL QL: NEGATIVE — SIGNIFICANT CHANGE UP
BLOOD GAS VENOUS - CREATININE: SIGNIFICANT CHANGE UP MG/DL (ref 0.5–1.3)
BUN SERPL-MCNC: 20 MG/DL — SIGNIFICANT CHANGE UP (ref 7–23)
CA-I SERPL-SCNC: 0.87 MMOL/L — LOW (ref 1.15–1.33)
CA-I SERPL-SCNC: 0.89 MMOL/L — LOW (ref 1.15–1.33)
CA-I SERPL-SCNC: 0.91 MMOL/L — LOW (ref 1.15–1.33)
CALCIUM SERPL-MCNC: 8.7 MG/DL — SIGNIFICANT CHANGE UP (ref 8.4–10.5)
CHLORIDE BLDV-SCNC: 100 MMOL/L — SIGNIFICANT CHANGE UP (ref 96–108)
CHLORIDE BLDV-SCNC: 101 MMOL/L — SIGNIFICANT CHANGE UP (ref 96–108)
CHLORIDE BLDV-SCNC: 101 MMOL/L — SIGNIFICANT CHANGE UP (ref 96–108)
CHLORIDE SERPL-SCNC: 101 MMOL/L — SIGNIFICANT CHANGE UP (ref 96–108)
CK MB BLD-MCNC: 10 % — HIGH (ref 0–3.5)
CK MB CFR SERPL CALC: 30.3 NG/ML — HIGH (ref 0–6.7)
CK SERPL-CCNC: 303 U/L — HIGH (ref 30–200)
CO2 BLDV-SCNC: 24 MMOL/L — SIGNIFICANT CHANGE UP (ref 22–26)
CO2 BLDV-SCNC: 26 MMOL/L — SIGNIFICANT CHANGE UP (ref 22–26)
CO2 BLDV-SCNC: 28 MMOL/L — HIGH (ref 22–26)
CO2 SERPL-SCNC: 20 MMOL/L — LOW (ref 22–31)
CREAT SERPL-MCNC: 0.92 MG/DL — SIGNIFICANT CHANGE UP (ref 0.5–1.3)
EOSINOPHIL # BLD AUTO: 0 K/UL — SIGNIFICANT CHANGE UP (ref 0–0.5)
EOSINOPHIL NFR BLD AUTO: 0 % — SIGNIFICANT CHANGE UP (ref 0–6)
FIBRINOGEN PPP-MCNC: 418 MG/DL — SIGNIFICANT CHANGE UP (ref 290–520)
GAS PNL BLDA: SIGNIFICANT CHANGE UP
GAS PNL BLDV: 133 MMOL/L — LOW (ref 136–145)
GAS PNL BLDV: 135 MMOL/L — LOW (ref 136–145)
GAS PNL BLDV: 135 MMOL/L — LOW (ref 136–145)
GAS PNL BLDV: SIGNIFICANT CHANGE UP
GLUCOSE BLDV-MCNC: 176 MG/DL — HIGH (ref 70–99)
GLUCOSE BLDV-MCNC: 180 MG/DL — HIGH (ref 70–99)
GLUCOSE BLDV-MCNC: 189 MG/DL — HIGH (ref 70–99)
GLUCOSE SERPL-MCNC: 131 MG/DL — HIGH (ref 70–99)
HCO3 BLDV-SCNC: 23 MMOL/L — SIGNIFICANT CHANGE UP (ref 22–29)
HCO3 BLDV-SCNC: 25 MMOL/L — SIGNIFICANT CHANGE UP (ref 22–29)
HCO3 BLDV-SCNC: 26 MMOL/L — SIGNIFICANT CHANGE UP (ref 22–29)
HCT VFR BLD CALC: 35.8 % — LOW (ref 39–50)
HCT VFR BLDA CALC: 31 % — LOW (ref 39–51)
HCT VFR BLDA CALC: 31 % — LOW (ref 39–51)
HCT VFR BLDA CALC: 32 % — LOW (ref 39–51)
HGB BLD CALC-MCNC: 10.4 G/DL — LOW (ref 12.6–17.4)
HGB BLD CALC-MCNC: 10.4 G/DL — LOW (ref 12.6–17.4)
HGB BLD CALC-MCNC: 10.6 G/DL — LOW (ref 12.6–17.4)
HGB BLD-MCNC: 12.1 G/DL — LOW (ref 13–17)
INR BLD: 1.1 RATIO — SIGNIFICANT CHANGE UP (ref 0.88–1.16)
LACTATE BLDV-MCNC: 2.4 MMOL/L — HIGH (ref 0.7–2)
LACTATE BLDV-MCNC: 2.4 MMOL/L — HIGH (ref 0.7–2)
LACTATE BLDV-MCNC: 3.1 MMOL/L — HIGH (ref 0.7–2)
LYMPHOCYTES # BLD AUTO: 1.06 K/UL — SIGNIFICANT CHANGE UP (ref 1–3.3)
LYMPHOCYTES # BLD AUTO: 5 % — LOW (ref 13–44)
MANUAL SMEAR VERIFICATION: SIGNIFICANT CHANGE UP
MCHC RBC-ENTMCNC: 29.4 PG — SIGNIFICANT CHANGE UP (ref 27–34)
MCHC RBC-ENTMCNC: 33.8 GM/DL — SIGNIFICANT CHANGE UP (ref 32–36)
MCV RBC AUTO: 87.1 FL — SIGNIFICANT CHANGE UP (ref 80–100)
MONOCYTES # BLD AUTO: 1.27 K/UL — HIGH (ref 0–0.9)
MONOCYTES NFR BLD AUTO: 6 % — SIGNIFICANT CHANGE UP (ref 2–14)
NEUTROPHILS # BLD AUTO: 18.89 K/UL — HIGH (ref 1.8–7.4)
NEUTROPHILS NFR BLD AUTO: 85 % — HIGH (ref 43–77)
NEUTS BAND # BLD: 4 % — SIGNIFICANT CHANGE UP (ref 0–8)
NRBC # BLD: 0 /100 — SIGNIFICANT CHANGE UP (ref 0–0)
PCO2 BLDV: 42 MMHG — SIGNIFICANT CHANGE UP (ref 42–55)
PCO2 BLDV: 43 MMHG — SIGNIFICANT CHANGE UP (ref 42–55)
PCO2 BLDV: 50 MMHG — SIGNIFICANT CHANGE UP (ref 42–55)
PH BLDV: 7.33 — SIGNIFICANT CHANGE UP (ref 7.32–7.43)
PH BLDV: 7.34 — SIGNIFICANT CHANGE UP (ref 7.32–7.43)
PH BLDV: 7.38 — SIGNIFICANT CHANGE UP (ref 7.32–7.43)
PLAT MORPH BLD: NORMAL — SIGNIFICANT CHANGE UP
PLATELET # BLD AUTO: 192 K/UL — SIGNIFICANT CHANGE UP (ref 150–400)
PO2 BLDV: 108 MMHG — HIGH (ref 25–45)
PO2 BLDV: 112 MMHG — HIGH (ref 25–45)
PO2 BLDV: 57 MMHG — HIGH (ref 25–45)
POTASSIUM BLDV-SCNC: 5 MMOL/L — SIGNIFICANT CHANGE UP (ref 3.5–5.1)
POTASSIUM BLDV-SCNC: 5.5 MMOL/L — HIGH (ref 3.5–5.1)
POTASSIUM BLDV-SCNC: 6.2 MMOL/L — CRITICAL HIGH (ref 3.5–5.1)
POTASSIUM SERPL-MCNC: 4.5 MMOL/L — SIGNIFICANT CHANGE UP (ref 3.5–5.3)
POTASSIUM SERPL-SCNC: 4.5 MMOL/L — SIGNIFICANT CHANGE UP (ref 3.5–5.3)
PROT SERPL-MCNC: 5.6 G/DL — LOW (ref 6–8.3)
PROTHROM AB SERPL-ACNC: 13.1 SEC — SIGNIFICANT CHANGE UP (ref 10.6–13.6)
RBC # BLD: 4.11 M/UL — LOW (ref 4.2–5.8)
RBC # FLD: 11.5 % — SIGNIFICANT CHANGE UP (ref 10.3–14.5)
RBC BLD AUTO: SIGNIFICANT CHANGE UP
RH IG SCN BLD-IMP: POSITIVE — SIGNIFICANT CHANGE UP
SAO2 % BLDV: 92.7 % — HIGH (ref 67–88)
SAO2 % BLDV: 99.3 % — HIGH (ref 67–88)
SAO2 % BLDV: 99.6 % — HIGH (ref 67–88)
SODIUM SERPL-SCNC: 139 MMOL/L — SIGNIFICANT CHANGE UP (ref 135–145)
TROPONIN T, HIGH SENSITIVITY RESULT: 321 NG/L — HIGH (ref 0–51)
WBC # BLD: 21.23 K/UL — HIGH (ref 3.8–10.5)
WBC # FLD AUTO: 21.23 K/UL — HIGH (ref 3.8–10.5)

## 2021-09-10 PROCEDURE — 33427 REPAIR OF MITRAL VALVE: CPT

## 2021-09-10 PROCEDURE — 71045 X-RAY EXAM CHEST 1 VIEW: CPT | Mod: 26

## 2021-09-10 PROCEDURE — 93010 ELECTROCARDIOGRAM REPORT: CPT

## 2021-09-10 PROCEDURE — 99291 CRITICAL CARE FIRST HOUR: CPT

## 2021-09-10 PROCEDURE — 99292 CRITICAL CARE ADDL 30 MIN: CPT

## 2021-09-10 PROCEDURE — 88305 TISSUE EXAM BY PATHOLOGIST: CPT | Mod: 26

## 2021-09-10 RX ORDER — CEFAZOLIN SODIUM 1 G
2000 VIAL (EA) INJECTION ONCE
Refills: 0 | Status: DISCONTINUED | OUTPATIENT
Start: 2021-09-10 | End: 2021-09-10

## 2021-09-10 RX ORDER — POLYETHYLENE GLYCOL 3350 17 G/17G
17 POWDER, FOR SOLUTION ORAL DAILY
Refills: 0 | Status: DISCONTINUED | OUTPATIENT
Start: 2021-09-10 | End: 2021-09-15

## 2021-09-10 RX ORDER — METOCLOPRAMIDE HCL 10 MG
10 TABLET ORAL EVERY 8 HOURS
Refills: 0 | Status: COMPLETED | OUTPATIENT
Start: 2021-09-10 | End: 2021-09-12

## 2021-09-10 RX ORDER — INSULIN HUMAN 100 [IU]/ML
3 INJECTION, SOLUTION SUBCUTANEOUS
Qty: 100 | Refills: 0 | Status: DISCONTINUED | OUTPATIENT
Start: 2021-09-10 | End: 2021-09-11

## 2021-09-10 RX ORDER — POTASSIUM CHLORIDE 20 MEQ
10 PACKET (EA) ORAL
Refills: 0 | Status: DISCONTINUED | OUTPATIENT
Start: 2021-09-10 | End: 2021-09-11

## 2021-09-10 RX ORDER — ALBUMIN HUMAN 25 %
250 VIAL (ML) INTRAVENOUS ONCE
Refills: 0 | Status: COMPLETED | OUTPATIENT
Start: 2021-09-10 | End: 2021-09-10

## 2021-09-10 RX ORDER — ONDANSETRON 8 MG/1
4 TABLET, FILM COATED ORAL ONCE
Refills: 0 | Status: COMPLETED | OUTPATIENT
Start: 2021-09-10 | End: 2021-09-10

## 2021-09-10 RX ORDER — CEFUROXIME AXETIL 250 MG
1500 TABLET ORAL EVERY 8 HOURS
Refills: 0 | Status: COMPLETED | OUTPATIENT
Start: 2021-09-10 | End: 2021-09-12

## 2021-09-10 RX ORDER — HYDROMORPHONE HYDROCHLORIDE 2 MG/ML
0.5 INJECTION INTRAMUSCULAR; INTRAVENOUS; SUBCUTANEOUS ONCE
Refills: 0 | Status: DISCONTINUED | OUTPATIENT
Start: 2021-09-10 | End: 2021-09-10

## 2021-09-10 RX ORDER — VASOPRESSIN 20 [USP'U]/ML
0.06 INJECTION INTRAVENOUS
Qty: 50 | Refills: 0 | Status: DISCONTINUED | OUTPATIENT
Start: 2021-09-10 | End: 2021-09-12

## 2021-09-10 RX ORDER — KETOROLAC TROMETHAMINE 30 MG/ML
15 SYRINGE (ML) INJECTION ONCE
Refills: 0 | Status: DISCONTINUED | OUTPATIENT
Start: 2021-09-10 | End: 2021-09-10

## 2021-09-10 RX ORDER — DEXTROSE 50 % IN WATER 50 %
25 SYRINGE (ML) INTRAVENOUS
Refills: 0 | Status: DISCONTINUED | OUTPATIENT
Start: 2021-09-10 | End: 2021-09-11

## 2021-09-10 RX ORDER — SODIUM CHLORIDE 9 MG/ML
500 INJECTION, SOLUTION INTRAVENOUS ONCE
Refills: 0 | Status: COMPLETED | OUTPATIENT
Start: 2021-09-10 | End: 2021-09-10

## 2021-09-10 RX ORDER — DEXTROSE 50 % IN WATER 50 %
50 SYRINGE (ML) INTRAVENOUS
Refills: 0 | Status: DISCONTINUED | OUTPATIENT
Start: 2021-09-10 | End: 2021-09-11

## 2021-09-10 RX ORDER — SODIUM CHLORIDE 9 MG/ML
1000 INJECTION INTRAMUSCULAR; INTRAVENOUS; SUBCUTANEOUS
Refills: 0 | Status: DISCONTINUED | OUTPATIENT
Start: 2021-09-10 | End: 2021-09-12

## 2021-09-10 RX ORDER — ALBUMIN HUMAN 25 %
250 VIAL (ML) INTRAVENOUS ONCE
Refills: 0 | Status: DISCONTINUED | OUTPATIENT
Start: 2021-09-10 | End: 2021-09-13

## 2021-09-10 RX ORDER — CALCIUM GLUCONATE 100 MG/ML
1 VIAL (ML) INTRAVENOUS ONCE
Refills: 0 | Status: COMPLETED | OUTPATIENT
Start: 2021-09-10 | End: 2021-09-10

## 2021-09-10 RX ORDER — DEXMEDETOMIDINE HYDROCHLORIDE IN 0.9% SODIUM CHLORIDE 4 UG/ML
0.4 INJECTION INTRAVENOUS
Qty: 200 | Refills: 0 | Status: DISCONTINUED | OUTPATIENT
Start: 2021-09-10 | End: 2021-09-10

## 2021-09-10 RX ORDER — ACETAMINOPHEN 500 MG
1000 TABLET ORAL ONCE
Refills: 0 | Status: COMPLETED | OUTPATIENT
Start: 2021-09-10 | End: 2021-09-10

## 2021-09-10 RX ORDER — NOREPINEPHRINE BITARTRATE/D5W 8 MG/250ML
0.07 PLASTIC BAG, INJECTION (ML) INTRAVENOUS
Qty: 8 | Refills: 0 | Status: DISCONTINUED | OUTPATIENT
Start: 2021-09-10 | End: 2021-09-11

## 2021-09-10 RX ORDER — PANTOPRAZOLE SODIUM 20 MG/1
40 TABLET, DELAYED RELEASE ORAL DAILY
Refills: 0 | Status: DISCONTINUED | OUTPATIENT
Start: 2021-09-10 | End: 2021-09-11

## 2021-09-10 RX ORDER — CHLORHEXIDINE GLUCONATE 213 G/1000ML
1 SOLUTION TOPICAL
Refills: 0 | Status: DISCONTINUED | OUTPATIENT
Start: 2021-09-10 | End: 2021-09-12

## 2021-09-10 RX ORDER — SODIUM CHLORIDE 9 MG/ML
1000 INJECTION, SOLUTION INTRAVENOUS ONCE
Refills: 0 | Status: COMPLETED | OUTPATIENT
Start: 2021-09-10 | End: 2021-09-10

## 2021-09-10 RX ORDER — CHLORHEXIDINE GLUCONATE 213 G/1000ML
15 SOLUTION TOPICAL EVERY 12 HOURS
Refills: 0 | Status: DISCONTINUED | OUTPATIENT
Start: 2021-09-10 | End: 2021-09-10

## 2021-09-10 RX ORDER — SODIUM CHLORIDE 9 MG/ML
3 INJECTION INTRAMUSCULAR; INTRAVENOUS; SUBCUTANEOUS EVERY 8 HOURS
Refills: 0 | Status: DISCONTINUED | OUTPATIENT
Start: 2021-09-10 | End: 2021-09-10

## 2021-09-10 RX ORDER — LIDOCAINE HCL 20 MG/ML
5 VIAL (ML) INJECTION ONCE
Refills: 0 | Status: DISCONTINUED | OUTPATIENT
Start: 2021-09-10 | End: 2021-09-10

## 2021-09-10 RX ADMIN — Medication 15 MILLIGRAM(S): at 22:56

## 2021-09-10 RX ADMIN — DEXMEDETOMIDINE HYDROCHLORIDE IN 0.9% SODIUM CHLORIDE 7.7 MICROGRAM(S)/KG/HR: 4 INJECTION INTRAVENOUS at 14:12

## 2021-09-10 RX ADMIN — Medication 10.1 MICROGRAM(S)/KG/MIN: at 14:12

## 2021-09-10 RX ADMIN — Medication 62.5 MILLIMOLE(S): at 15:18

## 2021-09-10 RX ADMIN — PANTOPRAZOLE SODIUM 40 MILLIGRAM(S): 20 TABLET, DELAYED RELEASE ORAL at 21:39

## 2021-09-10 RX ADMIN — Medication 100 MILLIGRAM(S): at 23:01

## 2021-09-10 RX ADMIN — Medication 10 MILLIGRAM(S): at 21:17

## 2021-09-10 RX ADMIN — INSULIN HUMAN 3 UNIT(S)/HR: 100 INJECTION, SOLUTION SUBCUTANEOUS at 15:18

## 2021-09-10 RX ADMIN — VASOPRESSIN 3.6 UNIT(S)/MIN: 20 INJECTION INTRAVENOUS at 14:11

## 2021-09-10 RX ADMIN — HYDROMORPHONE HYDROCHLORIDE 0.5 MILLIGRAM(S): 2 INJECTION INTRAMUSCULAR; INTRAVENOUS; SUBCUTANEOUS at 14:21

## 2021-09-10 RX ADMIN — Medication 100 GRAM(S): at 13:16

## 2021-09-10 RX ADMIN — ONDANSETRON 4 MILLIGRAM(S): 8 TABLET, FILM COATED ORAL at 17:45

## 2021-09-10 RX ADMIN — SODIUM CHLORIDE 3000 MILLILITER(S): 9 INJECTION, SOLUTION INTRAVENOUS at 15:19

## 2021-09-10 RX ADMIN — Medication 400 MILLIGRAM(S): at 17:15

## 2021-09-10 RX ADMIN — Medication 15 MILLIGRAM(S): at 14:40

## 2021-09-10 RX ADMIN — Medication 125 MILLILITER(S): at 17:17

## 2021-09-10 RX ADMIN — HYDROMORPHONE HYDROCHLORIDE 0.5 MILLIGRAM(S): 2 INJECTION INTRAMUSCULAR; INTRAVENOUS; SUBCUTANEOUS at 13:51

## 2021-09-10 RX ADMIN — Medication 1000 MILLIGRAM(S): at 17:45

## 2021-09-10 RX ADMIN — Medication 15 MILLIGRAM(S): at 22:26

## 2021-09-10 RX ADMIN — Medication 100 MILLIGRAM(S): at 16:33

## 2021-09-10 RX ADMIN — Medication 125 MILLILITER(S): at 15:19

## 2021-09-10 RX ADMIN — Medication 15 MILLIGRAM(S): at 14:10

## 2021-09-10 RX ADMIN — SODIUM CHLORIDE 1000 MILLILITER(S): 9 INJECTION, SOLUTION INTRAVENOUS at 15:19

## 2021-09-10 NOTE — PATIENT PROFILE ADULT - NSTOBACCONEVERSMOKERY/N_GEN_A
Cough: Care Instructions  Your Care Instructions    A cough is your body's response to something that bothers your throat or airways. Many things can cause a cough. You might cough because of a cold or the flu, bronchitis, or asthma. Smoking, postnasal drip, allergies, and stomach acid that backs up into your throat also can cause coughs. A cough is a symptom, not a disease. Most coughs stop when the cause, such as a cold, goes away. You can take a few steps at home to cough less and feel better. Follow-up care is a key part of your treatment and safety. Be sure to make and go to all appointments, and call your doctor if you are having problems. It's also a good idea to know your test results and keep a list of the medicines you take. How can you care for yourself at home? · Drink lots of water and other fluids. This helps thin the mucus and soothes a dry or sore throat. Honey or lemon juice in hot water or tea may ease a dry cough. · Take cough medicine as directed by your doctor. · Prop up your head on pillows to help you breathe and ease a dry cough. · Try cough drops to soothe a dry or sore throat. Cough drops don't stop a cough. Medicine-flavored cough drops are no better than candy-flavored drops or hard candy. · Do not smoke. Avoid secondhand smoke. If you need help quitting, talk to your doctor about stop-smoking programs and medicines. These can increase your chances of quitting for good. When should you call for help? Call 911 anytime you think you may need emergency care.  For example, call if:    · You have severe trouble breathing.    Call your doctor now or seek immediate medical care if:    · You cough up blood.     · You have new or worse trouble breathing.     · You have a new or higher fever.     · You have a new rash.    Watch closely for changes in your health, and be sure to contact your doctor if:    · You cough more deeply or more often, especially if you notice more mucus or a change in the color of your mucus.     · You have new symptoms, such as a sore throat, an earache, or sinus pain.     · You do not get better as expected. Where can you learn more? Go to http://yoly-jackie.info/. Enter D279 in the search box to learn more about \"Cough: Care Instructions. \"  Current as of: September 5, 2018  Content Version: 11.9  © 4445-2486 [employer]. Care instructions adapted under license by [employer] (which disclaims liability or warranty for this information). If you have questions about a medical condition or this instruction, always ask your healthcare professional. [address] any warranty or liability for your use of this information. No

## 2021-09-10 NOTE — PROGRESS NOTE ADULT - SUBJECTIVE AND OBJECTIVE BOX
MAX BOWER  MRN-[id number]  Patient is a 41y old  Male who presents with a chief complaint of   HPI:      Surgery/Hospital course:    Today:    REVIEW OF SYSTEMS:  Gen: No fever  EYES/ENT: No visual changes;  No vertigo or throat pain   NECK: No pain   RES:  No shortness of breath or Cough  Chest: + incisional pain  CARD: No chest pain   GI: No abdominal pain  : No dysuria  NEURO: No weakness  SKIN: No itching, rashes     Physical Exam:  Vital Signs Last 24 Hrs  T(C): 37.7 (10 Sep 2021 22:00), Max: 37.7 (10 Sep 2021 20:00)  T(F): 99.9 (10 Sep 2021 22:00), Max: 99.9 (10 Sep 2021 20:00)  HR: 89 (10 Sep 2021 23:00) (86 - 108)  BP: 113/68 (10 Sep 2021 23:00) (96/58 - 113/68)  BP(mean): 88 (10 Sep 2021 23:00) (71 - 88)  RR: 13 (10 Sep 2021 23:00) (5 - 22)  SpO2: 100% (10 Sep 2021 23:00) (98% - 100%)  Gen:  Awake, alert   CNS: non focal 	  Neck: no JVD  RES : clear , no wheezing    Chest:   + chest tubes                     CVS: Regular  rhythm. Normal S1/S2  Abd: Soft, non-distended. Bowel sounds present.  Skin: No rash.  Ext:  no edema, A Line  ============================I/O===========================   I&O's Detail    10 Sep 2021 07:01  -  10 Sep 2021 23:37  --------------------------------------------------------  IN:    Albumin 5%  - 250 mL: 1000 mL    Dexmedetomidine: 22.8 mL    Insulin: 28 mL    IV PiggyBack: 200 mL    IV PiggyBack: 250 mL    Lactated Ringers Bolus: 1500 mL    Norepinephrine: 62.2 mL    Oral Fluid: 240 mL    sodium chloride 0.9%: 130 mL    Vasopressin: 70.8 mL  Total IN: 3503.8 mL    OUT:    Chest Tube (mL): 215 mL    Indwelling Catheter - Urethral (mL): 770 mL    VAC (Vacuum Assisted Closure) System (mL): 77 mL  Total OUT: 1062 mL    Total NET: 2441.8 mL        ============================ LABS =========================                        12.1   21.23 )-----------( 192      ( 10 Sep 2021 13:07 )             35.8     09-10    139  |  101  |  20  ----------------------------<  131<H>  4.5   |  20<L>  |  0.92    Ca    8.7      10 Sep 2021 13:07  Phos  2.4     09-10  Mg     3.2     09-10    TPro  5.6<L>  /  Alb  3.6  /  TBili  0.5  /  DBili  x   /  AST  35  /  ALT  13  /  AlkPhos  51  09-10    LIVER FUNCTIONS - ( 10 Sep 2021 13:07 )  Alb: 3.6 g/dL / Pro: 5.6 g/dL / ALK PHOS: 51 U/L / ALT: 13 U/L / AST: 35 U/L / GGT: x           PT/INR - ( 10 Sep 2021 13:08 )   PT: 13.1 sec;   INR: 1.10 ratio         PTT - ( 10 Sep 2021 13:08 )  PTT:28.0 sec  ABG - ( 10 Sep 2021 20:41 )  pH, Arterial: 7.37  pH, Blood: x     /  pCO2: 47    /  pO2: 155   / HCO3: 27    / Base Excess: 1.5   /  SaO2: 99.5                ======================Micro/Rad/Cardio=================  Culture: Reviewed   CXR: Reviewed  Echo:Reviewed  ======================================================  PAST MEDICAL & SURGICAL HISTORY:  No pertinent past medical history    No significant past surgical history      ====================ASSESSMENT ==============  CNS:  RES:  CVS:  Hem:  Checo:  GI:  Endo:  ID:  Skin  Plan:  ====================== NEUROLOGY=====================  metoclopramide Injectable 10 milliGRAM(s) IV Push every 8 hours    ==================== RESPIRATORY======================  Mechanical Ventilation:  Mode: CPAP with PS  FiO2: 40  PEEP: 5  PS: 5  MAP: 8  PIP: 12      ====================CARDIOVASCULAR==================  norepinephrine Infusion 0.07 MICROgram(s)/kG/Min (10.1 mL/Hr) IV Continuous <Continuous>    ===================HEMATOLOGIC/ONC ===================    ===================== RENAL =========================  Foote for monitoring urine output    ==================== GASTROINTESTINAL===================  albumin human  5% IVPB 250 milliLiter(s) IV Intermittent once  pantoprazole  Injectable 40 milliGRAM(s) IV Push daily  polyethylene glycol 3350 17 Gram(s) Oral daily  potassium chloride  10 mEq/50 mL IVPB 10 milliEquivalent(s) IV Intermittent every 1 hour  potassium chloride  10 mEq/50 mL IVPB 10 milliEquivalent(s) IV Intermittent every 1 hour  potassium chloride  10 mEq/50 mL IVPB 10 milliEquivalent(s) IV Intermittent every 1 hour  sodium chloride 0.9%. 1000 milliLiter(s) (10 mL/Hr) IV Continuous <Continuous>    =======================    ENDOCRINE  =====================  dextrose 50% Injectable 50 milliLiter(s) IV Push every 15 minutes  dextrose 50% Injectable 25 milliLiter(s) IV Push every 15 minutes  insulin regular Infusion 3 Unit(s)/Hr (3 mL/Hr) IV Continuous <Continuous>  vasopressin Infusion 0.06 Unit(s)/Min (3.6 mL/Hr) IV Continuous <Continuous>    ========================INFECTIOUS DISEASE================  cefuroxime  IVPB 1500 milliGRAM(s) IV Intermittent every 8 hours      .crit    By signing my name below, I, Alena Chau, attest that this documentation has been prepared under the direction and in the presence of Niranjan Cedeño MD.  Electronically signed: Jarrod Savage, 09-10-21 @ 23:37    I, Niranjan Cedeño, personally performed the services described in this documentation. all medical record entries made by the scribe were at my direction and in my presence. I have reviewed the chart and agree that the record reflects my personal performance and is accurate and complete  Electronically signed: Niranjan Cedeño, 09-10-21 @ 23:37       MAX BOWER  MRN-[id number]  Patient is a 41y old  Male who presents with a chief complaint of   HPI:      Surgery/Hospital course:  9/10 MV reconstruction (valvuloplasty + annuloplasty), AMBROCIO clip     Today:    REVIEW OF SYSTEMS:  Gen: No fever  EYES/ENT: No visual changes;  No vertigo or throat pain   NECK: No pain   RES:  No shortness of breath or Cough  Chest: + incisional pain  CARD: No chest pain   GI: No abdominal pain  : No dysuria  NEURO: No weakness  SKIN: No itching, rashes     Physical Exam:  Vital Signs Last 24 Hrs  T(C): 37.7 (10 Sep 2021 22:00), Max: 37.7 (10 Sep 2021 20:00)  T(F): 99.9 (10 Sep 2021 22:00), Max: 99.9 (10 Sep 2021 20:00)  HR: 89 (10 Sep 2021 23:00) (86 - 108)  BP: 113/68 (10 Sep 2021 23:00) (96/58 - 113/68)  BP(mean): 88 (10 Sep 2021 23:00) (71 - 88)  RR: 13 (10 Sep 2021 23:00) (5 - 22)  SpO2: 100% (10 Sep 2021 23:00) (98% - 100%)  Gen:  Awake, alert   CNS: non focal 	  Neck: no JVD  RES : clear , no wheezing    Chest:   + chest tubes                     CVS: Regular  rhythm. Normal S1/S2  Abd: Soft, non-distended. Bowel sounds present.  Skin: No rash.  Ext:  no edema, A Line  ============================I/O===========================   I&O's Detail    10 Sep 2021 07:01  -  10 Sep 2021 23:37  --------------------------------------------------------  IN:    Albumin 5%  - 250 mL: 1000 mL    Dexmedetomidine: 22.8 mL    Insulin: 28 mL    IV PiggyBack: 200 mL    IV PiggyBack: 250 mL    Lactated Ringers Bolus: 1500 mL    Norepinephrine: 62.2 mL    Oral Fluid: 240 mL    sodium chloride 0.9%: 130 mL    Vasopressin: 70.8 mL  Total IN: 3503.8 mL    OUT:    Chest Tube (mL): 215 mL    Indwelling Catheter - Urethral (mL): 770 mL    VAC (Vacuum Assisted Closure) System (mL): 77 mL  Total OUT: 1062 mL    Total NET: 2441.8 mL        ============================ LABS =========================                        12.1   21.23 )-----------( 192      ( 10 Sep 2021 13:07 )             35.8     09-10    139  |  101  |  20  ----------------------------<  131<H>  4.5   |  20<L>  |  0.92    Ca    8.7      10 Sep 2021 13:07  Phos  2.4     09-10  Mg     3.2     09-10    TPro  5.6<L>  /  Alb  3.6  /  TBili  0.5  /  DBili  x   /  AST  35  /  ALT  13  /  AlkPhos  51  09-10    LIVER FUNCTIONS - ( 10 Sep 2021 13:07 )  Alb: 3.6 g/dL / Pro: 5.6 g/dL / ALK PHOS: 51 U/L / ALT: 13 U/L / AST: 35 U/L / GGT: x           PT/INR - ( 10 Sep 2021 13:08 )   PT: 13.1 sec;   INR: 1.10 ratio         PTT - ( 10 Sep 2021 13:08 )  PTT:28.0 sec  ABG - ( 10 Sep 2021 20:41 )  pH, Arterial: 7.37  pH, Blood: x     /  pCO2: 47    /  pO2: 155   / HCO3: 27    / Base Excess: 1.5   /  SaO2: 99.5                ======================Micro/Rad/Cardio=================  CXR: Reviewed  Echo:Reviewed  ======================================================  PAST MEDICAL & SURGICAL HISTORY:  No pertinent past medical history    No significant past surgical history      ====================ASSESSMENT ==============  This is  a well groomed well developed 40y/o  male in general good health. Developed fever t max 104 x 48hrs presented to Urgent care on cardiac exam found to have + Murmur.    MV reconstruction (valvuloplasty + annuloplasty)  AMBROCIO clip   stress hyperglycemia    Plan:  ====================== NEUROLOGY=====================  Continue close monitoring of neuro status     ==================== RESPIRATORY======================  Stable on RA, SpO2 98% - 100%  Encourage incentive spirometry, continue pulse ox monitoring, follow ABGs     ====================CARDIOVASCULAR==================  Developed fever t max 104 x 48hrs presented to Urgent care on cardiac exam found to have + Murmur.    MV reconstruction (valvuloplasty + annuloplasty)  AMBROCIO clip   IV levophed vasopressin for pressor support     norepinephrine Infusion 0.07 MICROgram(s)/kG/Min (10.1 mL/Hr) IV Continuous <Continuous>  vasopressin Infusion 0.06 Unit(s)/Min (3.6 mL/Hr) IV Continuous <Continuous>  ===================HEMATOLOGIC/ONC ===================    ===================== RENAL =========================  Foote for monitoring urine output  Replete lytes PRN. Keep K> 4 and Mg >2     ==================== GASTROINTESTINAL===================  Tolerating a clear liquid diet   Bowel regimen with Miralax   Reglan for gut motility     albumin human  5% IVPB 250 milliLiter(s) IV Intermittent once  GI prophylaxis, pantoprazole  Injectable 40 milliGRAM(s) IV Push daily  polyethylene glycol 3350 17 Gram(s) Oral daily  potassium chloride  10 mEq/50 mL IVPB 10 milliEquivalent(s) IV Intermittent every 1 hour  potassium chloride  10 mEq/50 mL IVPB 10 milliEquivalent(s) IV Intermittent every 1 hour  potassium chloride  10 mEq/50 mL IVPB 10 milliEquivalent(s) IV Intermittent every 1 hour  sodium chloride 0.9%. 1000 milliLiter(s) (10 mL/Hr) IV Continuous <Continuous>  metoclopramide Injectable 10 milliGRAM(s) IV Push every 8 hours  =======================    ENDOCRINE  =====================  stress hyperglycemia requiring glucose with insulin gtt, titrate as per insulin protocol along with hourly glucose checks      dextrose 50% Injectable 50 milliLiter(s) IV Push every 15 minutes  dextrose 50% Injectable 25 milliLiter(s) IV Push every 15 minutes  insulin regular Infusion 3 Unit(s)/Hr (3 mL/Hr) IV Continuous <Continuous>      ========================INFECTIOUS DISEASE================  Temp 99F, WBC high 21.23  Continue trending WBC and monitoring fever curve   Perioperative coverage with Cefuroxime     cefuroxime  IVPB 1500 milliGRAM(s) IV Intermittent every 8 hours        By signing my name below, Alena LONG, attest that this documentation has been prepared under the direction and in the presence of Niranjan Cedeño MD.  Electronically signed: Jarrod Savage, 09-10-21 @ 23:37    Niranjan LONGh, personally performed the services described in this documentation. all medical record entries made by the scribe were at my direction and in my presence. I have reviewed the chart and agree that the record reflects my personal performance and is accurate and complete  Electronically signed: Niranjan Cedeño, 09-10-21 @ 23:37       MAX BOWER  MRN-[id number]  Patient is a 41y old  Male who presents with a chief complaint of   HPI:      Surgery/Hospital course:  9/10 MV reconstruction (valvuloplasty + annuloplasty), AMBROCIO clip     Today:    REVIEW OF SYSTEMS:  Gen: No fever  EYES/ENT: No visual changes;  No vertigo or throat pain   NECK: No pain   RES:  No shortness of breath or Cough  Chest: + incisional pain  CARD: No chest pain   GI: No abdominal pain  : No dysuria  NEURO: No weakness  SKIN: No itching, rashes     Physical Exam:  Vital Signs Last 24 Hrs  T(C): 37.7 (10 Sep 2021 22:00), Max: 37.7 (10 Sep 2021 20:00)  T(F): 99.9 (10 Sep 2021 22:00), Max: 99.9 (10 Sep 2021 20:00)  HR: 89 (10 Sep 2021 23:00) (86 - 108)  BP: 113/68 (10 Sep 2021 23:00) (96/58 - 113/68)  BP(mean): 88 (10 Sep 2021 23:00) (71 - 88)  RR: 13 (10 Sep 2021 23:00) (5 - 22)  SpO2: 100% (10 Sep 2021 23:00) (98% - 100%)  Gen:  Awake, alert   CNS: non focal 	  Neck: no JVD  RES : clear , no wheezing    Chest:   + chest tubes                     CVS: Regular  rhythm. Normal S1/S2  Abd: Soft, non-distended. Bowel sounds present.  Skin: No rash.  Ext:  no edema, A Line  ============================I/O===========================   I&O's Detail    10 Sep 2021 07:01  -  10 Sep 2021 23:37  --------------------------------------------------------  IN:    Albumin 5%  - 250 mL: 1000 mL    Dexmedetomidine: 22.8 mL    Insulin: 28 mL    IV PiggyBack: 200 mL    IV PiggyBack: 250 mL    Lactated Ringers Bolus: 1500 mL    Norepinephrine: 62.2 mL    Oral Fluid: 240 mL    sodium chloride 0.9%: 130 mL    Vasopressin: 70.8 mL  Total IN: 3503.8 mL    OUT:    Chest Tube (mL): 215 mL    Indwelling Catheter - Urethral (mL): 770 mL    VAC (Vacuum Assisted Closure) System (mL): 77 mL  Total OUT: 1062 mL    Total NET: 2441.8 mL        ============================ LABS =========================                        12.1   21.23 )-----------( 192      ( 10 Sep 2021 13:07 )             35.8     09-10    139  |  101  |  20  ----------------------------<  131<H>  4.5   |  20<L>  |  0.92    Ca    8.7      10 Sep 2021 13:07  Phos  2.4     09-10  Mg     3.2     09-10    TPro  5.6<L>  /  Alb  3.6  /  TBili  0.5  /  DBili  x   /  AST  35  /  ALT  13  /  AlkPhos  51  09-10    LIVER FUNCTIONS - ( 10 Sep 2021 13:07 )  Alb: 3.6 g/dL / Pro: 5.6 g/dL / ALK PHOS: 51 U/L / ALT: 13 U/L / AST: 35 U/L / GGT: x           PT/INR - ( 10 Sep 2021 13:08 )   PT: 13.1 sec;   INR: 1.10 ratio         PTT - ( 10 Sep 2021 13:08 )  PTT:28.0 sec  ABG - ( 10 Sep 2021 20:41 )  pH, Arterial: 7.37  pH, Blood: x     /  pCO2: 47    /  pO2: 155   / HCO3: 27    / Base Excess: 1.5   /  SaO2: 99.5                ======================Micro/Rad/Cardio=================  CXR: Reviewed  Echo:Reviewed  ======================================================  PAST MEDICAL & SURGICAL HISTORY:  No pertinent past medical history    No significant past surgical history      ====================ASSESSMENT ==============  42y/o  male in general good health. Developed fever t max 104 x 48hrs presented to Urgent care on cardiac exam found to have + Murmur.     patient was referred to cardiologist, who did echocardiogram which showed severe MR and possible endocarditis, patient admitted to hospital,   Infectious workup was negative except patient was COVID + 08/29/2021 despite being faxed needed patient was on quadranteen,  cleared by ID for surgery.    9/10 /21 MV reconstruction (valvuloplasty + annuloplasty) AMBROCIO clip   Hemodynamic instability  Hypovolemic shock  stress hyperglycemia    Plan:  ====================== NEUROLOGY=====================  Continue close monitoring of neuro status     ==================== RESPIRATORY======================  Stable on RA, SpO2 98% - 100%  Encourage incentive spirometry, continue pulse ox monitoring, follow ABGs     ====================CARDIOVASCULAR==================  Developed fever t max 104 x 48hrs presented to Urgent care on cardiac exam found to have + Murmur.    MV reconstruction (valvuloplasty + annuloplasty)  AMBROCIO clip   IV levophed vasopressin for pressor support ,  titrate to mean blood pressure above 60  Fluid challenges albumin X 4, Ringer lactate 1.5 L, CVP single digit    norepinephrine Infusion 0.07 MICROgram(s)/kG/Min (10.1 mL/Hr) IV Continuous <Continuous>  vasopressin Infusion 0.06 Unit(s)/Min (3.6 mL/Hr) IV Continuous <Continuous>  ===================HEMATOLOGIC/ONC ===================   monitor Hb&Hct  ===================== RENAL =========================   fluid resuscitation with albumin and Ringer lactate  Foote for monitoring urine output  Replete lytes PRN. Keep K> 4 and Mg >2     ==================== GASTROINTESTINAL===================  Tolerating a clear liquid diet   Bowel regimen with Miralax   Reglan for gut motility     albumin human  5% IVPB 250 milliLiter(s) IV Intermittent once  GI prophylaxis, pantoprazole  Injectable 40 milliGRAM(s) IV Push daily  polyethylene glycol 3350 17 Gram(s) Oral daily  potassium chloride  10 mEq/50 mL IVPB 10 milliEquivalent(s) IV Intermittent every 1 hour  potassium chloride  10 mEq/50 mL IVPB 10 milliEquivalent(s) IV Intermittent every 1 hour  potassium chloride  10 mEq/50 mL IVPB 10 milliEquivalent(s) IV Intermittent every 1 hour  sodium chloride 0.9%. 1000 milliLiter(s) (10 mL/Hr) IV Continuous <Continuous>  metoclopramide Injectable 10 milliGRAM(s) IV Push every 8 hours  =======================    ENDOCRINE  =====================  stress hyperglycemia requiring glucose with insulin gtt, titrate as per insulin protocol along with hourly glucose checks      dextrose 50% Injectable 50 milliLiter(s) IV Push every 15 minutes  dextrose 50% Injectable 25 milliLiter(s) IV Push every 15 minutes  insulin regular Infusion 3 Unit(s)/Hr (3 mL/Hr) IV Continuous <Continuous>      ========================INFECTIOUS DISEASE================  Temp 99F, WBC high 21.23  Continue trending WBC and monitoring fever curve   Perioperative coverage with Cefuroxime     cefuroxime  IVPB 1500 milliGRAM(s) IV Intermittent every 8 hours     Patient is critically ill with vital organ impairment or failure, time spent 60 minutes patient seen examined multiple times during the night    By signing my name below, I, Alena Chau, attest that this documentation has been prepared under the direction and in the presence of Niranjan Cedeño MD.  Electronically signed: Juan Savage, 09-10-21 @ 23:37    I, Niranjan Cedeño, personally performed the services described in this documentation. all medical record entries made by the juan were at my direction and in my presence. I have reviewed the chart and agree that the record reflects my personal performance and is accurate and complete  Electronically signed: Niranjan Cedeño, 09-10-21 @ 23:37       MAX BOWER  MRN-[id number]  Patient is a 41y old  Male who presents with a chief complaint of  fever, seen at urgent care center he was found to have murmur, sent to cardiologist who did echo showed severe MR to on with on mitral valve papillary muscle, R/O  endocarditis.  Patient was admitted to hospital , endocarditis was ruled out.  Tested + for COVID-19, quarantine, and then cleared by Infectious Disease for cardiac surgery mitral valve  surgery.      Surgery/Hospital course:  9/10 MV reconstruction (valvuloplasty + annuloplasty), AMBROCIO clip     Today:    REVIEW OF SYSTEMS:  Gen: No fever  EYES/ENT: No visual changes;  No vertigo or throat pain   NECK: No pain   RES:  No shortness of breath or Cough  Chest: + incisional pain  CARD: No chest pain   GI: No abdominal pain  : No dysuria  NEURO: No weakness  SKIN: No itching, rashes     Physical Exam:  Vital Signs Last 24 Hrs  T(C): 37.7 (10 Sep 2021 22:00), Max: 37.7 (10 Sep 2021 20:00)  T(F): 99.9 (10 Sep 2021 22:00), Max: 99.9 (10 Sep 2021 20:00)  HR: 89 (10 Sep 2021 23:00) (86 - 108)  BP: 113/68 (10 Sep 2021 23:00) (96/58 - 113/68)  BP(mean): 88 (10 Sep 2021 23:00) (71 - 88)  RR: 13 (10 Sep 2021 23:00) (5 - 22)  SpO2: 100% (10 Sep 2021 23:00) (98% - 100%)  Gen:  Awake, alert   CNS: non focal 	  Neck: no JVD  RES : clear , no wheezing    Chest:   + chest tubes                     CVS: Regular  rhythm. Normal S1/S2  Abd: Soft, non-distended. Bowel sounds present.  Skin: No rash.  Ext:  no edema, A Line  ============================I/O===========================   I&O's Detail    10 Sep 2021 07:01  -  10 Sep 2021 23:37  --------------------------------------------------------  IN:    Albumin 5%  - 250 mL: 1000 mL    Dexmedetomidine: 22.8 mL    Insulin: 28 mL    IV PiggyBack: 200 mL    IV PiggyBack: 250 mL    Lactated Ringers Bolus: 1500 mL    Norepinephrine: 62.2 mL    Oral Fluid: 240 mL    sodium chloride 0.9%: 130 mL    Vasopressin: 70.8 mL  Total IN: 3503.8 mL    OUT:    Chest Tube (mL): 215 mL    Indwelling Catheter - Urethral (mL): 770 mL    VAC (Vacuum Assisted Closure) System (mL): 77 mL  Total OUT: 1062 mL    Total NET: 2441.8 mL        ============================ LABS =========================                        12.1   21.23 )-----------( 192      ( 10 Sep 2021 13:07 )             35.8     09-10    139  |  101  |  20  ----------------------------<  131<H>  4.5   |  20<L>  |  0.92    Ca    8.7      10 Sep 2021 13:07  Phos  2.4     09-10  Mg     3.2     09-10    TPro  5.6<L>  /  Alb  3.6  /  TBili  0.5  /  DBili  x   /  AST  35  /  ALT  13  /  AlkPhos  51  09-10    LIVER FUNCTIONS - ( 10 Sep 2021 13:07 )  Alb: 3.6 g/dL / Pro: 5.6 g/dL / ALK PHOS: 51 U/L / ALT: 13 U/L / AST: 35 U/L / GGT: x           PT/INR - ( 10 Sep 2021 13:08 )   PT: 13.1 sec;   INR: 1.10 ratio         PTT - ( 10 Sep 2021 13:08 )  PTT:28.0 sec  ABG - ( 10 Sep 2021 20:41 )  pH, Arterial: 7.37  pH, Blood: x     /  pCO2: 47    /  pO2: 155   / HCO3: 27    / Base Excess: 1.5   /  SaO2: 99.5                ======================Micro/Rad/Cardio=================  CXR: Reviewed  Echo:Reviewed  ======================================================  PAST MEDICAL & SURGICAL HISTORY:  No pertinent past medical history    No significant past surgical history      ====================ASSESSMENT ==============  42y/o  male in general good health. Developed fever t max 104 x 48hrs presented to Urgent care on cardiac exam found to have + Murmur.     patient was referred to cardiologist, who did echocardiogram which showed severe MR and possible endocarditis, patient admitted to hospital,   Infectious workup was negative except patient was COVID + 08/29/2021 despite being faxed needed patient was on quadranteen,  cleared by ID for surgery.    9/10 /21 MV reconstruction (valvuloplasty + annuloplasty) AMBROCIO clip   Hemodynamic instability  Hypovolemic shock  stress hyperglycemia    Plan:  ====================== NEUROLOGY=====================  Continue close monitoring of neuro status     ==================== RESPIRATORY======================  Stable on RA, SpO2 98% - 100%  Encourage incentive spirometry, continue pulse ox monitoring, follow ABGs     ====================CARDIOVASCULAR==================  Developed fever t max 104 x 48hrs presented to Urgent care on cardiac exam found to have + Murmur.    MV reconstruction (valvuloplasty + annuloplasty)  AMBROCIO clip   IV levophed vasopressin for pressor support ,  titrate to mean blood pressure above 60  Fluid challenges albumin X 4, Ringer lactate 1.5 L, CVP single digit    norepinephrine Infusion 0.07 MICROgram(s)/kG/Min (10.1 mL/Hr) IV Continuous <Continuous>  vasopressin Infusion 0.06 Unit(s)/Min (3.6 mL/Hr) IV Continuous <Continuous>  ===================HEMATOLOGIC/ONC ===================   monitor Hb&Hct  ===================== RENAL =========================   fluid resuscitation with albumin and Ringer lactate  Foote for monitoring urine output  Replete lytes PRN. Keep K> 4 and Mg >2     ==================== GASTROINTESTINAL===================  Tolerating a clear liquid diet   Bowel regimen with Miralax   Reglan for gut motility     albumin human  5% IVPB 250 milliLiter(s) IV Intermittent once  GI prophylaxis, pantoprazole  Injectable 40 milliGRAM(s) IV Push daily  polyethylene glycol 3350 17 Gram(s) Oral daily  potassium chloride  10 mEq/50 mL IVPB 10 milliEquivalent(s) IV Intermittent every 1 hour  potassium chloride  10 mEq/50 mL IVPB 10 milliEquivalent(s) IV Intermittent every 1 hour  potassium chloride  10 mEq/50 mL IVPB 10 milliEquivalent(s) IV Intermittent every 1 hour  sodium chloride 0.9%. 1000 milliLiter(s) (10 mL/Hr) IV Continuous <Continuous>  metoclopramide Injectable 10 milliGRAM(s) IV Push every 8 hours  =======================    ENDOCRINE  =====================  stress hyperglycemia requiring glucose with insulin gtt, titrate as per insulin protocol along with hourly glucose checks      dextrose 50% Injectable 50 milliLiter(s) IV Push every 15 minutes  dextrose 50% Injectable 25 milliLiter(s) IV Push every 15 minutes  insulin regular Infusion 3 Unit(s)/Hr (3 mL/Hr) IV Continuous <Continuous>      ========================INFECTIOUS DISEASE================  Temp 99F, WBC high 21.23  Continue trending WBC and monitoring fever curve   Perioperative coverage with Cefuroxime     cefuroxime  IVPB 1500 milliGRAM(s) IV Intermittent every 8 hours     Patient is critically ill with vital organ impairment or failure, time spent 60 minutes patient seen examined multiple times during the night    By signing my name below, I, Alena Chau, attest that this documentation has been prepared under the direction and in the presence of Nrianjan Cedeño MD.  Electronically signed: Jarrod Savage, 09-10-21 @ 23:37    I, Niranjan Cedeño, personally performed the services described in this documentation. all medical record entries made by the scribe were at my direction and in my presence. I have reviewed the chart and agree that the record reflects my personal performance and is accurate and complete  Electronically signed: Niranjan Cedeño, 09-10-21 @ 23:37

## 2021-09-10 NOTE — PROGRESS NOTE ADULT - SUBJECTIVE AND OBJECTIVE BOX
CRITICAL CARE ATTENDING - CTICU    MEDICATIONS  (STANDING):  ceFAZolin   IVPB 2000 milliGRAM(s) IV Intermittent once  chlorhexidine 0.12% Liquid 15 milliLiter(s) Oral Mucosa every 12 hours  chlorhexidine 2% Cloths 1 Application(s) Topical <User Schedule>  dextrose 50% Injectable 50 milliLiter(s) IV Push every 15 minutes  dextrose 50% Injectable 25 milliLiter(s) IV Push every 15 minutes  insulin regular Infusion 3 Unit(s)/Hr (3 mL/Hr) IV Continuous <Continuous>  pantoprazole  Injectable 40 milliGRAM(s) IV Push daily  polyethylene glycol 3350 17 Gram(s) Oral daily  potassium chloride  10 mEq/50 mL IVPB 10 milliEquivalent(s) IV Intermittent every 1 hour  potassium chloride  10 mEq/50 mL IVPB 10 milliEquivalent(s) IV Intermittent every 1 hour  potassium chloride  10 mEq/50 mL IVPB 10 milliEquivalent(s) IV Intermittent every 1 hour  sodium chloride 0.9%. 1000 milliLiter(s) (10 mL/Hr) IV Continuous <Continuous>                              Mode: AC/ CMV (Assist Control/ Continuous Mandatory Ventilation)  RR (machine): 10  TV (machine): 700  FiO2: 100  PEEP: 5  ITime: 1  MAP: 8  PIP: 18      Daily Height in cm: 195.58 (10 Sep 2021 05:30)    Daily         Critically Ill patient  : [ ] preoperative ,   [x ] post operative    Requires :  [ x] Arterial Line   [ x] Central Line  [ ] PA catheter  [ ] IABP  [ ] ECMO  [ ] LVAD  [x ] Ventilator  [x ] pacemaker - TPM [ ] Impella.                      [ x] ABG's     [x ] Pulse Oxymetry Monitoring  Bedside evaluation , monitoring , treatment of hemodynamics , fluids , IVP/ IVCD meds.        Diagnosis:     Op day - MV Ring / repair    Hypotension    Hemodynamic lability,  instability. Requires IVCD [ x] vasopressors [ ] inotropes  [ ] vasodilator  [x ]IVSS fluid  to maintain MAP, perfusion, C.I.     CHF- acute [ x]   chronic [x ]    systolic [x ]   diatolic [ ]          - Echo- EF -  MR  -   ruptured chordae          [ ] RV dysfunction          - Cxr-cardiomegally, edema          - Clinical-  [ ]inotropes   [ x]pressors   [ ]diuresis   [ ]IABP   [ ]ECMO   [ ]LVAD   [ ]Respiratory Failure    Temporary pacemaker (TPM) interrogation and setting.     Chest Tube Drainage     ECG     Requires bedside physical therapy, mobilization and total assisted care.     Ventilator Management:  [ x]AC-rest    [ x]CPAP-PS Wean this PM     [ ]Trach Collar     [ ]Extubate    [ ] T-Piece  [ ]peep>5     rapid weaning candidate    Requires chest PT, pulmonary toilet, ambu bagging, suctioning to maintain SaO2,  patent airway and treat atelectasis.     COVID 19 in recent past    PICC line to be removed - not endocarditis                     -                     Discussed with CT surgeon, Physician's Assistant - Nurse Practitioner- Critical care medicine team.   Discussed at  AM / PM rounds.   Chart, labs , films reviewed.    Cumulative Critical Care Time Given Today : 30 min

## 2021-09-10 NOTE — PRE-ANESTHESIA EVALUATION ADULT - NSANTHPMHFT_GEN_ALL_CORE
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

## 2021-09-10 NOTE — BRIEF OPERATIVE NOTE - NSICDXBRIEFPROCEDURE_GEN_ALL_CORE_FT
PROCEDURES:  Open reconstruction of mitral valve without replacement 10-Sep-2021 12:52:49 #28 Gold annuloplasty ring and P2 resection Juan Ibrahim  Clipping, left atrial appendage 10-Sep-2021 12:58:27  Juan Ibrahim

## 2021-09-11 LAB
ALBUMIN SERPL ELPH-MCNC: 3.8 G/DL — SIGNIFICANT CHANGE UP (ref 3.3–5)
ALP SERPL-CCNC: 37 U/L — LOW (ref 40–120)
ALT FLD-CCNC: 13 U/L — SIGNIFICANT CHANGE UP (ref 10–45)
ANION GAP SERPL CALC-SCNC: 13 MMOL/L — SIGNIFICANT CHANGE UP (ref 5–17)
AST SERPL-CCNC: 35 U/L — SIGNIFICANT CHANGE UP (ref 10–40)
BASOPHILS # BLD AUTO: 0.02 K/UL — SIGNIFICANT CHANGE UP (ref 0–0.2)
BASOPHILS NFR BLD AUTO: 0.1 % — SIGNIFICANT CHANGE UP (ref 0–2)
BILIRUB SERPL-MCNC: 0.4 MG/DL — SIGNIFICANT CHANGE UP (ref 0.2–1.2)
BUN SERPL-MCNC: 19 MG/DL — SIGNIFICANT CHANGE UP (ref 7–23)
CALCIUM SERPL-MCNC: 8.7 MG/DL — SIGNIFICANT CHANGE UP (ref 8.4–10.5)
CHLORIDE SERPL-SCNC: 101 MMOL/L — SIGNIFICANT CHANGE UP (ref 96–108)
CK MB BLD-MCNC: 3.8 % — HIGH (ref 0–3.5)
CK MB CFR SERPL CALC: 15.4 NG/ML — HIGH (ref 0–6.7)
CK SERPL-CCNC: 402 U/L — HIGH (ref 30–200)
CO2 SERPL-SCNC: 23 MMOL/L — SIGNIFICANT CHANGE UP (ref 22–31)
CREAT SERPL-MCNC: 1.02 MG/DL — SIGNIFICANT CHANGE UP (ref 0.5–1.3)
EOSINOPHIL # BLD AUTO: 0 K/UL — SIGNIFICANT CHANGE UP (ref 0–0.5)
EOSINOPHIL NFR BLD AUTO: 0 % — SIGNIFICANT CHANGE UP (ref 0–6)
GAS PNL BLDA: SIGNIFICANT CHANGE UP
GAS PNL BLDA: SIGNIFICANT CHANGE UP
GAS PNL BLDV: SIGNIFICANT CHANGE UP
GLUCOSE SERPL-MCNC: 113 MG/DL — HIGH (ref 70–99)
HCT VFR BLD CALC: 28.6 % — LOW (ref 39–50)
HGB BLD-MCNC: 9.4 G/DL — LOW (ref 13–17)
IMM GRANULOCYTES NFR BLD AUTO: 0.4 % — SIGNIFICANT CHANGE UP (ref 0–1.5)
LYMPHOCYTES # BLD AUTO: 0.52 K/UL — LOW (ref 1–3.3)
LYMPHOCYTES # BLD AUTO: 3.8 % — LOW (ref 13–44)
MAGNESIUM SERPL-MCNC: 2.5 MG/DL — SIGNIFICANT CHANGE UP (ref 1.6–2.6)
MCHC RBC-ENTMCNC: 28.9 PG — SIGNIFICANT CHANGE UP (ref 27–34)
MCHC RBC-ENTMCNC: 32.9 GM/DL — SIGNIFICANT CHANGE UP (ref 32–36)
MCV RBC AUTO: 88 FL — SIGNIFICANT CHANGE UP (ref 80–100)
MONOCYTES # BLD AUTO: 0.74 K/UL — SIGNIFICANT CHANGE UP (ref 0–0.9)
MONOCYTES NFR BLD AUTO: 5.5 % — SIGNIFICANT CHANGE UP (ref 2–14)
NEUTROPHILS # BLD AUTO: 12.17 K/UL — HIGH (ref 1.8–7.4)
NEUTROPHILS NFR BLD AUTO: 90.2 % — HIGH (ref 43–77)
NRBC # BLD: 0 /100 WBCS — SIGNIFICANT CHANGE UP (ref 0–0)
PHOSPHATE SERPL-MCNC: 4.3 MG/DL — SIGNIFICANT CHANGE UP (ref 2.5–4.5)
PLATELET # BLD AUTO: 179 K/UL — SIGNIFICANT CHANGE UP (ref 150–400)
POTASSIUM SERPL-MCNC: 4.5 MMOL/L — SIGNIFICANT CHANGE UP (ref 3.5–5.3)
POTASSIUM SERPL-SCNC: 4.5 MMOL/L — SIGNIFICANT CHANGE UP (ref 3.5–5.3)
PROT SERPL-MCNC: 5.5 G/DL — LOW (ref 6–8.3)
RBC # BLD: 3.25 M/UL — LOW (ref 4.2–5.8)
RBC # FLD: 11.8 % — SIGNIFICANT CHANGE UP (ref 10.3–14.5)
SODIUM SERPL-SCNC: 137 MMOL/L — SIGNIFICANT CHANGE UP (ref 135–145)
TROPONIN T, HIGH SENSITIVITY RESULT: 358 NG/L — HIGH (ref 0–51)
WBC # BLD: 13.51 K/UL — HIGH (ref 3.8–10.5)
WBC # FLD AUTO: 13.51 K/UL — HIGH (ref 3.8–10.5)

## 2021-09-11 PROCEDURE — 93010 ELECTROCARDIOGRAM REPORT: CPT

## 2021-09-11 PROCEDURE — 99254 IP/OBS CNSLTJ NEW/EST MOD 60: CPT | Mod: GC

## 2021-09-11 PROCEDURE — 36620 INSERTION CATHETER ARTERY: CPT | Mod: 78

## 2021-09-11 PROCEDURE — 99291 CRITICAL CARE FIRST HOUR: CPT

## 2021-09-11 PROCEDURE — 71045 X-RAY EXAM CHEST 1 VIEW: CPT | Mod: 26

## 2021-09-11 RX ORDER — ALBUMIN HUMAN 25 %
250 VIAL (ML) INTRAVENOUS
Refills: 0 | Status: COMPLETED | OUTPATIENT
Start: 2021-09-11 | End: 2021-09-11

## 2021-09-11 RX ORDER — INSULIN LISPRO 100/ML
VIAL (ML) SUBCUTANEOUS AT BEDTIME
Refills: 0 | Status: DISCONTINUED | OUTPATIENT
Start: 2021-09-11 | End: 2021-09-12

## 2021-09-11 RX ORDER — HYDROMORPHONE HYDROCHLORIDE 2 MG/ML
0.5 INJECTION INTRAMUSCULAR; INTRAVENOUS; SUBCUTANEOUS
Refills: 0 | Status: DISCONTINUED | OUTPATIENT
Start: 2021-09-11 | End: 2021-09-13

## 2021-09-11 RX ORDER — NALOXONE HYDROCHLORIDE 4 MG/.1ML
0.1 SPRAY NASAL
Refills: 0 | Status: DISCONTINUED | OUTPATIENT
Start: 2021-09-11 | End: 2021-09-13

## 2021-09-11 RX ORDER — PANTOPRAZOLE SODIUM 20 MG/1
40 TABLET, DELAYED RELEASE ORAL
Refills: 0 | Status: DISCONTINUED | OUTPATIENT
Start: 2021-09-11 | End: 2021-09-15

## 2021-09-11 RX ORDER — NOREPINEPHRINE BITARTRATE/D5W 8 MG/250ML
0.05 PLASTIC BAG, INJECTION (ML) INTRAVENOUS
Qty: 8 | Refills: 0 | Status: DISCONTINUED | OUTPATIENT
Start: 2021-09-11 | End: 2021-09-12

## 2021-09-11 RX ORDER — ACETAMINOPHEN 500 MG
1000 TABLET ORAL ONCE
Refills: 0 | Status: COMPLETED | OUTPATIENT
Start: 2021-09-11 | End: 2021-09-11

## 2021-09-11 RX ORDER — KETOROLAC TROMETHAMINE 30 MG/ML
15 SYRINGE (ML) INJECTION ONCE
Refills: 0 | Status: DISCONTINUED | OUTPATIENT
Start: 2021-09-11 | End: 2021-09-11

## 2021-09-11 RX ORDER — ALBUMIN HUMAN 25 %
250 VIAL (ML) INTRAVENOUS ONCE
Refills: 0 | Status: COMPLETED | OUTPATIENT
Start: 2021-09-11 | End: 2021-09-11

## 2021-09-11 RX ORDER — ASPIRIN/CALCIUM CARB/MAGNESIUM 324 MG
81 TABLET ORAL DAILY
Refills: 0 | Status: DISCONTINUED | OUTPATIENT
Start: 2021-09-11 | End: 2021-09-15

## 2021-09-11 RX ORDER — OXYCODONE AND ACETAMINOPHEN 5; 325 MG/1; MG/1
2 TABLET ORAL EVERY 4 HOURS
Refills: 0 | Status: DISCONTINUED | OUTPATIENT
Start: 2021-09-11 | End: 2021-09-13

## 2021-09-11 RX ORDER — FUROSEMIDE 40 MG
10 TABLET ORAL ONCE
Refills: 0 | Status: COMPLETED | OUTPATIENT
Start: 2021-09-11 | End: 2021-09-11

## 2021-09-11 RX ORDER — HYDROMORPHONE HYDROCHLORIDE 2 MG/ML
0.5 INJECTION INTRAMUSCULAR; INTRAVENOUS; SUBCUTANEOUS ONCE
Refills: 0 | Status: DISCONTINUED | OUTPATIENT
Start: 2021-09-11 | End: 2021-09-11

## 2021-09-11 RX ORDER — HYDROMORPHONE HYDROCHLORIDE 2 MG/ML
30 INJECTION INTRAMUSCULAR; INTRAVENOUS; SUBCUTANEOUS
Refills: 0 | Status: DISCONTINUED | OUTPATIENT
Start: 2021-09-11 | End: 2021-09-13

## 2021-09-11 RX ORDER — INSULIN LISPRO 100/ML
VIAL (ML) SUBCUTANEOUS
Refills: 0 | Status: DISCONTINUED | OUTPATIENT
Start: 2021-09-11 | End: 2021-09-12

## 2021-09-11 RX ORDER — COLCHICINE 0.6 MG
0.6 TABLET ORAL EVERY 12 HOURS
Refills: 0 | Status: DISCONTINUED | OUTPATIENT
Start: 2021-09-11 | End: 2021-09-15

## 2021-09-11 RX ORDER — NALBUPHINE HYDROCHLORIDE 10 MG/ML
2.5 INJECTION, SOLUTION INTRAMUSCULAR; INTRAVENOUS; SUBCUTANEOUS EVERY 6 HOURS
Refills: 0 | Status: DISCONTINUED | OUTPATIENT
Start: 2021-09-11 | End: 2021-09-13

## 2021-09-11 RX ORDER — OXYCODONE AND ACETAMINOPHEN 5; 325 MG/1; MG/1
1 TABLET ORAL EVERY 4 HOURS
Refills: 0 | Status: DISCONTINUED | OUTPATIENT
Start: 2021-09-11 | End: 2021-09-13

## 2021-09-11 RX ORDER — ONDANSETRON 8 MG/1
4 TABLET, FILM COATED ORAL EVERY 6 HOURS
Refills: 0 | Status: DISCONTINUED | OUTPATIENT
Start: 2021-09-11 | End: 2021-09-13

## 2021-09-11 RX ORDER — ENOXAPARIN SODIUM 100 MG/ML
40 INJECTION SUBCUTANEOUS DAILY
Refills: 0 | Status: DISCONTINUED | OUTPATIENT
Start: 2021-09-11 | End: 2021-09-15

## 2021-09-11 RX ADMIN — Medication 2: at 14:21

## 2021-09-11 RX ADMIN — OXYCODONE AND ACETAMINOPHEN 2 TABLET(S): 5; 325 TABLET ORAL at 13:09

## 2021-09-11 RX ADMIN — Medication 125 MILLILITER(S): at 05:01

## 2021-09-11 RX ADMIN — Medication 81 MILLIGRAM(S): at 09:03

## 2021-09-11 RX ADMIN — HYDROMORPHONE HYDROCHLORIDE 30 MILLILITER(S): 2 INJECTION INTRAMUSCULAR; INTRAVENOUS; SUBCUTANEOUS at 19:25

## 2021-09-11 RX ADMIN — Medication 10 MILLIGRAM(S): at 19:41

## 2021-09-11 RX ADMIN — Medication 10 MILLIGRAM(S): at 13:11

## 2021-09-11 RX ADMIN — Medication 100 MILLIGRAM(S): at 16:46

## 2021-09-11 RX ADMIN — HYDROMORPHONE HYDROCHLORIDE 0.5 MILLIGRAM(S): 2 INJECTION INTRAMUSCULAR; INTRAVENOUS; SUBCUTANEOUS at 04:00

## 2021-09-11 RX ADMIN — CHLORHEXIDINE GLUCONATE 1 APPLICATION(S): 213 SOLUTION TOPICAL at 05:00

## 2021-09-11 RX ADMIN — HYDROMORPHONE HYDROCHLORIDE 0.5 MILLIGRAM(S): 2 INJECTION INTRAMUSCULAR; INTRAVENOUS; SUBCUTANEOUS at 08:00

## 2021-09-11 RX ADMIN — Medication 125 MILLILITER(S): at 13:10

## 2021-09-11 RX ADMIN — Medication 10 MILLIGRAM(S): at 22:07

## 2021-09-11 RX ADMIN — Medication 1000 MILLIGRAM(S): at 00:45

## 2021-09-11 RX ADMIN — Medication 15 MILLIGRAM(S): at 18:00

## 2021-09-11 RX ADMIN — OXYCODONE AND ACETAMINOPHEN 2 TABLET(S): 5; 325 TABLET ORAL at 01:41

## 2021-09-11 RX ADMIN — VASOPRESSIN 3.6 UNIT(S)/MIN: 20 INJECTION INTRAVENOUS at 12:10

## 2021-09-11 RX ADMIN — Medication 10 MILLIGRAM(S): at 05:01

## 2021-09-11 RX ADMIN — Medication 15 MILLIGRAM(S): at 09:50

## 2021-09-11 RX ADMIN — Medication 0.6 MILLIGRAM(S): at 14:59

## 2021-09-11 RX ADMIN — PANTOPRAZOLE SODIUM 40 MILLIGRAM(S): 20 TABLET, DELAYED RELEASE ORAL at 09:03

## 2021-09-11 RX ADMIN — VASOPRESSIN 3.6 UNIT(S)/MIN: 20 INJECTION INTRAVENOUS at 13:11

## 2021-09-11 RX ADMIN — Medication 15 MILLIGRAM(S): at 10:45

## 2021-09-11 RX ADMIN — HYDROMORPHONE HYDROCHLORIDE 0.5 MILLIGRAM(S): 2 INJECTION INTRAMUSCULAR; INTRAVENOUS; SUBCUTANEOUS at 04:15

## 2021-09-11 RX ADMIN — Medication 400 MILLIGRAM(S): at 00:30

## 2021-09-11 RX ADMIN — Medication 125 MILLILITER(S): at 12:09

## 2021-09-11 RX ADMIN — ENOXAPARIN SODIUM 40 MILLIGRAM(S): 100 INJECTION SUBCUTANEOUS at 12:10

## 2021-09-11 RX ADMIN — Medication 100 MILLIGRAM(S): at 07:52

## 2021-09-11 RX ADMIN — Medication 15 MILLIGRAM(S): at 18:30

## 2021-09-11 RX ADMIN — HYDROMORPHONE HYDROCHLORIDE 0.5 MILLIGRAM(S): 2 INJECTION INTRAMUSCULAR; INTRAVENOUS; SUBCUTANEOUS at 08:30

## 2021-09-11 RX ADMIN — HYDROMORPHONE HYDROCHLORIDE 30 MILLILITER(S): 2 INJECTION INTRAMUSCULAR; INTRAVENOUS; SUBCUTANEOUS at 18:16

## 2021-09-11 NOTE — PROCEDURE NOTE - NSPROCDETAILS_GEN_ALL_CORE
location identified, draped/prepped, sterile technique used, needle inserted/introduced/positive blood return obtained via catheter/sutured in place/hemostasis with direct pressure, dressing applied

## 2021-09-11 NOTE — PHYSICAL THERAPY INITIAL EVALUATION ADULT - LIVES WITH, PROFILE
Pt reports he lives with spouse and 2 children in home with no steps to enter, and 5 + & steps with HR to second floor with bedroom and bathroom. Prior to admission pt independent with all functional mobility including ambulation without AD./children/spouse

## 2021-09-11 NOTE — PHYSICAL THERAPY INITIAL EVALUATION ADULT - PLANNED THERAPY INTERVENTIONS, PT EVAL
stair training; GOAL: Pt will negotiate up & down 12 stairs independently with unilateral HR & least restrictive AD, in 2 weeks./bed mobility training/gait training/transfer training

## 2021-09-11 NOTE — PROGRESS NOTE ADULT - SUBJECTIVE AND OBJECTIVE BOX
Medications:  albumin human  5% IVPB 250 milliLiter(s) IV Intermittent once  albumin human  5% IVPB 250 milliLiter(s) IV Intermittent every 10 minutes  aspirin enteric coated 81 milliGRAM(s) Oral daily  cefuroxime  IVPB 1500 milliGRAM(s) IV Intermittent every 8 hours  chlorhexidine 2% Cloths 1 Application(s) Topical <User Schedule>  enoxaparin Injectable 40 milliGRAM(s) SubCutaneous daily  insulin lispro (ADMELOG) corrective regimen sliding scale   SubCutaneous three times a day before meals  insulin lispro (ADMELOG) corrective regimen sliding scale   SubCutaneous at bedtime  metoclopramide Injectable 10 milliGRAM(s) IV Push every 8 hours  pantoprazole    Tablet 40 milliGRAM(s) Oral before breakfast  polyethylene glycol 3350 17 Gram(s) Oral daily  sodium chloride 0.9%. 1000 milliLiter(s) IV Continuous <Continuous>  vasopressin Infusion 0.06 Unit(s)/Min IV Continuous <Continuous>    PMH/PSH/FH/SH: Unchanged    ROS:      Vitals:  T(C): 37.3 (09-11-21 @ 08:00), Max: 37.7 (09-10-21 @ 20:00)  HR: 84 (09-11-21 @ 11:20) (80 - 108)  BP: 101/58 (09-11-21 @ 11:20) (94/54 - 126/75)  BP(mean): 68 (09-11-21 @ 11:20) (68 - 95)  RR: 20 (09-11-21 @ 11:00) (5 - 27)  SpO2: 100% (09-11-21 @ 11:00) (99% - 100%)          I&O's Summary  10 Sep 2021 07:01  -  11 Sep 2021 07:00  --------------------------------------------------------  IN: 4003.6 mL / OUT: 1462 mL / NET: 2541.6 mL    11 Sep 2021 07:01  -  11 Sep 2021 11:57  --------------------------------------------------------  IN: 453 mL / OUT: 305 mL / NET: 148 mL          09-11    LABS:                     9.4    13.51 )-----------( 179      ( 11 Sep 2021 00:39 )             28.6     09-11  137  |  101  |  19  ----------------------------<  113<H>  4.5   |  23  |  1.02    Ca    8.7      11 Sep 2021 00:39  Phos  4.3     09-11  Mg     2.5     09-11    PT/INR - ( 10 Sep 2021 13:08 )   PT: 13.1 sec;   INR: 1.10 ratio    PTT - ( 10 Sep 2021 13:08 )  PTT:28.0 sec    CARDIAC MARKERS ( 10 Sep 2021 13:07 )  x     / x     / 303 U/L / x     / 30.3 ng/mL     Alert, no distress.  Currently, OOB to chair.  Walked earlier.  Able to eat small breakfast.      Medications:  albumin human  5% IVPB 250 milliLiter(s) IV Intermittent once  albumin human  5% IVPB 250 milliLiter(s) IV Intermittent every 10 minutes  aspirin enteric coated 81 milliGRAM(s) Oral daily  cefuroxime  IVPB 1500 milliGRAM(s) IV Intermittent every 8 hours  chlorhexidine 2% Cloths 1 Application(s) Topical <User Schedule>  enoxaparin Injectable 40 milliGRAM(s) SubCutaneous daily  insulin lispro (ADMELOG) corrective regimen sliding scale   SubCutaneous three times a day before meals  insulin lispro (ADMELOG) corrective regimen sliding scale   SubCutaneous at bedtime  metoclopramide Injectable 10 milliGRAM(s) IV Push every 8 hours  pantoprazole    Tablet 40 milliGRAM(s) Oral before breakfast  polyethylene glycol 3350 17 Gram(s) Oral daily  sodium chloride 0.9%. 1000 milliLiter(s) IV Continuous <Continuous>  vasopressin Infusion 0.06 Unit(s)/Min IV Continuous <Continuous>    PMH/PSH/FH/SH: Unchanged    ROS:  Unchanged    Vitals:  T(C): 37.3 (09-11-21 @ 08:00), Max: 37.7 (09-10-21 @ 20:00)  HR: 84 (09-11-21 @ 11:20) (80 - 108)  BP: 101/58 (09-11-21 @ 11:20) (94/54 - 126/75)  BP(mean): 68 (09-11-21 @ 11:20) (68 - 95)  RR: 20 (09-11-21 @ 11:00) (5 - 27)  SpO2: 100% (09-11-21 @ 11:00) (99% - 100%)    EXAM:  GENERAL:  Alert, no distress  HEENT: Normocephalic, EOM intact, PERRLA  NECK: Normal carotid upstrokes, no bruit; No JVD.  Central line in place.  CHEST:  Mild decreased BS at posterior base, otherwise clear to auscultation.  Sternal dressing clean/dry  HEART:  PMI not displaced.  Normal S1 and S2. + friction rub; no gallop.  ABDOMEN: Normal bowel sounds, no bruit. Soft, non-tender.  Liver and spleen not palpable; aorta not palpable.  EXT:  No edema, no varicosities, 2+ pulses in upper and lower extremities.  PSYCH:  A&Ox3, normal insight, no anxiety  NEURO: Non-focal  SKIN:  No rash       I&O's Summary  10 Sep 2021 07:01  -  11 Sep 2021 07:00  --------------------------------------------------------  IN: 4003.6 mL / OUT: 1462 mL / NET: 2541.6 mL    11 Sep 2021 07:01  -  11 Sep 2021 11:57  --------------------------------------------------------  IN: 453 mL / OUT: 305 mL / NET: 148 mL      LABS  LABS:                     9.4    13.51 )-----------( 179      ( 11 Sep 2021 00:39 )             28.6     09-11  137  |  101  |  19  ----------------------------<  113<H>  4.5   |  23  |  1.02    Ca    8.7      11 Sep 2021 00:39  Phos  4.3     09-11  Mg     2.5     09-11    PT/INR - ( 10 Sep 2021 13:08 )   PT: 13.1 sec;   INR: 1.10 ratio    PTT - ( 10 Sep 2021 13:08 )  PTT:28.0 sec    CARDIAC MARKERS ( 10 Sep 2021 13:07 )  x     / x     / 303 U/L / x     / 30.3 ng/mL      Xray Chest 1 View- PORTABLE-Routine (09.11.21 @ 01:55)   CLINICAL INDICATION: Status post cardiothoracic surgery.    TECHNIQUE: Single frontal, portable view of the chest was obtained.    COMPARISON: Multiple prior chest radiographs, most recent dated 9/10/2021.    FINDINGS:  Interval removal of endotracheal tube and enteric tube. Right central venous catheter with tip in the SVC. Mediastinal and pericardial drains in place.  The heart size is not well evaluated in this projection. Status post median sternotomy. Left atrial appendage clip in place.  The lungs are clear. No focal consolidations.  There is no pneumothorax or pleural effusion.    IMPRESSION:  Interval removal of endotracheal tube and enteric tube. All of the tubes and lines above.  Clear lungs.    SONY RAJPUT DO; Resident Radiologist  This document has been electronically signed.  COREY RANDALL; Attending Radiologist  This document has been electronically signed. Sep 11 2021 10:37AM

## 2021-09-11 NOTE — PHYSICAL THERAPY INITIAL EVALUATION ADULT - ACTIVE RANGE OF MOTION EXAMINATION, REHAB EVAL
b/l shoulder flex/abd assessed 0-90 degrees/bilateral upper extremity Active ROM was WFL (within functional limits)/bilateral  lower extremity Active ROM was WFL (within functional limits)

## 2021-09-11 NOTE — PHYSICAL THERAPY INITIAL EVALUATION ADULT - PRECAUTIONS/LIMITATIONS, REHAB EVAL
Now s/p mitral valve reconstruction with resection of P2, reconstruction of P1 and P3, annuloplasty band utilizing a 28 Odalis annuloplasty band by Dr. Rodrigues on Sept. 10./cardiac precautions/fall precautions/sternal precautions

## 2021-09-11 NOTE — CONSULT NOTE ADULT - SUBJECTIVE AND OBJECTIVE BOX
42 yo M with history of cardiac murmur, presented with orthopnea, fever and newly diagnosed severe MR with signs of CHF on exam.  Also suffered asymptomatic COVID infection.  No epicardial CAD on cath. No evidence of  endocarditis.  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      Now s/p mitral valve reconstruction with resection of P2, reconstruction of P1 and P3, annuloplasty band utilizing a 28 Odalis annuloplasty band by Dr. Rodrigues on Sept. 10; s/p atrial appendage clip.    At present, alert, no distress.  Currently, OOB to chair.  Walked earlier.  Able to eat small breakfast.    No known allergies    Medications:  albumin human  5% IVPB 250 milliLiter(s) IV Intermittent once  albumin human  5% IVPB 250 milliLiter(s) IV Intermittent every 10 minutes  aspirin enteric coated 81 milliGRAM(s) Oral daily  cefuroxime  IVPB 1500 milliGRAM(s) IV Intermittent every 8 hours  chlorhexidine 2% Cloths 1 Application(s) Topical <User Schedule>  enoxaparin Injectable 40 milliGRAM(s) SubCutaneous daily  insulin lispro (ADMELOG) corrective regimen sliding scale   SubCutaneous three times a day before meals  insulin lispro (ADMELOG) corrective regimen sliding scale   SubCutaneous at bedtime  metoclopramide Injectable 10 milliGRAM(s) IV Push every 8 hours  pantoprazole    Tablet 40 milliGRAM(s) Oral before breakfast  polyethylene glycol 3350 17 Gram(s) Oral daily  sodium chloride 0.9%. 1000 milliLiter(s) IV Continuous <Continuous>  vasopressin Infusion 0.06 Unit(s)/Min IV Continuous <Continuous>    PMH/PSH:  negative    ROS:  General: no fatigue/malaise, weight loss/gain.  Skin: no rashes.  Eyes: no blurred vision, no loss of vision. 	  ENT: no sore throat, rhinorrhea, sinus congestion.  Gastrointestinal:  no N/V/D, no melena/hematemesis/hematochezia.  Genitourinary: no dysuria/hesitancy or hematuria.  Musculoskeletal: no myalgias or arthralgias.  Neurological: no changes in vision or hearing, no lightheadedness/dizziness, no syncope/near syncope	  Psychiatric: no unusual stress/anxiety.   Hematology/Lymphatics: no unusual bleeding, bruising and no lymphadenopathy.  All others negative except as stated above and in HPI.       Social Hx.     No tobacco, social ETOH    Vitals:  T(C): 37.3 (09-11-21 @ 08:00), Max: 37.7 (09-10-21 @ 20:00)  HR: 84 (09-11-21 @ 11:20) (80 - 108)  BP: 101/58 (09-11-21 @ 11:20) (94/54 - 126/75)  BP(mean): 68 (09-11-21 @ 11:20) (68 - 95)  RR: 20 (09-11-21 @ 11:00) (5 - 27)  SpO2: 100% (09-11-21 @ 11:00) (99% - 100%)    EXAM:  GENERAL:  Alert, no distress  HEENT: Normocephalic, EOM intact, PERRLA  NECK: Normal carotid upstrokes, no bruit; No JVD.  Central line in place.  CHEST:  Mild decreased BS at posterior base, otherwise clear to auscultation.  Sternal dressing clean/dry  HEART:  PMI not displaced.  Normal S1 and S2. + friction rub; no gallop.  ABDOMEN: Normal bowel sounds, no bruit. Soft, non-tender.  Liver and spleen not palpable; aorta not palpable.  EXT:  No edema, no varicosities, 2+ pulses in upper and lower extremities.  PSYCH:  A&Ox3, normal insight, no anxiety  NEURO: Non-focal  SKIN:  No rash     Monitor:  NSR        I&O's Summary  10 Sep 2021 07:01  -  11 Sep 2021 07:00  --------------------------------------------------------  IN: 4003.6 mL / OUT: 1462 mL / NET: 2541.6 mL    11 Sep 2021 07:01  -  11 Sep 2021 11:57  --------------------------------------------------------  IN: 453 mL / OUT: 305 mL / NET: 148 mL      LABS  LABS:                     9.4    13.51 )-----------( 179      ( 11 Sep 2021 00:39 )             28.6     09-11  137  |  101  |  19  ----------------------------<  113<H>  4.5   |  23  |  1.02    Ca    8.7      11 Sep 2021 00:39  Phos  4.3     09-11  Mg     2.5     09-11    PT/INR - ( 10 Sep 2021 13:08 )   PT: 13.1 sec;   INR: 1.10 ratio    PTT - ( 10 Sep 2021 13:08 )  PTT:28.0 sec    CARDIAC MARKERS ( 10 Sep 2021 13:07 )  x     / x     / 303 U/L / x     / 30.3 ng/mL      Xray Chest 1 View- PORTABLE-Routine (09.11.21 @ 01:55)   CLINICAL INDICATION: Status post cardiothoracic surgery.    TECHNIQUE: Single frontal, portable view of the chest was obtained.    COMPARISON: Multiple prior chest radiographs, most recent dated 9/10/2021.    FINDINGS:  Interval removal of endotracheal tube and enteric tube. Right central venous catheter with tip in the SVC. Mediastinal and pericardial drains in place.  The heart size is not well evaluated in this projection. Status post median sternotomy. Left atrial appendage clip in place.  The lungs are clear. No focal consolidations.  There is no pneumothorax or pleural effusion.    IMPRESSION:  Interval removal of endotracheal tube and enteric tube. All of the tubes and lines above.  Clear lungs.    SONY RAJPUT DO; Resident Radiologist  This document has been electronically signed.  COREY RANDALL; Attending Radiologist  This document has been electronically signed. Sep 11 2021 10:37AM

## 2021-09-11 NOTE — PROGRESS NOTE ADULT - ASSESSMENT
40 yo M with history of cardiac murmur, presented with orthopnea, fever and newly diagnosed severe MR with signs of CHF on exam.  Also suffered asymptomatic COVID infection.  No epicardial CAD on cath. No evidence of  endocarditis.  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  Now s/p mitral valve reconstruction with resection of P2, reconstruction of P1 and P3, annuloplasty band utilizing a 28 Odalis annuloplasty band by Dr. Rodrigues on Sept. 10.        REC:

## 2021-09-11 NOTE — PROGRESS NOTE ADULT - SUBJECTIVE AND OBJECTIVE BOX
CRITICAL CARE ATTENDING - CTICU    MEDICATIONS  (STANDING):  albumin human  5% IVPB 250 milliLiter(s) IV Intermittent once  aspirin enteric coated 81 milliGRAM(s) Oral daily  cefuroxime  IVPB 1500 milliGRAM(s) IV Intermittent every 8 hours  chlorhexidine 2% Cloths 1 Application(s) Topical <User Schedule>  enoxaparin Injectable 40 milliGRAM(s) SubCutaneous daily  insulin lispro (ADMELOG) corrective regimen sliding scale   SubCutaneous three times a day before meals  insulin lispro (ADMELOG) corrective regimen sliding scale   SubCutaneous at bedtime  metoclopramide Injectable 10 milliGRAM(s) IV Push every 8 hours  pantoprazole    Tablet 40 milliGRAM(s) Oral before breakfast  polyethylene glycol 3350 17 Gram(s) Oral daily  sodium chloride 0.9%. 1000 milliLiter(s) (10 mL/Hr) IV Continuous <Continuous>  vasopressin Infusion 0.06 Unit(s)/Min (3.6 mL/Hr) IV Continuous <Continuous>                                    9.4    13.51 )-----------( 179      ( 11 Sep 2021 00:39 )             28.6       09-11    137  |  101  |  19  ----------------------------<  113<H>  4.5   |  23  |  1.02    Ca    8.7      11 Sep 2021 00:39  Phos  4.3     09-11  Mg     2.5     09-11    TPro  5.5<L>  /  Alb  3.8  /  TBili  0.4  /  DBili  x   /  AST  35  /  ALT  13  /  AlkPhos  37<L>  09-11      PT/INR - ( 10 Sep 2021 13:08 )   PT: 13.1 sec;   INR: 1.10 ratio         PTT - ( 10 Sep 2021 13:08 )  PTT:28.0 sec    Mode: CPAP with PS  FiO2: 40  PEEP: 5  PS: 5  MAP: 8  PIP: 12      Daily     Daily       09-10 @ 07:01  -  09-11 @ 07:00  --------------------------------------------------------  IN: 4003.6 mL / OUT: 1462 mL / NET: 2541.6 mL    09-11 @ 07:01  -  09-11 @ 11:42  --------------------------------------------------------  IN: 453 mL / OUT: 305 mL / NET: 148 mL        Critically Ill patient  : [ ] preoperative ,   [ x] post operative    Requires :  [x Arterial Line   [x ] Central Line  [ ] PA catheter  [ ] IABP  [ ] ECMO  [ ] LVAD  [ ] Ventilator  [x ] pacemaker - TPM [ ] Impella.                      [x ] ABG's     [x ] Pulse Oxymetry Monitoring  Bedside evaluation , monitoring , treatment of hemodynamics , fluids , IVP/ IVCD meds.        Diagnosis:     POD 1 -  MV Ring / repair     Hypotension    Hypovolemia    Hemodynamic lability,  instability. Requires IVCD [x ] vasopressors [ ] inotropes  [ ] vasodilator  [x ]IVSS fluid  to maintain MAP, perfusion, C.I.     Chest Tube Drainage     Temporary pacemaker (TPM) interrogation and setting.     CHF- acute [ x]   chronic [x ]    systolic [ ]   diatolic [ ]          - Echo- EF -   MR        [ ] RV dysfunction          - Cxr-cardiomegally, edema          - Clinical-  [ ]inotropes   [ x]pressors   [ ]diuresis   [ ]IABP   [ ]ECMO   [ ]LVAD   [ ]Respiratory Failure    Tachycardia    Pain management     Requires bedside physical therapy, mobilization and total alf care.     Recent COVID 19 infection                         -                     Discussed with CT surgeon, Physician's Assistant - Nurse Practitioner- Critical care medicine team.   Discussed at  AM / PM rounds.   Chart, labs , films reviewed.    Cumulative Critical Care Time Given Today : 30 min

## 2021-09-11 NOTE — PHYSICAL THERAPY INITIAL EVALUATION ADULT - PERTINENT HX OF CURRENT PROBLEM, REHAB EVAL
40 yo M with history of cardiac murmur, presented with orthopnea, fever and newly diagnosed severe MR with signs of CHF on exam.  Also hx of asymptomatic COVID infection.  No epicardial CAD on cath. No evidence of  endocarditis.  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

## 2021-09-11 NOTE — CONSULT NOTE ADULT - ASSESSMENT
40 yo M with history of cardiac murmur, presented with orthopnea, fever and newly diagnosed severe MR with signs of CHF on exam.  Also suffered asymptomatic COVID infection.  No epicardial CAD on cath. No evidence of  endocarditis.  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  Now s/p mitral valve reconstruction with resection of P2, reconstruction of P1 and P3, annuloplasty band utilizing a 28 Odalis annuloplasty band by Dr. Rodrigues on Sept. 10; s/p atrial appendage clip.      REC:  1.  MVP with torn chordae and severe MR, s/p repair and atrial appendage clip  - good progress 24 hours post op  - continue IV albumin and vasopressin for BP support  - OOB as tolerated  - incentive spirometer/pulm toilet      Star De Luna M.D.  (covering for Dr. Lisker)  Office: [phone]

## 2021-09-12 LAB
ALBUMIN SERPL ELPH-MCNC: 3.5 G/DL — SIGNIFICANT CHANGE UP (ref 3.3–5)
ALP SERPL-CCNC: 33 U/L — LOW (ref 40–120)
ALT FLD-CCNC: 12 U/L — SIGNIFICANT CHANGE UP (ref 10–45)
ANION GAP SERPL CALC-SCNC: 9 MMOL/L — SIGNIFICANT CHANGE UP (ref 5–17)
AST SERPL-CCNC: 26 U/L — SIGNIFICANT CHANGE UP (ref 10–40)
BASE EXCESS BLDV CALC-SCNC: 4.2 MMOL/L — HIGH (ref -2–2)
BASOPHILS # BLD AUTO: 0.04 K/UL — SIGNIFICANT CHANGE UP (ref 0–0.2)
BASOPHILS NFR BLD AUTO: 0.3 % — SIGNIFICANT CHANGE UP (ref 0–2)
BILIRUB SERPL-MCNC: 0.4 MG/DL — SIGNIFICANT CHANGE UP (ref 0.2–1.2)
BUN SERPL-MCNC: 15 MG/DL — SIGNIFICANT CHANGE UP (ref 7–23)
CALCIUM SERPL-MCNC: 8.1 MG/DL — LOW (ref 8.4–10.5)
CHLORIDE SERPL-SCNC: 98 MMOL/L — SIGNIFICANT CHANGE UP (ref 96–108)
CO2 BLDV-SCNC: 33 MMOL/L — HIGH (ref 22–26)
CO2 SERPL-SCNC: 26 MMOL/L — SIGNIFICANT CHANGE UP (ref 22–31)
CREAT SERPL-MCNC: 0.87 MG/DL — SIGNIFICANT CHANGE UP (ref 0.5–1.3)
EOSINOPHIL # BLD AUTO: 0.06 K/UL — SIGNIFICANT CHANGE UP (ref 0–0.5)
EOSINOPHIL NFR BLD AUTO: 0.5 % — SIGNIFICANT CHANGE UP (ref 0–6)
GAS PNL BLDV: SIGNIFICANT CHANGE UP
GLUCOSE SERPL-MCNC: 99 MG/DL — SIGNIFICANT CHANGE UP (ref 70–99)
HCO3 BLDV-SCNC: 31 MMOL/L — HIGH (ref 22–29)
HCT VFR BLD CALC: 26.2 % — LOW (ref 39–50)
HGB BLD-MCNC: 8.5 G/DL — LOW (ref 13–17)
HOROWITZ INDEX BLDV+IHG-RTO: 28 — SIGNIFICANT CHANGE UP
IMM GRANULOCYTES NFR BLD AUTO: 0.3 % — SIGNIFICANT CHANGE UP (ref 0–1.5)
LACTATE BLDV-MCNC: 0.6 MMOL/L — LOW (ref 0.7–2)
LYMPHOCYTES # BLD AUTO: 1.21 K/UL — SIGNIFICANT CHANGE UP (ref 1–3.3)
LYMPHOCYTES # BLD AUTO: 10.1 % — LOW (ref 13–44)
MAGNESIUM SERPL-MCNC: 2.3 MG/DL — SIGNIFICANT CHANGE UP (ref 1.6–2.6)
MCHC RBC-ENTMCNC: 29 PG — SIGNIFICANT CHANGE UP (ref 27–34)
MCHC RBC-ENTMCNC: 32.4 GM/DL — SIGNIFICANT CHANGE UP (ref 32–36)
MCV RBC AUTO: 89.4 FL — SIGNIFICANT CHANGE UP (ref 80–100)
MONOCYTES # BLD AUTO: 0.94 K/UL — HIGH (ref 0–0.9)
MONOCYTES NFR BLD AUTO: 7.9 % — SIGNIFICANT CHANGE UP (ref 2–14)
NEUTROPHILS # BLD AUTO: 9.65 K/UL — HIGH (ref 1.8–7.4)
NEUTROPHILS NFR BLD AUTO: 80.9 % — HIGH (ref 43–77)
NRBC # BLD: 0 /100 WBCS — SIGNIFICANT CHANGE UP (ref 0–0)
PCO2 BLDV: 55 MMHG — SIGNIFICANT CHANGE UP (ref 42–55)
PH BLDV: 7.36 — SIGNIFICANT CHANGE UP (ref 7.32–7.43)
PHOSPHATE SERPL-MCNC: 2.7 MG/DL — SIGNIFICANT CHANGE UP (ref 2.5–4.5)
PLATELET # BLD AUTO: 144 K/UL — LOW (ref 150–400)
PO2 BLDV: 39 MMHG — SIGNIFICANT CHANGE UP (ref 25–45)
POTASSIUM SERPL-MCNC: 4.2 MMOL/L — SIGNIFICANT CHANGE UP (ref 3.5–5.3)
POTASSIUM SERPL-SCNC: 4.2 MMOL/L — SIGNIFICANT CHANGE UP (ref 3.5–5.3)
PROT SERPL-MCNC: 5.1 G/DL — LOW (ref 6–8.3)
RBC # BLD: 2.93 M/UL — LOW (ref 4.2–5.8)
RBC # FLD: 11.9 % — SIGNIFICANT CHANGE UP (ref 10.3–14.5)
SAO2 % BLDV: 76.9 % — SIGNIFICANT CHANGE UP (ref 67–88)
SODIUM SERPL-SCNC: 133 MMOL/L — LOW (ref 135–145)
WBC # BLD: 11.94 K/UL — HIGH (ref 3.8–10.5)
WBC # FLD AUTO: 11.94 K/UL — HIGH (ref 3.8–10.5)

## 2021-09-12 PROCEDURE — 99291 CRITICAL CARE FIRST HOUR: CPT

## 2021-09-12 PROCEDURE — 71045 X-RAY EXAM CHEST 1 VIEW: CPT | Mod: 26

## 2021-09-12 PROCEDURE — 93010 ELECTROCARDIOGRAM REPORT: CPT

## 2021-09-12 PROCEDURE — 99233 SBSQ HOSP IP/OBS HIGH 50: CPT | Mod: GC

## 2021-09-12 RX ORDER — METOPROLOL TARTRATE 50 MG
12.5 TABLET ORAL EVERY 12 HOURS
Refills: 0 | Status: DISCONTINUED | OUTPATIENT
Start: 2021-09-12 | End: 2021-09-13

## 2021-09-12 RX ORDER — SPIRONOLACTONE 25 MG/1
25 TABLET, FILM COATED ORAL DAILY
Refills: 0 | Status: DISCONTINUED | OUTPATIENT
Start: 2021-09-12 | End: 2021-09-12

## 2021-09-12 RX ORDER — FUROSEMIDE 40 MG
20 TABLET ORAL ONCE
Refills: 0 | Status: COMPLETED | OUTPATIENT
Start: 2021-09-12 | End: 2021-09-12

## 2021-09-12 RX ORDER — FUROSEMIDE 40 MG
20 TABLET ORAL
Refills: 0 | Status: DISCONTINUED | OUTPATIENT
Start: 2021-09-12 | End: 2021-09-15

## 2021-09-12 RX ORDER — ACETAMINOPHEN 500 MG
1000 TABLET ORAL ONCE
Refills: 0 | Status: COMPLETED | OUTPATIENT
Start: 2021-09-12 | End: 2021-09-12

## 2021-09-12 RX ORDER — SPIRONOLACTONE 25 MG/1
25 TABLET, FILM COATED ORAL
Refills: 0 | Status: DISCONTINUED | OUTPATIENT
Start: 2021-09-12 | End: 2021-09-15

## 2021-09-12 RX ORDER — SODIUM CHLORIDE 9 MG/ML
1000 INJECTION INTRAMUSCULAR; INTRAVENOUS; SUBCUTANEOUS
Refills: 0 | Status: DISCONTINUED | OUTPATIENT
Start: 2021-09-12 | End: 2021-09-13

## 2021-09-12 RX ADMIN — Medication 0.6 MILLIGRAM(S): at 06:04

## 2021-09-12 RX ADMIN — HYDROMORPHONE HYDROCHLORIDE 30 MILLILITER(S): 2 INJECTION INTRAMUSCULAR; INTRAVENOUS; SUBCUTANEOUS at 07:08

## 2021-09-12 RX ADMIN — Medication 10 MILLIGRAM(S): at 16:19

## 2021-09-12 RX ADMIN — POLYETHYLENE GLYCOL 3350 17 GRAM(S): 17 POWDER, FOR SOLUTION ORAL at 11:02

## 2021-09-12 RX ADMIN — PANTOPRAZOLE SODIUM 40 MILLIGRAM(S): 20 TABLET, DELAYED RELEASE ORAL at 06:04

## 2021-09-12 RX ADMIN — CHLORHEXIDINE GLUCONATE 1 APPLICATION(S): 213 SOLUTION TOPICAL at 06:05

## 2021-09-12 RX ADMIN — Medication 1000 MILLIGRAM(S): at 07:00

## 2021-09-12 RX ADMIN — ENOXAPARIN SODIUM 40 MILLIGRAM(S): 100 INJECTION SUBCUTANEOUS at 11:02

## 2021-09-12 RX ADMIN — HYDROMORPHONE HYDROCHLORIDE 30 MILLILITER(S): 2 INJECTION INTRAMUSCULAR; INTRAVENOUS; SUBCUTANEOUS at 19:10

## 2021-09-12 RX ADMIN — Medication 400 MILLIGRAM(S): at 06:29

## 2021-09-12 RX ADMIN — Medication 81 MILLIGRAM(S): at 11:03

## 2021-09-12 RX ADMIN — Medication 20 MILLIGRAM(S): at 19:41

## 2021-09-12 RX ADMIN — Medication 20 MILLIGRAM(S): at 06:04

## 2021-09-12 RX ADMIN — SPIRONOLACTONE 25 MILLIGRAM(S): 25 TABLET, FILM COATED ORAL at 21:28

## 2021-09-12 RX ADMIN — Medication 100 MILLIGRAM(S): at 00:48

## 2021-09-12 RX ADMIN — SPIRONOLACTONE 25 MILLIGRAM(S): 25 TABLET, FILM COATED ORAL at 06:05

## 2021-09-12 RX ADMIN — Medication 12.5 MILLIGRAM(S): at 18:05

## 2021-09-12 RX ADMIN — Medication 10 MILLIGRAM(S): at 06:04

## 2021-09-12 RX ADMIN — Medication 0.6 MILLIGRAM(S): at 18:05

## 2021-09-12 NOTE — PROGRESS NOTE ADULT - SUBJECTIVE AND OBJECTIVE BOX
CRITICAL CARE ATTENDING - CTICU    MEDICATIONS  (STANDING):  albumin human  5% IVPB 250 milliLiter(s) IV Intermittent once  aspirin enteric coated 81 milliGRAM(s) Oral daily  chlorhexidine 2% Cloths 1 Application(s) Topical <User Schedule>  colchicine 0.6 milliGRAM(s) Oral every 12 hours  enoxaparin Injectable 40 milliGRAM(s) SubCutaneous daily  HYDROmorphone PCA (1 mG/mL) 30 milliLiter(s) PCA Continuous PCA Continuous  insulin lispro (ADMELOG) corrective regimen sliding scale   SubCutaneous three times a day before meals  insulin lispro (ADMELOG) corrective regimen sliding scale   SubCutaneous at bedtime  metoclopramide Injectable 10 milliGRAM(s) IV Push every 8 hours  pantoprazole    Tablet 40 milliGRAM(s) Oral before breakfast  polyethylene glycol 3350 17 Gram(s) Oral daily  sodium chloride 0.9%. 1000 milliLiter(s) (10 mL/Hr) IV Continuous <Continuous>  sodium chloride 0.9%. 1000 milliLiter(s) (30 mL/Hr) IV Continuous <Continuous>  spironolactone 25 milliGRAM(s) Oral daily                                    8.5    11.94 )-----------( 144      ( 12 Sep 2021 00:28 )             26.2       09-12    133<L>  |  98  |  15  ----------------------------<  99  4.2   |  26  |  0.87    Ca    8.1<L>      12 Sep 2021 00:28  Phos  2.7     12  Mg     2.3     12    TPro  5.1<L>  /  Alb  3.5  /  TBili  0.4  /  DBili  x   /  AST  26  /  ALT  12  /  AlkPhos  33<L>  12      PT/INR - ( 10 Sep 2021 13:08 )   PT: 13.1 sec;   INR: 1.10 ratio         PTT - ( 10 Sep 2021 13:08 )  PTT:28.0 sec        Daily     Daily Weight in k.1 (12 Sep 2021 00:00)       @ 07:01  -   @ 07:00  --------------------------------------------------------  IN: 1982.1 mL / OUT: 2495 mL / NET: -512.9 mL        Critically Ill patient  : [ ] preoperative ,   [ x] post operative    Requires :  [x ] Arterial Line   [x ] Central Line  [ ] PA catheter  [ ] IABP  [ ] ECMO  [ ] LVAD  [ ] Ventilator  [x ] pacemaker - TPM [ ] Impella.                      [x ] ABG's     [x ] Pulse Oxymetry Monitoring  Bedside evaluation , monitoring , treatment of hemodynamics , fluids , IVP/ IVCD meds.        Diagnosis:     POD 2 -  MV Ring / repair     Hypovolemia    Chest Tube Drainage     Requires chest PT, pulmonary toilet, suctioning to maintain SaO2,  patent airway and treat atelectasis.     Temporary pacemaker (TPM) interrogation and setting.     CHF- acute [ x]   chronic [x ]    systolic [ ]   diatolic [ ]          - Echo- EF -   MR        [ ] RV dysfunction          - Cxr-cardiomegally, edema          - Clinical-  [ ]inotropes   [ ]pressors   [ x]diuresis   [ ]IABP   [ ]ECMO   [ ]LVAD   [ ]Respiratory Failure    Fluid overload     Tachycardia    Pain management     Requires bedside physical therapy, mobilization and total [facility] care.     Recent COVID 19 infection     Hyponatremia     Thrombocytopenia                     By signing my name below, I, Jessica No, attest that this documentation has been prepared under the direction and in the presence of Shon Dash MD.   Electronically Signed: Jarrod Carbajal 21 @ 07:54      Discussed with CT surgeon, Physician Assistant - Nurse Practitioner- Critical care medicine team.   Discussed at  AM / PM rounds.   Chart, labs , films reviewed.    Cumulative Critical Care Time Given Today:  CRITICAL CARE ATTENDING - CTICU    MEDICATIONS  (STANDING):  albumin human  5% IVPB 250 milliLiter(s) IV Intermittent once  aspirin enteric coated 81 milliGRAM(s) Oral daily  chlorhexidine 2% Cloths 1 Application(s) Topical <User Schedule>  colchicine 0.6 milliGRAM(s) Oral every 12 hours  enoxaparin Injectable 40 milliGRAM(s) SubCutaneous daily  HYDROmorphone PCA (1 mG/mL) 30 milliLiter(s) PCA Continuous PCA Continuous  insulin lispro (ADMELOG) corrective regimen sliding scale   SubCutaneous three times a day before meals  insulin lispro (ADMELOG) corrective regimen sliding scale   SubCutaneous at bedtime  metoclopramide Injectable 10 milliGRAM(s) IV Push every 8 hours  pantoprazole    Tablet 40 milliGRAM(s) Oral before breakfast  polyethylene glycol 3350 17 Gram(s) Oral daily  sodium chloride 0.9%. 1000 milliLiter(s) (10 mL/Hr) IV Continuous <Continuous>  sodium chloride 0.9%. 1000 milliLiter(s) (30 mL/Hr) IV Continuous <Continuous>  spironolactone 25 milliGRAM(s) Oral daily                                    8.5    11.94 )-----------( 144      ( 12 Sep 2021 00:28 )             26.2       09-12    133<L>  |  98  |  15  ----------------------------<  99  4.2   |  26  |  0.87    Ca    8.1<L>      12 Sep 2021 00:28  Phos  2.7     12  Mg     2.3     12    TPro  5.1<L>  /  Alb  3.5  /  TBili  0.4  /  DBili  x   /  AST  26  /  ALT  12  /  AlkPhos  33<L>  12      PT/INR - ( 10 Sep 2021 13:08 )   PT: 13.1 sec;   INR: 1.10 ratio         PTT - ( 10 Sep 2021 13:08 )  PTT:28.0 sec        Daily     Daily Weight in k.1 (12 Sep 2021 00:00)       @ 07:01  -   @ 07:00  --------------------------------------------------------  IN: 1982.1 mL / OUT: 2495 mL / NET: -512.9 mL        Critically Ill patient  : [ ] preoperative ,   [ x] post operative    Requires :  [x ] Arterial Line   [x ] Central Line  [ ] PA catheter  [ ] IABP  [ ] ECMO  [ ] LVAD  [ ] Ventilator  [x ] pacemaker - TPM [ ] Impella.                      [x ] ABG's     [x ] Pulse Oxymetry Monitoring  Bedside evaluation , monitoring , treatment of hemodynamics , fluids , IVP/ IVCD meds.        Diagnosis:     POD 2 -  MV Ring / repair     Hypotension - resolved    Hypovolemia    Chest Tube Drainage     Requires chest PT, pulmonary toilet, suctioning to maintain SaO2,  patent airway and treat atelectasis.     Temporary pacemaker (TPM) interrogation and setting.     CHF- acute [ x]   chronic [x ]    systolic [ ]   diatolic [ ]          - Echo- EF -   MR        [ ] RV dysfunction          - Cxr-cardiomegally, edema          - Clinical-  [ ]inotropes   [ ]pressors   [ x]diuresis   [ ]IABP   [ ]ECMO   [ ]LVAD   [ ]Respiratory Failure    Fluid overload     Tachycardia    Pain management - PCA    Requires bedside physical therapy, mobilization and total [facility] care.     Recent COVID 19 infection     Hyponatremia     Thrombocytopenia     ECG                    By signing my name below, I, Jessica No, attest that this documentation has been prepared under the direction and in the presence of Shon Dash MD.   Electronically Signed: Jarrod Carbajal 21 @ 07:54    I, Shon Dash, personally performed the services described in this documentation. All medical record entries made by the scribe were at my direction and in my presence. I have reviewed the chart and agree that the record reflects my personal performance and is accurate and complete.   Shon Dash MD.       Discussed with CT surgeon, Physician Assistant - Nurse Practitioner- Critical care medicine team.   Discussed at  AM / PM rounds.   Chart, labs , films reviewed.    Cumulative Critical Care Time Given Today: 30 min

## 2021-09-12 NOTE — PROGRESS NOTE ADULT - ASSESSMENT
42 yo M with history of cardiac murmur, presented with orthopnea, fever and newly diagnosed severe MR with signs of CHF on exam.  Also suffered asymptomatic COVID infection.  No epicardial CAD on cath. No evidence of  endocarditis.  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      Now s/p mitral valve reconstruction on Sept. 10 with resection of P2, reconstruction of P1 and P3, annuloplasty band utilizing a 28 Odalis annuloplasty band by Dr. Rodrigues on Sept. 10; s/p atrial appendage clip.      REC:  1.  MVP with torn chordae and severe MR, s/p repair and atrial appendage clip  - continues good progress day 2 post op  - Off IV meds at this time; BP remains stable.  - OOB as tolerated  - continue incentive spirometer/pulm toilet      Star De Luna M.D.  (covering for Dr. Lisker)  Office: [phone]

## 2021-09-12 NOTE — PROGRESS NOTE ADULT - SUBJECTIVE AND OBJECTIVE BOX
Medications:  albumin human  5% IVPB 250 milliLiter(s) IV Intermittent once  aspirin enteric coated 81 milliGRAM(s) Oral daily  chlorhexidine 2% Cloths 1 Application(s) Topical <User Schedule>  colchicine 0.6 milliGRAM(s) Oral every 12 hours  enoxaparin Injectable 40 milliGRAM(s) SubCutaneous daily  HYDROmorphone PCA (1 mG/mL) 30 milliLiter(s) PCA Continuous PCA Continuous  HYDROmorphone PCA (1 mG/mL) Rescue Clinician Bolus 0.5 milliGRAM(s) IV Push every 15 minutes PRN  insulin lispro (ADMELOG) corrective regimen sliding scale   SubCutaneous three times a day before meals  insulin lispro (ADMELOG) corrective regimen sliding scale   SubCutaneous at bedtime  metoclopramide Injectable 10 milliGRAM(s) IV Push every 8 hours  nalbuphine Injectable 2.5 milliGRAM(s) IV Push every 6 hours PRN  naloxone Injectable 0.1 milliGRAM(s) IV Push every 3 minutes PRN  ondansetron Injectable 4 milliGRAM(s) IV Push every 6 hours PRN  oxycodone    5 mG/acetaminophen 325 mG 1 Tablet(s) Oral every 4 hours PRN  oxycodone    5 mG/acetaminophen 325 mG 2 Tablet(s) Oral every 4 hours PRN  pantoprazole    Tablet 40 milliGRAM(s) Oral before breakfast  polyethylene glycol 3350 17 Gram(s) Oral daily  sodium chloride 0.9%. 1000 milliLiter(s) IV Continuous <Continuous>  sodium chloride 0.9%. 1000 milliLiter(s) IV Continuous <Continuous>  spironolactone 25 milliGRAM(s) Oral daily    PMH/PSH/FH/SH: [ ] Unchanged        Vitals:  T(C): 37.9 (21 @ 08:00), Max: 38.4 (21 @ 05:45)  HR: 91 (21 @ 11:00) (79 - 102)  BP: 98/60 (21 @ 11:00) (91/53 - 127/75)  BP(mean): 71 (21 @ 11:00) (65 - 96)  RR: 20 (21 @ 11:00) (9 - 33)  SpO2: 96% (21 @ 11:00) (92% - 100%)  Daily Weight in k.1 (12 Sep 2021 00:00)                  Monitor:   NSR at 93 bpm      I&O's Summary  11 Sep 2021 07:01  -  12 Sep 2021 07:00  --------------------------------------------------------  IN: 1982.1 mL / OUT: 2495 mL / NET: -512.9 mL    12 Sep 2021 07:01  -  12 Sep 2021 11:42  --------------------------------------------------------  IN: 640 mL / OUT: 610 mL / NET: 30 mL      Labs:                      8.5    11.94 )-----------( 144      ( 12 Sep 2021 00:28 )             26.2     09-12  133<L>  |  98  |  15  ----------------------------<  99  4.2   |  26  |  0.87    Ca    8.1<L>      12 Sep 2021 00:28  Phos  2.7     09-12  Mg     2.3     09-12    TPro  5.1<L>  /  Alb  3.5  /  TBili  0.4  /  DBili  x   /  AST  26  /  ALT  12  /  AlkPhos  33<L>  09-12    PT/INR - ( 10 Sep 2021 13:08 )   PT: 13.1 sec;   INR: 1.10 ratio    PTT - ( 10 Sep 2021 13:08 )  PTT:28.0 sec    CARDIAC MARKERS ( 12 Sep 2021 00:28 )  x     / x     / 359 U/L / x     / 10.1 ng/mL  CARDIAC MARKERS ( 11 Sep 2021 14:04 )  x     / x     / 402 U/L / x     / 15.4 ng/mL  CARDIAC MARKERS ( 10 Sep 2021 13:07 )  x     / x     / 303 U/L / x     / 30.3 ng/mL      ECG:       OOB to chair.  Alert, no distress.  Mild incisional pain; no palps or dyspnea.  Fair appetite.  Walked earlier without problem.      Medications:  albumin human  5% IVPB 250 milliLiter(s) IV Intermittent once  aspirin enteric coated 81 milliGRAM(s) Oral daily  chlorhexidine 2% Cloths 1 Application(s) Topical <User Schedule>  colchicine 0.6 milliGRAM(s) Oral every 12 hours  enoxaparin Injectable 40 milliGRAM(s) SubCutaneous daily  HYDROmorphone PCA (1 mG/mL) 30 milliLiter(s) PCA Continuous PCA Continuous  HYDROmorphone PCA (1 mG/mL) Rescue Clinician Bolus 0.5 milliGRAM(s) IV Push every 15 minutes PRN  insulin lispro (ADMELOG) corrective regimen sliding scale   SubCutaneous three times a day before meals  insulin lispro (ADMELOG) corrective regimen sliding scale   SubCutaneous at bedtime  metoclopramide Injectable 10 milliGRAM(s) IV Push every 8 hours  nalbuphine Injectable 2.5 milliGRAM(s) IV Push every 6 hours PRN  naloxone Injectable 0.1 milliGRAM(s) IV Push every 3 minutes PRN  ondansetron Injectable 4 milliGRAM(s) IV Push every 6 hours PRN  oxycodone    5 mG/acetaminophen 325 mG 1 Tablet(s) Oral every 4 hours PRN  oxycodone    5 mG/acetaminophen 325 mG 2 Tablet(s) Oral every 4 hours PRN  pantoprazole    Tablet 40 milliGRAM(s) Oral before breakfast  polyethylene glycol 3350 17 Gram(s) Oral daily  sodium chloride 0.9%. 1000 milliLiter(s) IV Continuous <Continuous>  sodium chloride 0.9%. 1000 milliLiter(s) IV Continuous <Continuous>  spironolactone 25 milliGRAM(s) Oral daily    PMH/PSH/FH/SH: [ ] Unchanged    ROS:  Unchanged    Vitals:  T(C): 37.9 (21 @ 08:00), Max: 38.4 (21 @ 05:45)  HR: 91 (21 @ 11:00) (79 - 102)  BP: 98/60 (21 @ 11:00) (91/53 - 127/75)  BP(mean): 71 (21 @ 11:00) (65 - 96)  RR: 20 (21 @ 11:00) (9 - 33)  SpO2: 96% (21 @ 11:00) (92% - 100%)  Daily Weight in k.1 (12 Sep 2021 00:00)      EXAM:  GENERAL:  Alert, no distress  HEENT: Normocephalic, EOM intact, PERRLA.    NECK: Normal carotid upstrokes, no bruit; No JVD.  Central line in place.  CHEST:  Clear to auscultation  HEART: PMI not displaced. Normal S1 and S2.  + friction rub; no gallop.  ABDOMEN: Normal bowel sounds, no bruit. Soft, non-tender.  Liver and spleen not palpable; aorta not palpable.  EXT:  No edema, no varicosities, 2+ pulses in upper and lower extremities.  PSYCH:  A&Ox3, normal insight, no anxiety  NEURO: Non-focal  SKIN:  No rash       Monitor:    NSR at 93 bpm      I&O's Summary  11 Sep 2021 07:01  -  12 Sep 2021 07:00  --------------------------------------------------------  IN: 1982.1 mL / OUT: 2495 mL / NET: -512.9 mL    12 Sep 2021 07:01  -  12 Sep 2021 11:42  --------------------------------------------------------  IN: 640 mL / OUT: 610 mL / NET: 30 mL      Labs:                      8.5    11.94 )-----------( 144      ( 12 Sep 2021 00:28 )             26.2     12  133<L>  |  98  |  15  ----------------------------<  99  4.2   |  26  |  0.87    Ca    8.1<L>      12 Sep 2021 00:28  Phos  2.7     12  Mg     2.3     09-12    TPro  5.1<L>  /  Alb  3.5  /  TBili  0.4  /  DBili  x   /  AST  26  /  ALT  12  /  AlkPhos  33<L>      PT/INR - ( 10 Sep 2021 13:08 )   PT: 13.1 sec;   INR: 1.10 ratio    PTT - ( 10 Sep 2021 13:08 )  PTT:28.0 sec    CARDIAC MARKERS ( 12 Sep 2021 00:28 )  x     / x     / 359 U/L / x     / 10.1 ng/mL  CARDIAC MARKERS ( 11 Sep 2021 14:04 )  x     / x     / 402 U/L / x     / 15.4 ng/mL  CARDIAC MARKERS ( 10 Sep 2021 13:07 )  x     / x     / 303 U/L / x     / 30.3 ng/mL

## 2021-09-12 NOTE — PROGRESS NOTE ADULT - SUBJECTIVE AND OBJECTIVE BOX
Day __ of Anesthesia Pain Management Service    SUBJECTIVE: Patient is doing well with IV PCA    Pain Scale Score:	[X] Refer to charted pain scores    THERAPY:    [ ] IV PCA Morphine		[ ] 5 mg/mL	[ ] 1 mg/mL  [X] IV PCA Hydromorphone	[ ] 5 mg/mL	[X] 1 mg/mL  [ ] IV PCA Fentanyl		[ ] 50 micrograms/mL    Demand dose: 0.2 mg     Lockout: 6 minutes   Continuous Rate: 0 mg/hr  4 Hour Limit: 4 mg    MEDICATIONS  (STANDING):  albumin human  5% IVPB 250 milliLiter(s) IV Intermittent once  aspirin enteric coated 81 milliGRAM(s) Oral daily  chlorhexidine 2% Cloths 1 Application(s) Topical <User Schedule>  colchicine 0.6 milliGRAM(s) Oral every 12 hours  enoxaparin Injectable 40 milliGRAM(s) SubCutaneous daily  HYDROmorphone PCA (1 mG/mL) 30 milliLiter(s) PCA Continuous PCA Continuous  insulin lispro (ADMELOG) corrective regimen sliding scale   SubCutaneous three times a day before meals  insulin lispro (ADMELOG) corrective regimen sliding scale   SubCutaneous at bedtime  metoclopramide Injectable 10 milliGRAM(s) IV Push every 8 hours  pantoprazole    Tablet 40 milliGRAM(s) Oral before breakfast  polyethylene glycol 3350 17 Gram(s) Oral daily  sodium chloride 0.9%. 1000 milliLiter(s) (10 mL/Hr) IV Continuous <Continuous>  sodium chloride 0.9%. 1000 milliLiter(s) (30 mL/Hr) IV Continuous <Continuous>  spironolactone 25 milliGRAM(s) Oral daily    MEDICATIONS  (PRN):  HYDROmorphone PCA (1 mG/mL) Rescue Clinician Bolus 0.5 milliGRAM(s) IV Push every 15 minutes PRN for Pain Scale GREATER THAN 6  nalbuphine Injectable 2.5 milliGRAM(s) IV Push every 6 hours PRN Pruritus  naloxone Injectable 0.1 milliGRAM(s) IV Push every 3 minutes PRN For ANY of the following changes in patient status:  A. RR LESS THAN 10 breaths per minute, B. Oxygen saturation LESS THAN 90%, C. Sedation score of 6  ondansetron Injectable 4 milliGRAM(s) IV Push every 6 hours PRN Nausea  oxycodone    5 mG/acetaminophen 325 mG 1 Tablet(s) Oral every 4 hours PRN Mild Pain (1 - 3)  oxycodone    5 mG/acetaminophen 325 mG 2 Tablet(s) Oral every 4 hours PRN Moderate Pain (4 - 6)      OBJECTIVE:    Sedation Score:	[ X] Alert	[ ] Drowsy 	[ ] Arousable	[ ] Asleep	[ ] Unresponsive    Side Effects:	[X ] None	[ ] Nausea	[ ] Vomiting	[ ] Pruritus  		[ ] Other:    Vital Signs Last 24 Hrs  T(C): 37.9 (12 Sep 2021 08:00), Max: 38.4 (12 Sep 2021 05:45)  T(F): 100.2 (12 Sep 2021 08:00), Max: 101.1 (12 Sep 2021 05:45)  HR: 91 (12 Sep 2021 08:00) (79 - 101)  BP: 102/59 (12 Sep 2021 08:00) (91/53 - 127/75)  BP(mean): 75 (12 Sep 2021 08:00) (65 - 96)  RR: 33 (12 Sep 2021 08:00) (9 - 33)  SpO2: 100% (12 Sep 2021 08:00) (92% - 100%)    ASSESSMENT/ PLAN    Therapy to  be:               [X] Continued   [ ] Discontinued   [ ] Changed to PRN Analgesics    Documentation and Verification of current medications:   [X] Done	[ ] Not done, not eligible    Comments:

## 2021-09-13 DIAGNOSIS — I97.0 POSTCARDIOTOMY SYNDROME: ICD-10-CM

## 2021-09-13 DIAGNOSIS — Z98.890 OTHER SPECIFIED POSTPROCEDURAL STATES: ICD-10-CM

## 2021-09-13 DIAGNOSIS — I50.31 ACUTE DIASTOLIC (CONGESTIVE) HEART FAILURE: ICD-10-CM

## 2021-09-13 DIAGNOSIS — I24.1 DRESSLER'S SYNDROME: ICD-10-CM

## 2021-09-13 LAB
ALBUMIN SERPL ELPH-MCNC: 3.2 G/DL — LOW (ref 3.3–5)
ALP SERPL-CCNC: 42 U/L — SIGNIFICANT CHANGE UP (ref 40–120)
ALT FLD-CCNC: 22 U/L — SIGNIFICANT CHANGE UP (ref 10–45)
ANION GAP SERPL CALC-SCNC: 9 MMOL/L — SIGNIFICANT CHANGE UP (ref 5–17)
AST SERPL-CCNC: 29 U/L — SIGNIFICANT CHANGE UP (ref 10–40)
BASOPHILS # BLD AUTO: 0.02 K/UL — SIGNIFICANT CHANGE UP (ref 0–0.2)
BASOPHILS NFR BLD AUTO: 0.2 % — SIGNIFICANT CHANGE UP (ref 0–2)
BILIRUB SERPL-MCNC: 0.2 MG/DL — SIGNIFICANT CHANGE UP (ref 0.2–1.2)
BUN SERPL-MCNC: 8 MG/DL — SIGNIFICANT CHANGE UP (ref 7–23)
CALCIUM SERPL-MCNC: 8.4 MG/DL — SIGNIFICANT CHANGE UP (ref 8.4–10.5)
CHLORIDE SERPL-SCNC: 100 MMOL/L — SIGNIFICANT CHANGE UP (ref 96–108)
CO2 SERPL-SCNC: 25 MMOL/L — SIGNIFICANT CHANGE UP (ref 22–31)
CREAT SERPL-MCNC: 0.76 MG/DL — SIGNIFICANT CHANGE UP (ref 0.5–1.3)
EOSINOPHIL # BLD AUTO: 0.12 K/UL — SIGNIFICANT CHANGE UP (ref 0–0.5)
EOSINOPHIL NFR BLD AUTO: 1.3 % — SIGNIFICANT CHANGE UP (ref 0–6)
GLUCOSE SERPL-MCNC: 98 MG/DL — SIGNIFICANT CHANGE UP (ref 70–99)
HCT VFR BLD CALC: 25.4 % — LOW (ref 39–50)
HGB BLD-MCNC: 8.3 G/DL — LOW (ref 13–17)
IMM GRANULOCYTES NFR BLD AUTO: 0.3 % — SIGNIFICANT CHANGE UP (ref 0–1.5)
LYMPHOCYTES # BLD AUTO: 1.36 K/UL — SIGNIFICANT CHANGE UP (ref 1–3.3)
LYMPHOCYTES # BLD AUTO: 14.8 % — SIGNIFICANT CHANGE UP (ref 13–44)
MAGNESIUM SERPL-MCNC: 2.3 MG/DL — SIGNIFICANT CHANGE UP (ref 1.6–2.6)
MCHC RBC-ENTMCNC: 28.9 PG — SIGNIFICANT CHANGE UP (ref 27–34)
MCHC RBC-ENTMCNC: 32.7 GM/DL — SIGNIFICANT CHANGE UP (ref 32–36)
MCV RBC AUTO: 88.5 FL — SIGNIFICANT CHANGE UP (ref 80–100)
MONOCYTES # BLD AUTO: 0.78 K/UL — SIGNIFICANT CHANGE UP (ref 0–0.9)
MONOCYTES NFR BLD AUTO: 8.5 % — SIGNIFICANT CHANGE UP (ref 2–14)
NEUTROPHILS # BLD AUTO: 6.85 K/UL — SIGNIFICANT CHANGE UP (ref 1.8–7.4)
NEUTROPHILS NFR BLD AUTO: 74.9 % — SIGNIFICANT CHANGE UP (ref 43–77)
NRBC # BLD: 0 /100 WBCS — SIGNIFICANT CHANGE UP (ref 0–0)
PHOSPHATE SERPL-MCNC: 1.9 MG/DL — LOW (ref 2.5–4.5)
PHOSPHATE SERPL-MCNC: 2.6 MG/DL — SIGNIFICANT CHANGE UP (ref 2.5–4.5)
PLATELET # BLD AUTO: 133 K/UL — LOW (ref 150–400)
POTASSIUM SERPL-MCNC: 4.4 MMOL/L — SIGNIFICANT CHANGE UP (ref 3.5–5.3)
POTASSIUM SERPL-SCNC: 4.4 MMOL/L — SIGNIFICANT CHANGE UP (ref 3.5–5.3)
PROT SERPL-MCNC: 5.3 G/DL — LOW (ref 6–8.3)
RBC # BLD: 2.87 M/UL — LOW (ref 4.2–5.8)
RBC # FLD: 11.9 % — SIGNIFICANT CHANGE UP (ref 10.3–14.5)
SODIUM SERPL-SCNC: 134 MMOL/L — LOW (ref 135–145)
WBC # BLD: 9.16 K/UL — SIGNIFICANT CHANGE UP (ref 3.8–10.5)
WBC # FLD AUTO: 9.16 K/UL — SIGNIFICANT CHANGE UP (ref 3.8–10.5)

## 2021-09-13 PROCEDURE — 99233 SBSQ HOSP IP/OBS HIGH 50: CPT

## 2021-09-13 PROCEDURE — 71045 X-RAY EXAM CHEST 1 VIEW: CPT | Mod: 26

## 2021-09-13 RX ORDER — SODIUM CHLORIDE 9 MG/ML
3 INJECTION INTRAMUSCULAR; INTRAVENOUS; SUBCUTANEOUS EVERY 8 HOURS
Refills: 0 | Status: DISCONTINUED | OUTPATIENT
Start: 2021-09-13 | End: 2021-09-15

## 2021-09-13 RX ORDER — ACETAMINOPHEN 500 MG
650 TABLET ORAL EVERY 6 HOURS
Refills: 0 | Status: COMPLETED | OUTPATIENT
Start: 2021-09-13 | End: 2021-09-15

## 2021-09-13 RX ORDER — METOPROLOL TARTRATE 50 MG
25 TABLET ORAL
Refills: 0 | Status: DISCONTINUED | OUTPATIENT
Start: 2021-09-13 | End: 2021-09-14

## 2021-09-13 RX ORDER — ACETAMINOPHEN 500 MG
1000 TABLET ORAL ONCE
Refills: 0 | Status: COMPLETED | OUTPATIENT
Start: 2021-09-13 | End: 2021-09-13

## 2021-09-13 RX ORDER — KETOROLAC TROMETHAMINE 30 MG/ML
30 SYRINGE (ML) INJECTION ONCE
Refills: 0 | Status: DISCONTINUED | OUTPATIENT
Start: 2021-09-13 | End: 2021-09-13

## 2021-09-13 RX ORDER — OXYCODONE HYDROCHLORIDE 5 MG/1
10 TABLET ORAL EVERY 4 HOURS
Refills: 0 | Status: DISCONTINUED | OUTPATIENT
Start: 2021-09-13 | End: 2021-09-15

## 2021-09-13 RX ORDER — OXYCODONE HYDROCHLORIDE 5 MG/1
5 TABLET ORAL EVERY 4 HOURS
Refills: 0 | Status: DISCONTINUED | OUTPATIENT
Start: 2021-09-13 | End: 2021-09-15

## 2021-09-13 RX ORDER — GABAPENTIN 400 MG/1
100 CAPSULE ORAL EVERY 8 HOURS
Refills: 0 | Status: DISCONTINUED | OUTPATIENT
Start: 2021-09-13 | End: 2021-09-15

## 2021-09-13 RX ADMIN — SPIRONOLACTONE 25 MILLIGRAM(S): 25 TABLET, FILM COATED ORAL at 17:09

## 2021-09-13 RX ADMIN — GABAPENTIN 100 MILLIGRAM(S): 400 CAPSULE ORAL at 22:25

## 2021-09-13 RX ADMIN — SPIRONOLACTONE 25 MILLIGRAM(S): 25 TABLET, FILM COATED ORAL at 05:09

## 2021-09-13 RX ADMIN — Medication 20 MILLIGRAM(S): at 17:08

## 2021-09-13 RX ADMIN — SODIUM CHLORIDE 3 MILLILITER(S): 9 INJECTION INTRAMUSCULAR; INTRAVENOUS; SUBCUTANEOUS at 12:46

## 2021-09-13 RX ADMIN — OXYCODONE HYDROCHLORIDE 5 MILLIGRAM(S): 5 TABLET ORAL at 20:45

## 2021-09-13 RX ADMIN — Medication 12.5 MILLIGRAM(S): at 05:10

## 2021-09-13 RX ADMIN — Medication 25 MILLIGRAM(S): at 17:09

## 2021-09-13 RX ADMIN — Medication 81 MILLIGRAM(S): at 11:14

## 2021-09-13 RX ADMIN — Medication 650 MILLIGRAM(S): at 17:07

## 2021-09-13 RX ADMIN — Medication 30 MILLIGRAM(S): at 09:46

## 2021-09-13 RX ADMIN — GABAPENTIN 100 MILLIGRAM(S): 400 CAPSULE ORAL at 09:46

## 2021-09-13 RX ADMIN — GABAPENTIN 100 MILLIGRAM(S): 400 CAPSULE ORAL at 17:08

## 2021-09-13 RX ADMIN — PANTOPRAZOLE SODIUM 40 MILLIGRAM(S): 20 TABLET, DELAYED RELEASE ORAL at 05:10

## 2021-09-13 RX ADMIN — SODIUM CHLORIDE 3 MILLILITER(S): 9 INJECTION INTRAMUSCULAR; INTRAVENOUS; SUBCUTANEOUS at 22:23

## 2021-09-13 RX ADMIN — HYDROMORPHONE HYDROCHLORIDE 30 MILLILITER(S): 2 INJECTION INTRAMUSCULAR; INTRAVENOUS; SUBCUTANEOUS at 07:18

## 2021-09-13 RX ADMIN — Medication 1000 MILLIGRAM(S): at 10:08

## 2021-09-13 RX ADMIN — ENOXAPARIN SODIUM 40 MILLIGRAM(S): 100 INJECTION SUBCUTANEOUS at 11:14

## 2021-09-13 RX ADMIN — Medication 0.6 MILLIGRAM(S): at 05:09

## 2021-09-13 RX ADMIN — OXYCODONE HYDROCHLORIDE 5 MILLIGRAM(S): 5 TABLET ORAL at 20:00

## 2021-09-13 RX ADMIN — Medication 20 MILLIGRAM(S): at 05:09

## 2021-09-13 RX ADMIN — Medication 62.5 MILLIMOLE(S): at 05:08

## 2021-09-13 RX ADMIN — Medication 650 MILLIGRAM(S): at 22:38

## 2021-09-13 RX ADMIN — Medication 650 MILLIGRAM(S): at 19:06

## 2021-09-13 RX ADMIN — Medication 400 MILLIGRAM(S): at 09:47

## 2021-09-13 RX ADMIN — Medication 30 MILLIGRAM(S): at 10:08

## 2021-09-13 RX ADMIN — Medication 650 MILLIGRAM(S): at 22:26

## 2021-09-13 RX ADMIN — Medication 0.6 MILLIGRAM(S): at 17:08

## 2021-09-13 NOTE — PROGRESS NOTE ADULT - PROBLEM SELECTOR PLAN 1
Continue with ASA 81 mg PO Daily.   Continue with Lopressor 25 mg PO BID.   Continue with Aldactone 25 mg PO daily   Continue on Lasix 20 mg PO BID   Continue on Colchicine 0.6 mg PO BID   Increase activity as tolerated.   Encourage Chest PT / Pulmonary toileting and Incentive spirometry every 1 hour x 10 while awake.   Continue with PUD and DVT prophylaxis.   Shower on POD #5.   D/C plan home once medically cleared   Plan of care discussed with attending

## 2021-09-13 NOTE — PROGRESS NOTE ADULT - SUBJECTIVE AND OBJECTIVE BOX
CRITICAL CARE ATTENDING - CTICU    MEDICATIONS  (STANDING):  aspirin enteric coated 81 milliGRAM(s) Oral daily  colchicine 0.6 milliGRAM(s) Oral every 12 hours  enoxaparin Injectable 40 milliGRAM(s) SubCutaneous daily  furosemide    Tablet 20 milliGRAM(s) Oral two times a day  HYDROmorphone PCA (1 mG/mL) 30 milliLiter(s) PCA Continuous PCA Continuous  metoprolol tartrate 25 milliGRAM(s) Oral two times a day  pantoprazole    Tablet 40 milliGRAM(s) Oral before breakfast  polyethylene glycol 3350 17 Gram(s) Oral daily  sodium chloride 0.9%. 1000 milliLiter(s) (30 mL/Hr) IV Continuous <Continuous>  spironolactone 25 milliGRAM(s) Oral two times a day                          8.3    9.16  )-----------( 133      ( 13 Sep 2021 00:57 )             25.4   09-13    134<L>  |  100  |  8   ----------------------------<  98  4.4   |  25  |  0.76    Ca    8.4      13 Sep 2021 00:58  Phos  2.6     09-13  Mg     2.3     09-13    TPro  5.3<L>  /  Alb  3.2<L>  /  TBili  0.2  /  DBili  x   /  AST  29  /  ALT  22  /  AlkPhos  42  09-13    I&O's Detail    12 Sep 2021 07:01  -  13 Sep 2021 07:00  --------------------------------------------------------  IN:    IV PiggyBack: 250 mL    Oral Fluid: 820 mL    sodium chloride 0.9%: 720 mL    sodium chloride 0.9%: 120 mL  Total IN: 1910 mL    OUT:    Chest Tube (mL): 320 mL    Indwelling Catheter - Urethral (mL): 605 mL    VAC (Vacuum Assisted Closure) System (mL): 27 mL    Voided (mL): 2030 mL  Total OUT: 2982 mL    Total NET: -1072 mL      13 Sep 2021 07:01  -  13 Sep 2021 09:07  --------------------------------------------------------  IN:    Oral Fluid: 240 mL    sodium chloride 0.9%: 30 mL  Total IN: 270 mL    OUT:  Total OUT: 0 mL    Total NET: 270 mL      Critically Ill patient  : [ ] preoperative ,   [ x] post operative    Requires :  [x ] Arterial Line   [x ] Central Line  [ ] PA catheter  [ ] IABP  [ ] ECMO  [ ] LVAD  [ ] Ventilator  [x ] pacemaker - TPM [ ] Impella.                      [x ] ABG's     [x ] Pulse Oxymetry Monitoring  Bedside evaluation , monitoring , treatment of hemodynamics , fluids , IVP/ IVCD meds.        Diagnosis:     POD 2 -  MV Ring / repair     Hypotension - resolved    Hypovolemia    Chest Tube Drainage     Requires chest PT, pulmonary toilet, suctioning to maintain SaO2,  patent airway and treat atelectasis.     Temporary pacemaker (TPM) interrogation and setting.     CHF- acute [ x]   chronic [x ]    systolic [ ]   diatolic [ ]          - Echo- EF -   MR        [ ] RV dysfunction          - Cxr-cardiomegally, edema          - Clinical-  [ ]inotropes   [ ]pressors   [ x]diuresis   [ ]IABP   [ ]ECMO   [ ]LVAD   [ ]Respiratory Failure    Fluid overload     Tachycardia    Pain management - PCA    Requires bedside physical therapy, mobilization and total [facility] care.     Recent COVID 19 infection     Hyponatremia     Thrombocytopenia     ECG    Discussed with CT surgeon, Physician Assistant - Nurse Practitioner- Critical care medicine team.   Discussed at  AM / PM rounds.   Chart, labs , films reviewed.    Cumulative Critical Care Time Given Today: 30 min

## 2021-09-13 NOTE — PROGRESS NOTE ADULT - ASSESSMENT
42 yo M with history of cardiac murmur, presented with orthopnea, fever and newly diagnosed severe MR with signs of CHF on exam.  Also suffered asymptomatic COVID infection.  No epicardial CAD on cath. No evidence of  endocarditis.  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      Now s/p mitral valve reconstruction on Sept. 10 with resection of P2, reconstruction of P1 and P3, annuloplasty band utilizing a 28 Odalis annuloplasty band by Dr. Rodrigues on Sept. 10; s/p atrial appendage clip.    Mild LV dysfunction on post-repair echo report.    REC:  1.  MVP with torn chordae and severe MR, s/p repair and atrial appendage clip  - continues good progress day 3 post op  - Off IV meds at this time; BP remains stable.  - OOB as tolerated  - continue incentive spirometer/pulm toilet    2. HFpEF: continue lasix/aldactone orally. Would transition to ACE/ARB for LV remodeling improvement once euvolemic.    3. Continue aspirin therapy.  4. Postoperative pericarditis. Improved on ECG. Continue colchicine.  5. Anemia, somewhat dilutional. Will monitor.

## 2021-09-13 NOTE — PROGRESS NOTE ADULT - ASSESSMENT
42 y/o male who presents with a chief complaint of fever; echo demonstrates flail posterior leaflet torn chordae and torrential MR.  Pre-op initially for today, but ended up testing positive for COVID, confirmed w/ repeat test. Patient was treated with monoclonal antibodies and discharged home on 8/31 and completed 10 day course of Abx. Now presents for Cardiac Surgery.     9/10 s/p Open reconstruction of mitral valve #28 Gold annuloplasty ring and P2 / Clipping, left atrial appendage        40 y/o male who presents with a chief complaint of fever; echo demonstrates flail posterior leaflet torn chordae and torrential MR.  Pre-op initially for today, but ended up testing positive for COVID, confirmed w/ repeat test. Patient was treated with monoclonal antibodies and discharged home on 8/31 and completed 10 day course of Abx. Now presents to [facility] for Cardiac Surgery.     9/10 s/p Open reconstruction of mitral valve #28 Gold annuloplasty ring and P2 / Clipping, left atrial appendage.   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  Toradol as needed for pain.   9/13 Transfer to 2 [facility] Telemetry floor.        42 y/o male who presents with a chief complaint of fever; echo demonstrates flail posterior leaflet torn chordae and torrential MR.  Pre-op initially for today, but ended up testing positive for COVID, confirmed w/ repeat test. Patient was treated with monoclonal antibodies and discharged home on 8/31 and completed 10 day course of Abx. Now presents to [facility] for Cardiac Surgery.     9/10 s/p Open reconstruction of mitral valve #28 Gold annuloplasty ring and P2 / Clipping, left atrial appendage.   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  Toradol as needed for pain.   Pressor support required on Levo and Vaso gtt --> weaned off.   9/13 Transfer to 2 [facility] Telemetry floor. Continue on current medication regimen.    Disposition: Home once medically cleared

## 2021-09-13 NOTE — PROGRESS NOTE ADULT - SUBJECTIVE AND OBJECTIVE BOX
HPI: Feels much better. Some vague chest discomfort.    The medical history is unchanged.  Review Of Systems:  No orthopnea or change in leg edema. The remainder of his ROS is unchanged.    Medications:  aspirin enteric coated 81 milliGRAM(s) Oral daily  colchicine 0.6 milliGRAM(s) Oral every 12 hours  enoxaparin Injectable 40 milliGRAM(s) SubCutaneous daily  furosemide    Tablet 20 milliGRAM(s) Oral two times a day  HYDROmorphone PCA (1 mG/mL) 30 milliLiter(s) PCA Continuous PCA Continuous  HYDROmorphone PCA (1 mG/mL) Rescue Clinician Bolus 0.5 milliGRAM(s) IV Push every 15 minutes PRN  metoprolol tartrate 25 milliGRAM(s) Oral two times a day  nalbuphine Injectable 2.5 milliGRAM(s) IV Push every 6 hours PRN  naloxone Injectable 0.1 milliGRAM(s) IV Push every 3 minutes PRN  ondansetron Injectable 4 milliGRAM(s) IV Push every 6 hours PRN  oxycodone    5 mG/acetaminophen 325 mG 1 Tablet(s) Oral every 4 hours PRN  oxycodone    5 mG/acetaminophen 325 mG 2 Tablet(s) Oral every 4 hours PRN  pantoprazole    Tablet 40 milliGRAM(s) Oral before breakfast  polyethylene glycol 3350 17 Gram(s) Oral daily  sodium chloride 0.9%. 1000 milliLiter(s) IV Continuous <Continuous>  spironolactone 25 milliGRAM(s) Oral two times a day    Allergies    No Known Allergies    Intolerances        Vitals:  T(F): 99.4 (21 @ 04:00), Max: 100.2 (21 @ 08:00)  HR: 88 (21 @ 07:00) (87 - 102)  BP: 103/62 (21 @ 07:00) (98/60 - 109/58)  RR: 25 (21 @ 07:00) (14 - 34)  SpO2: 92% (21 @ 07:00) (92% - 100%)  Daily     Daily Weight in k.5 (13 Sep 2021 00:00)  I&O's Summary    12 Sep 2021 07:01  -  13 Sep 2021 07:00  --------------------------------------------------------  IN: 1910 mL / OUT: 2982 mL / NET: -1072 mL        Physical Exam:  Appearance: NAD  HEENT: No icterus or JVD  Cardiovascular: Regular rhythm, normal S1, S2, No murmurs or rubs, No edema. Sternal scar healing well (covered).  Respiratory: clear to ascultation anterirorly, no crackles or wheeze.  Gastrointestinal: +BS, soft  Musculoskeletal: No clubbing  Neurologic: Non-focal, alert and oriented, Mood & affect appropriate  Skin: Warm and moist, no cyanosis                          8.3    9.16  )-----------( 133      ( 13 Sep 2021 00:57 )             25.4     09-13    134<L>  |  100  |  8   ----------------------------<  98  4.4   |  25  |  0.76    Ca    8.4      13 Sep 2021 00:58  Phos  2.6     09-13  Mg     2.3     09-13    TPro  5.3<L>  /  Alb  3.2<L>  /  TBili  0.2  /  DBili  x   /  AST  29  /  ALT  22  /  AlkPhos  42  09-13    CARDIAC MARKERS ( 12 Sep 2021 00:28 )  x     / x     / 359 U/L / x     / 10.1 ng/mL  CARDIAC MARKERS ( 11 Sep 2021 14:04 )  x     / x     / 402 U/L / x     / 15.4 ng/mL               Interpretation of Telemetry:  ECG:  Echo:  Stress Testing:   Cath:  Imaging:

## 2021-09-13 NOTE — PROGRESS NOTE ADULT - SUBJECTIVE AND OBJECTIVE BOX
Day 1 of Anesthesia Pain Management Service    SUBJECTIVE: Doing well    Pain Scale Score:	[X] Refer to charted pain scores    THERAPY:    [ ] IV PCA Morphine		        [ ] 5 mg/mL	[ ] 1 mg/mL  [X] IV PCA Hydromorphone	[ ] 5 mg/mL	[X] 1 mg/mL  [ ] IV PCA Fentanyl		        [ ] 50 micrograms/mL    Demand dose: 0.2 mg     Lockout: 6 minutes   Continuous Rate: 0 mg/hr  4 Hour Limit: 4 mg    MEDICATIONS  (STANDING):  aspirin enteric coated 81 milliGRAM(s) Oral daily  colchicine 0.6 milliGRAM(s) Oral every 12 hours  enoxaparin Injectable 40 milliGRAM(s) SubCutaneous daily  furosemide    Tablet 20 milliGRAM(s) Oral two times a day  HYDROmorphone PCA (1 mG/mL) 30 milliLiter(s) PCA Continuous PCA Continuous  metoprolol tartrate 25 milliGRAM(s) Oral two times a day  pantoprazole    Tablet 40 milliGRAM(s) Oral before breakfast  polyethylene glycol 3350 17 Gram(s) Oral daily  sodium chloride 0.9%. 1000 milliLiter(s) (30 mL/Hr) IV Continuous <Continuous>  spironolactone 25 milliGRAM(s) Oral two times a day    MEDICATIONS  (PRN):  HYDROmorphone PCA (1 mG/mL) Rescue Clinician Bolus 0.5 milliGRAM(s) IV Push every 15 minutes PRN for Pain Scale GREATER THAN 6  nalbuphine Injectable 2.5 milliGRAM(s) IV Push every 6 hours PRN Pruritus  naloxone Injectable 0.1 milliGRAM(s) IV Push every 3 minutes PRN For ANY of the following changes in patient status:  A. RR LESS THAN 10 breaths per minute, B. Oxygen saturation LESS THAN 90%, C. Sedation score of 6  ondansetron Injectable 4 milliGRAM(s) IV Push every 6 hours PRN Nausea  oxycodone    5 mG/acetaminophen 325 mG 1 Tablet(s) Oral every 4 hours PRN Mild Pain (1 - 3)  oxycodone    5 mG/acetaminophen 325 mG 2 Tablet(s) Oral every 4 hours PRN Moderate Pain (4 - 6)      OBJECTIVE:    Sedation Score:	[ X] Alert	 [ ] Drowsy 	[ ] Arousable	[ ] Asleep	[ ] Unresponsive    Side Effects:	[X ] None	[ ] Nausea	[ ] Vomiting	[ ] Pruritus  		[ ] Other:    Vital Signs Last 24 Hrs  T(C): 36.7 (13 Sep 2021 08:00), Max: 37.6 (12 Sep 2021 12:00)  T(F): 98.1 (13 Sep 2021 08:00), Max: 99.7 (12 Sep 2021 12:00)  HR: 91 (13 Sep 2021 08:00) (87 - 102)  BP: 96/53 (13 Sep 2021 08:00) (96/53 - 109/58)  BP(mean): 68 (13 Sep 2021 08:00) (68 - 80)  RR: 16 (13 Sep 2021 08:00) (14 - 34)  SpO2: 95% (13 Sep 2021 08:00) (92% - 100%)    ASSESSMENT/ PLAN    Therapy to  be:               [  ] Continued   [X ] Discontinued   [ X] Changed to PRN Analgesics    Documentation and Verification of current medications:   [X] Done	[ ] Not done, not eligible    Comments: OOB in chair. Pain control improved. Plan to D\C PCA and transition to prn analgesics

## 2021-09-13 NOTE — PROGRESS NOTE ADULT - SUBJECTIVE AND OBJECTIVE BOX
VITAL SIGNS    Subjective:     Telemetry:      Vital Signs Last 24 Hrs  T(C): 36.9 (21 @ 10:37), Max: 37.4 (21 @ 20:00)  T(F): 98.4 (21 @ 10:37), Max: 99.4 (21 @ 04:00)  HR: 88 (21 @ 10:37) (87 - 102)  BP: 109/70 (21 @ 10:37) (96/53 - 109/70)  RR: 18 (21 @ 10:37) (14 - 34)  SpO2: 96% (21 @ 10:37) (92% - 100%)                    @ 07:01  -   @ 07:00  --------------------------------------------------------  IN: 1910 mL / OUT: 2982 mL / NET: -1072 mL     @ 07:01  -   @ 13:02  --------------------------------------------------------  IN: 450 mL / OUT: 300 mL / NET: 150 mL          Daily     Daily Weight in k.5 (13 Sep 2021 00:00)        CAPILLARY BLOOD GLUCOSE      POCT Blood Glucose.: 134 mg/dL (12 Sep 2021 16:49)          Drains:     MS         [  ] Drainage:                 L Pleural  [  ]  Drainage:                R Pleural  [  ]  Drainage:    Pacing Wires        [  ]   Settings:                                  Isolated  [  ]    Coumadin    [ ] YES          [  ]      NO         Reason:                               PHYSICAL EXAM        Neurology: alert and oriented x 3, nonfocal, no gross deficits    CV:     Sternal Wound:  MSI -->CDI , sternum stable    Lungs: CTA B/L     Abdomen: soft, nontender, nondistended, positive bowel sounds, (+) Flatus; (+) BM     :  Voiding               Extremities:  B/L LE (+) edema; negative calf tenderness; (+) 2 DP palpable        acetaminophen Tablet .. 650 milliGRAM(s) Oral every 6 hours  aspirin enteric coated 81 milliGRAM(s) Oral daily  colchicine 0.6 milliGRAM(s) Oral every 12 hours  enoxaparin Injectable 40 milliGRAM(s) SubCutaneous daily  furosemide    Tablet 20 milliGRAM(s) Oral two times a day  gabapentin 100 milliGRAM(s) Oral every 8 hours  metoprolol tartrate 25 milliGRAM(s) Oral two times a day  oxyCODONE IR 5 milliGRAM(s) Oral every 4 hours PRN  oxyCODONE IR 10 milliGRAM(s) Oral every 4 hours PRN  pantoprazole Tablet 40 milliGRAM(s) Oral before breakfast  polyethylene glycol 3350 17 Gram(s) Oral daily  sodium chloride 0.9% lock flush 3 milliLiter(s) IV Push every 8 hours  spironolactone 25 milliGRAM(s) Oral two times a day    Physical Therapy Rec:   Home  [  ]   Home w/ PT  [  ]  Rehab  [  ]    Discussed with Cardiothoracic Team at AM rounds.     VITAL SIGNS    Subjective: "I'm feeling ok" Denies CP, palpitation, SOB, SON, HA, dizziness, N/V/D, fever or chills.  No acute event noted overnight.     Telemetry: NSR 90       Vital Signs Last 24 Hrs  T(C): 36.9 (21 @ 10:37), Max: 37.4 (21 @ 20:00)  T(F): 98.4 (21 @ 10:37), Max: 99.4 (21 @ 04:00)  HR: 88 (21 @ 10:37) (87 - 102)  BP: 109/70 (21 @ 10:37) (96/53 - 109/70)  RR: 18 (21 @ 10:37) (14 - 34)  SpO2: 96% (21 @ 10:37) (92% - 100%)            @ 07:01  -   @ 07:00  --------------------------------------------------------  IN: 1910 mL / OUT: 2982 mL / NET: -1072 mL     @ 07:01  -   @ 13:02  --------------------------------------------------------  IN: 450 mL / OUT: 300 mL / NET: 150 mL    Daily     Daily Weight in k.5 (13 Sep 2021 00:00)                 PHYSICAL EXAM    Neurology: alert and oriented x 3, nonfocal, no gross deficits    CV: (+) S1 and S2, No murmurs, rubs, gallops or clicks     Sternal Wound: MSI -->CDI , sternum stable    Lungs: CTA B/L     Abdomen: soft, nontender, nondistended, positive bowel sounds, (+) Flatus; (+) BM     :  Voiding               Extremities:  B/L LE (+) edema; negative calf tenderness; (+) 2 DP palpable        acetaminophen Tablet .. 650 milliGRAM(s) Oral every 6 hours  aspirin enteric coated 81 milliGRAM(s) Oral daily  colchicine 0.6 milliGRAM(s) Oral every 12 hours  enoxaparin Injectable 40 milliGRAM(s) SubCutaneous daily  furosemide    Tablet 20 milliGRAM(s) Oral two times a day  gabapentin 100 milliGRAM(s) Oral every 8 hours  metoprolol tartrate 25 milliGRAM(s) Oral two times a day  oxyCODONE IR 5 milliGRAM(s) Oral every 4 hours PRN  oxyCODONE IR 10 milliGRAM(s) Oral every 4 hours PRN  pantoprazole Tablet 40 milliGRAM(s) Oral before breakfast  polyethylene glycol 3350 17 Gram(s) Oral daily  sodium chloride 0.9% lock flush 3 milliLiter(s) IV Push every 8 hours  spironolactone 25 milliGRAM(s) Oral two times a day    Physical Therapy Rec:   Home  [  ]   Home w/ PT  [ X ]  Rehab  [  ]    Discussed with Cardiothoracic Team at AM rounds.     VITAL SIGNS    Subjective: "I'm feeling ok" Denies CP, palpitation, SOB, SON, HA, dizziness, N/V/D, fever or chills.  No acute event noted overnight.     Telemetry: NSR 90       Vital Signs Last 24 Hrs  T(C): 36.9 (21 @ 10:37), Max: 37.4 (21 @ 20:00)  T(F): 98.4 (21 @ 10:37), Max: 99.4 (21 @ 04:00)  HR: 88 (21 @ 10:37) (87 - 102)  BP: 109/70 (21 @ 10:37) (96/53 - 109/70)  RR: 18 (21 @ 10:37) (14 - 34)  SpO2: 96% (21 @ 10:37) (92% - 100%)            @ 07:01  -   @ 07:00  --------------------------------------------------------  IN: 1910 mL / OUT: 2982 mL / NET: -1072 mL     @ 07:01  -   @ 13:02  --------------------------------------------------------  IN: 450 mL / OUT: 300 mL / NET: 150 mL    Daily     Daily Weight in k.5 (13 Sep 2021 00:00)                 PHYSICAL EXAM    Neurology: alert and oriented x 3, nonfocal, no gross deficits    CV: (+) S1 and S2, No murmurs, rubs, gallops or clicks     Sternal Wound: MSI -->CDI , sternum stable    Lungs: CTA B/L     Abdomen: soft, nontender, nondistended, positive bowel sounds, (+) Flatus; (+) BM     :  Voiding               Extremities:  B/L LE (-) edema; negative calf tenderness; (+) 2 DP palpable        acetaminophen Tablet .. 650 milliGRAM(s) Oral every 6 hours  aspirin enteric coated 81 milliGRAM(s) Oral daily  colchicine 0.6 milliGRAM(s) Oral every 12 hours  enoxaparin Injectable 40 milliGRAM(s) SubCutaneous daily  furosemide    Tablet 20 milliGRAM(s) Oral two times a day  gabapentin 100 milliGRAM(s) Oral every 8 hours  metoprolol tartrate 25 milliGRAM(s) Oral two times a day  oxyCODONE IR 5 milliGRAM(s) Oral every 4 hours PRN  oxyCODONE IR 10 milliGRAM(s) Oral every 4 hours PRN  pantoprazole Tablet 40 milliGRAM(s) Oral before breakfast  polyethylene glycol 3350 17 Gram(s) Oral daily  sodium chloride 0.9% lock flush 3 milliLiter(s) IV Push every 8 hours  spironolactone 25 milliGRAM(s) Oral two times a day    Physical Therapy Rec:   Home  [  ]   Home w/ PT  [ X ]  Rehab  [  ]    Discussed with Cardiothoracic Team at AM rounds.     VITAL SIGNS    Subjective: "I'm feeling ok" Denies CP, palpitation, SOB, SON, HA, dizziness, N/V/D, fever or chills.  No acute event noted overnight.     Telemetry: NSR 90       Vital Signs Last 24 Hrs  T(C): 36.9 (21 @ 10:37), Max: 37.4 (21 @ 20:00)  T(F): 98.4 (21 @ 10:37), Max: 99.4 (21 @ 04:00)  HR: 88 (21 @ 10:37) (87 - 102)  BP: 109/70 (21 @ 10:37) (96/53 - 109/70)  RR: 18 (21 @ 10:37) (14 - 34)  SpO2: 96% (21 @ 10:37) (92% - 100%)            @ 07:01  -   @ 07:00  --------------------------------------------------------  IN: 1910 mL / OUT: 2982 mL / NET: -1072 mL     @ 07:01  -   @ 13:02  --------------------------------------------------------  IN: 450 mL / OUT: 300 mL / NET: 150 mL    Daily     Daily Weight in k.5 (13 Sep 2021 00:00)                 PHYSICAL EXAM    Neurology: alert and oriented x 3, nonfocal, no gross deficits    CV: (+) S1 and S2, No murmurs, rubs, gallops or clicks     Sternal Wound: MSI -->CDI , sternum stable    Lungs: CTA B/L     Abdomen: soft, nontender, nondistended, positive bowel sounds, (+) Flatus; (+) BM     :  Voiding               Extremities:  B/L LE (-) edema; negative calf tenderness; (+) 2 DP palpable        acetaminophen Tablet .. 650 milliGRAM(s) Oral every 6 hours  aspirin enteric coated 81 milliGRAM(s) Oral daily  colchicine 0.6 milliGRAM(s) Oral every 12 hours  enoxaparin Injectable 40 milliGRAM(s) SubCutaneous daily  furosemide Tablet 20 milliGRAM(s) Oral two times a day  gabapentin 100 milliGRAM(s) Oral every 8 hours  metoprolol tartrate 25 milliGRAM(s) Oral two times a day  oxyCODONE IR 5 milliGRAM(s) Oral every 4 hours PRN  oxyCODONE IR 10 milliGRAM(s) Oral every 4 hours PRN  pantoprazole Tablet 40 milliGRAM(s) Oral before breakfast  polyethylene glycol 3350 17 Gram(s) Oral daily  sodium chloride 0.9% lock flush 3 milliLiter(s) IV Push every 8 hours  spironolactone 25 milliGRAM(s) Oral two times a day    Physical Therapy Rec:   Home  [  ]   Home w/ PT  [ X ]  Rehab  [  ]    Discussed with Cardiothoracic Team at AM rounds.

## 2021-09-14 LAB
ALBUMIN SERPL ELPH-MCNC: 3.1 G/DL — LOW (ref 3.3–5)
ALP SERPL-CCNC: 45 U/L — SIGNIFICANT CHANGE UP (ref 40–120)
ALT FLD-CCNC: 28 U/L — SIGNIFICANT CHANGE UP (ref 10–45)
ANION GAP SERPL CALC-SCNC: 12 MMOL/L — SIGNIFICANT CHANGE UP (ref 5–17)
AST SERPL-CCNC: 26 U/L — SIGNIFICANT CHANGE UP (ref 10–40)
BASOPHILS # BLD AUTO: 0.02 K/UL — SIGNIFICANT CHANGE UP (ref 0–0.2)
BASOPHILS NFR BLD AUTO: 0.4 % — SIGNIFICANT CHANGE UP (ref 0–2)
BILIRUB SERPL-MCNC: 0.3 MG/DL — SIGNIFICANT CHANGE UP (ref 0.2–1.2)
BUN SERPL-MCNC: 9 MG/DL — SIGNIFICANT CHANGE UP (ref 7–23)
CALCIUM SERPL-MCNC: 8.6 MG/DL — SIGNIFICANT CHANGE UP (ref 8.4–10.5)
CHLORIDE SERPL-SCNC: 101 MMOL/L — SIGNIFICANT CHANGE UP (ref 96–108)
CO2 SERPL-SCNC: 26 MMOL/L — SIGNIFICANT CHANGE UP (ref 22–31)
CREAT SERPL-MCNC: 0.78 MG/DL — SIGNIFICANT CHANGE UP (ref 0.5–1.3)
EOSINOPHIL # BLD AUTO: 0.11 K/UL — SIGNIFICANT CHANGE UP (ref 0–0.5)
EOSINOPHIL NFR BLD AUTO: 2.1 % — SIGNIFICANT CHANGE UP (ref 0–6)
GLUCOSE SERPL-MCNC: 101 MG/DL — HIGH (ref 70–99)
HCT VFR BLD CALC: 25.5 % — LOW (ref 39–50)
HGB BLD-MCNC: 8.2 G/DL — LOW (ref 13–17)
IMM GRANULOCYTES NFR BLD AUTO: 0.2 % — SIGNIFICANT CHANGE UP (ref 0–1.5)
LYMPHOCYTES # BLD AUTO: 0.95 K/UL — LOW (ref 1–3.3)
LYMPHOCYTES # BLD AUTO: 17.8 % — SIGNIFICANT CHANGE UP (ref 13–44)
MAGNESIUM SERPL-MCNC: 2.3 MG/DL — SIGNIFICANT CHANGE UP (ref 1.6–2.6)
MCHC RBC-ENTMCNC: 28.5 PG — SIGNIFICANT CHANGE UP (ref 27–34)
MCHC RBC-ENTMCNC: 32.2 GM/DL — SIGNIFICANT CHANGE UP (ref 32–36)
MCV RBC AUTO: 88.5 FL — SIGNIFICANT CHANGE UP (ref 80–100)
MONOCYTES # BLD AUTO: 0.51 K/UL — SIGNIFICANT CHANGE UP (ref 0–0.9)
MONOCYTES NFR BLD AUTO: 9.6 % — SIGNIFICANT CHANGE UP (ref 2–14)
NEUTROPHILS # BLD AUTO: 3.74 K/UL — SIGNIFICANT CHANGE UP (ref 1.8–7.4)
NEUTROPHILS NFR BLD AUTO: 69.9 % — SIGNIFICANT CHANGE UP (ref 43–77)
NRBC # BLD: 0 /100 WBCS — SIGNIFICANT CHANGE UP (ref 0–0)
PLATELET # BLD AUTO: 162 K/UL — SIGNIFICANT CHANGE UP (ref 150–400)
POTASSIUM SERPL-MCNC: 3.9 MMOL/L — SIGNIFICANT CHANGE UP (ref 3.5–5.3)
POTASSIUM SERPL-SCNC: 3.9 MMOL/L — SIGNIFICANT CHANGE UP (ref 3.5–5.3)
PROT SERPL-MCNC: 5.5 G/DL — LOW (ref 6–8.3)
RBC # BLD: 2.88 M/UL — LOW (ref 4.2–5.8)
RBC # FLD: 11.8 % — SIGNIFICANT CHANGE UP (ref 10.3–14.5)
SODIUM SERPL-SCNC: 139 MMOL/L — SIGNIFICANT CHANGE UP (ref 135–145)
WBC # BLD: 5.34 K/UL — SIGNIFICANT CHANGE UP (ref 3.8–10.5)
WBC # FLD AUTO: 5.34 K/UL — SIGNIFICANT CHANGE UP (ref 3.8–10.5)

## 2021-09-14 PROCEDURE — 71045 X-RAY EXAM CHEST 1 VIEW: CPT | Mod: 26

## 2021-09-14 RX ORDER — METOPROLOL TARTRATE 50 MG
25 TABLET ORAL EVERY 8 HOURS
Refills: 0 | Status: DISCONTINUED | OUTPATIENT
Start: 2021-09-14 | End: 2021-09-14

## 2021-09-14 RX ORDER — METOPROLOL TARTRATE 50 MG
25 TABLET ORAL ONCE
Refills: 0 | Status: COMPLETED | OUTPATIENT
Start: 2021-09-14 | End: 2021-09-14

## 2021-09-14 RX ORDER — METOPROLOL TARTRATE 50 MG
50 TABLET ORAL
Refills: 0 | Status: DISCONTINUED | OUTPATIENT
Start: 2021-09-14 | End: 2021-09-15

## 2021-09-14 RX ADMIN — Medication 650 MILLIGRAM(S): at 23:19

## 2021-09-14 RX ADMIN — Medication 0.6 MILLIGRAM(S): at 17:44

## 2021-09-14 RX ADMIN — GABAPENTIN 100 MILLIGRAM(S): 400 CAPSULE ORAL at 21:06

## 2021-09-14 RX ADMIN — Medication 81 MILLIGRAM(S): at 11:47

## 2021-09-14 RX ADMIN — Medication 25 MILLIGRAM(S): at 18:28

## 2021-09-14 RX ADMIN — Medication 650 MILLIGRAM(S): at 17:44

## 2021-09-14 RX ADMIN — ENOXAPARIN SODIUM 40 MILLIGRAM(S): 100 INJECTION SUBCUTANEOUS at 11:47

## 2021-09-14 RX ADMIN — SODIUM CHLORIDE 3 MILLILITER(S): 9 INJECTION INTRAMUSCULAR; INTRAVENOUS; SUBCUTANEOUS at 14:03

## 2021-09-14 RX ADMIN — GABAPENTIN 100 MILLIGRAM(S): 400 CAPSULE ORAL at 06:07

## 2021-09-14 RX ADMIN — SODIUM CHLORIDE 3 MILLILITER(S): 9 INJECTION INTRAMUSCULAR; INTRAVENOUS; SUBCUTANEOUS at 21:05

## 2021-09-14 RX ADMIN — PANTOPRAZOLE SODIUM 40 MILLIGRAM(S): 20 TABLET, DELAYED RELEASE ORAL at 06:07

## 2021-09-14 RX ADMIN — Medication 650 MILLIGRAM(S): at 21:06

## 2021-09-14 RX ADMIN — Medication 650 MILLIGRAM(S): at 06:08

## 2021-09-14 RX ADMIN — GABAPENTIN 100 MILLIGRAM(S): 400 CAPSULE ORAL at 14:05

## 2021-09-14 RX ADMIN — Medication 650 MILLIGRAM(S): at 06:13

## 2021-09-14 RX ADMIN — Medication 650 MILLIGRAM(S): at 11:51

## 2021-09-14 RX ADMIN — Medication 0.6 MILLIGRAM(S): at 06:07

## 2021-09-14 RX ADMIN — Medication 20 MILLIGRAM(S): at 17:44

## 2021-09-14 RX ADMIN — Medication 25 MILLIGRAM(S): at 14:04

## 2021-09-14 RX ADMIN — Medication 650 MILLIGRAM(S): at 11:47

## 2021-09-14 RX ADMIN — SODIUM CHLORIDE 3 MILLILITER(S): 9 INJECTION INTRAMUSCULAR; INTRAVENOUS; SUBCUTANEOUS at 06:14

## 2021-09-14 RX ADMIN — Medication 650 MILLIGRAM(S): at 17:49

## 2021-09-14 RX ADMIN — OXYCODONE HYDROCHLORIDE 5 MILLIGRAM(S): 5 TABLET ORAL at 04:37

## 2021-09-14 RX ADMIN — OXYCODONE HYDROCHLORIDE 5 MILLIGRAM(S): 5 TABLET ORAL at 03:52

## 2021-09-14 RX ADMIN — Medication 25 MILLIGRAM(S): at 06:13

## 2021-09-14 RX ADMIN — Medication 20 MILLIGRAM(S): at 06:07

## 2021-09-14 RX ADMIN — SPIRONOLACTONE 25 MILLIGRAM(S): 25 TABLET, FILM COATED ORAL at 06:14

## 2021-09-14 RX ADMIN — SPIRONOLACTONE 25 MILLIGRAM(S): 25 TABLET, FILM COATED ORAL at 17:45

## 2021-09-14 NOTE — PROGRESS NOTE ADULT - ASSESSMENT
40 y/o male who presents with a chief complaint of fever; echo demonstrates flail posterior leaflet torn chordae and torrential MR.  Pre-op initially for today, but ended up testing positive for COVID, confirmed w/ repeat test. Patient was treated with monoclonal antibodies and discharged home on 8/31 and completed 10 day course of Abx. Now presents to [facility] for Cardiac Surgery.     9/10 s/p Open reconstruction of mitral valve #28 Gold annuloplasty ring and P2 / Clipping, left atrial appendage.   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  Toradol as needed for pain.   Pressor support required on Levo and Vaso gtt --> weaned off.   9/13 Transfer to 2 [facility] Telemetry floor. Continue on current medication regimen.    Disposition: Home once medically cleared   9/14 Increase ambulation  gentle diuresis  d/c planning     40 y/o male who presents with a chief complaint of fever; echo demonstrates flail posterior leaflet torn chordae and torrential MR.  Pre-op initially for today, but ended up testing positive for COVID, confirmed w/ repeat test. Patient was treated with monoclonal antibodies and discharged home on 8/31 and completed 10 day course of Abx. Now presents to [facility] for Cardiac Surgery.     9/10 s/p Open reconstruction of mitral valve #28 Gold annuloplasty ring and P2 / Clipping, left atrial appendage.   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  Toradol as needed for pain.   Pressor support required on Levo and Vaso gtt --> weaned off.   9/13 Transfer to 2 [facility] Telemetry floor. Continue on current medication regimen.    Disposition: Home once medically cleared   9/14 Increase ambulation  gentle diuresis. Increase beta blocker.  d/c planning

## 2021-09-14 NOTE — PROGRESS NOTE ADULT - PROBLEM SELECTOR PLAN 1
Continue with ASA 81 mg PO Daily.   Continue with Lopressor 25 mg PO BID.   Continue with Aldactone 25 mg PO daily   Continue on Lasix 20 mg PO BID   Continue on Colchicine 0.6 mg PO BID   Increase activity as tolerated.   Encourage Chest PT / Pulmonary toileting and Incentive spirometry every 1 hour x 10 while awake.   Continue with PUD and DVT prophylaxis.   Shower on POD #5.   D/C plan home once medically cleared   Plan of care discussed with attending Continue with ASA 81 mg PO Daily.   Continue with Lopressor 25 mg PO tid   Continue with Aldactone 25 mg PO daily   Continue on Lasix 20 mg PO BID   Continue on Colchicine 0.6 mg PO BID   Increase activity as tolerated.   Encourage Chest PT / Pulmonary toileting and Incentive spirometry every 1 hour x 10 while awake.   Continue with PUD and DVT prophylaxis.   Shower on POD #5.   D/C plan home once medically cleared   Plan of care discussed with attending

## 2021-09-14 NOTE — PROGRESS NOTE ADULT - SUBJECTIVE AND OBJECTIVE BOX
VITAL SIGNS    Telemetry:  [id number]    Vital Signs Last 24 Hrs  T(C): 37 (09-14-21 @ 05:30), Max: 37 (09-14-21 @ 05:30)  T(F): 98.6 (09-14-21 @ 05:30), Max: 98.6 (09-14-21 @ 05:30)  HR: 93 (09-14-21 @ 05:30) (85 - 93)  BP: 114/75 (09-14-21 @ 05:30) (96/53 - 114/75)  RR: 18 (09-14-21 @ 05:30) (16 - 31)  SpO2: 94% (09-14-21 @ 05:30) (94% - 96%)                   09-13 @ 07:01  -  09-14 @ 07:00  --------------------------------------------------------  IN: 1370 mL / OUT: 300 mL / NET: 1070 mL    09-14 @ 07:01  -  09-14 @ 07:54  --------------------------------------------------------  IN: 0 mL / OUT: 850 mL / NET: -850 mL          Daily     Daily             CAPILLARY BLOOD GLUCOSE                    Pacing Wires            Coumadin    [ ] YES          [x  ]      NO                                   PHYSICAL EXAM        Neurology: alert and oriented x 3, nonfocal, no gross deficits  CV : s1 s2 RRR  Sternal Wound :  CDI , Stable  Lungs: cta  Abdomen: soft, nontender, nondistended, positive bowel sounds, last bowel movement                   :    voiding / collazo - sbd         Extremities:      edema   /  -   calve tenderness ,    L leg  /  R leg  incisions cdi          acetaminophen   Tablet .. 650 milliGRAM(s) Oral every 6 hours  aspirin enteric coated 81 milliGRAM(s) Oral daily  colchicine 0.6 milliGRAM(s) Oral every 12 hours  enoxaparin Injectable 40 milliGRAM(s) SubCutaneous daily  furosemide    Tablet 20 milliGRAM(s) Oral two times a day  gabapentin 100 milliGRAM(s) Oral every 8 hours  metoprolol tartrate 25 milliGRAM(s) Oral two times a day  oxyCODONE    IR 5 milliGRAM(s) Oral every 4 hours PRN  oxyCODONE    IR 10 milliGRAM(s) Oral every 4 hours PRN  pantoprazole    Tablet 40 milliGRAM(s) Oral before breakfast  polyethylene glycol 3350 17 Gram(s) Oral daily  sodium chloride 0.9% lock flush 3 milliLiter(s) IV Push every 8 hours  spironolactone 25 milliGRAM(s) Oral two times a day                    Physical Therapy Rec:   Home  [  ]   Home w/ PT  [  ]  Rehab  [  ]  Discussed with Cardiothoracic Team at AM rounds.                  VITAL SIGNS    Telemetry:  [id number]    Vital Signs Last 24 Hrs  T(C): 37 (09-14-21 @ 05:30), Max: 37 (09-14-21 @ 05:30)  T(F): 98.6 (09-14-21 @ 05:30), Max: 98.6 (09-14-21 @ 05:30)  HR: 93 (09-14-21 @ 05:30) (85 - 93)  BP: 114/75 (09-14-21 @ 05:30) (96/53 - 114/75)  RR: 18 (09-14-21 @ 05:30) (16 - 31)  SpO2: 94% (09-14-21 @ 05:30) (94% - 96%)                   09-13 @ 07:01  -  09-14 @ 07:00  --------------------------------------------------------  IN: 1370 mL / OUT: 300 mL / NET: 1070 mL    09-14 @ 07:01  -  09-14 @ 07:54  --------------------------------------------------------  IN: 0 mL / OUT: 850 mL / NET: -850 mL          Daily     Daily             CAPILLARY BLOOD GLUCOSE                    Pacing Wires            Coumadin    [ ] YES          [x  ]      NO                                   PHYSICAL EXAM        Neurology: alert and oriented x 3, nonfocal, no gross deficits  CV : s1 s2 RRR  Sternal Wound :  CDI , Stable  Lungs: cta  Abdomen: soft, nontender, nondistended, positive bowel sounds, last bowel movement                   :    voiding      Extremities:    trace   edema   /  -   calve tenderness ,           acetaminophen   Tablet .. 650 milliGRAM(s) Oral every 6 hours  aspirin enteric coated 81 milliGRAM(s) Oral daily  colchicine 0.6 milliGRAM(s) Oral every 12 hours  enoxaparin Injectable 40 milliGRAM(s) SubCutaneous daily  furosemide    Tablet 20 milliGRAM(s) Oral two times a day  gabapentin 100 milliGRAM(s) Oral every 8 hours  metoprolol tartrate 25 milliGRAM(s) Oral two times a day  oxyCODONE    IR 5 milliGRAM(s) Oral every 4 hours PRN  oxyCODONE    IR 10 milliGRAM(s) Oral every 4 hours PRN  pantoprazole    Tablet 40 milliGRAM(s) Oral before breakfast  polyethylene glycol 3350 17 Gram(s) Oral daily  sodium chloride 0.9% lock flush 3 milliLiter(s) IV Push every 8 hours  spironolactone 25 milliGRAM(s) Oral two times a day                    Physical Therapy Rec:   Home  [ x ]   Home w/ PT  [  ]  Rehab  [  ]  Discussed with Cardiothoracic Team at AM rounds.

## 2021-09-15 ENCOUNTER — TRANSCRIPTION ENCOUNTER (OUTPATIENT)
Age: 41
End: 2021-09-15

## 2021-09-15 VITALS — WEIGHT: 168.43 LBS

## 2021-09-15 LAB
ALBUMIN SERPL ELPH-MCNC: 3.2 G/DL — LOW (ref 3.3–5)
ALP SERPL-CCNC: 42 U/L — SIGNIFICANT CHANGE UP (ref 40–120)
ALT FLD-CCNC: 23 U/L — SIGNIFICANT CHANGE UP (ref 10–45)
ANION GAP SERPL CALC-SCNC: 12 MMOL/L — SIGNIFICANT CHANGE UP (ref 5–17)
AST SERPL-CCNC: 14 U/L — SIGNIFICANT CHANGE UP (ref 10–40)
BASOPHILS # BLD AUTO: 0.03 K/UL — SIGNIFICANT CHANGE UP (ref 0–0.2)
BASOPHILS NFR BLD AUTO: 0.6 % — SIGNIFICANT CHANGE UP (ref 0–2)
BILIRUB SERPL-MCNC: 0.3 MG/DL — SIGNIFICANT CHANGE UP (ref 0.2–1.2)
BUN SERPL-MCNC: 7 MG/DL — SIGNIFICANT CHANGE UP (ref 7–23)
CALCIUM SERPL-MCNC: 9 MG/DL — SIGNIFICANT CHANGE UP (ref 8.4–10.5)
CHLORIDE SERPL-SCNC: 103 MMOL/L — SIGNIFICANT CHANGE UP (ref 96–108)
CO2 SERPL-SCNC: 23 MMOL/L — SIGNIFICANT CHANGE UP (ref 22–31)
CREAT SERPL-MCNC: 0.81 MG/DL — SIGNIFICANT CHANGE UP (ref 0.5–1.3)
EOSINOPHIL # BLD AUTO: 0.13 K/UL — SIGNIFICANT CHANGE UP (ref 0–0.5)
EOSINOPHIL NFR BLD AUTO: 2.5 % — SIGNIFICANT CHANGE UP (ref 0–6)
GLUCOSE SERPL-MCNC: 92 MG/DL — SIGNIFICANT CHANGE UP (ref 70–99)
HCT VFR BLD CALC: 26.5 % — LOW (ref 39–50)
HGB BLD-MCNC: 8.7 G/DL — LOW (ref 13–17)
IMM GRANULOCYTES NFR BLD AUTO: 0.6 % — SIGNIFICANT CHANGE UP (ref 0–1.5)
LYMPHOCYTES # BLD AUTO: 0.97 K/UL — LOW (ref 1–3.3)
LYMPHOCYTES # BLD AUTO: 18.8 % — SIGNIFICANT CHANGE UP (ref 13–44)
MCHC RBC-ENTMCNC: 29.3 PG — SIGNIFICANT CHANGE UP (ref 27–34)
MCHC RBC-ENTMCNC: 32.8 GM/DL — SIGNIFICANT CHANGE UP (ref 32–36)
MCV RBC AUTO: 89.2 FL — SIGNIFICANT CHANGE UP (ref 80–100)
MONOCYTES # BLD AUTO: 0.46 K/UL — SIGNIFICANT CHANGE UP (ref 0–0.9)
MONOCYTES NFR BLD AUTO: 8.9 % — SIGNIFICANT CHANGE UP (ref 2–14)
NEUTROPHILS # BLD AUTO: 3.55 K/UL — SIGNIFICANT CHANGE UP (ref 1.8–7.4)
NEUTROPHILS NFR BLD AUTO: 68.6 % — SIGNIFICANT CHANGE UP (ref 43–77)
NRBC # BLD: 0 /100 WBCS — SIGNIFICANT CHANGE UP (ref 0–0)
PLATELET # BLD AUTO: 210 K/UL — SIGNIFICANT CHANGE UP (ref 150–400)
POTASSIUM SERPL-MCNC: 4.3 MMOL/L — SIGNIFICANT CHANGE UP (ref 3.5–5.3)
POTASSIUM SERPL-SCNC: 4.3 MMOL/L — SIGNIFICANT CHANGE UP (ref 3.5–5.3)
PROT SERPL-MCNC: 5.8 G/DL — LOW (ref 6–8.3)
RBC # BLD: 2.97 M/UL — LOW (ref 4.2–5.8)
RBC # FLD: 11.7 % — SIGNIFICANT CHANGE UP (ref 10.3–14.5)
SODIUM SERPL-SCNC: 138 MMOL/L — SIGNIFICANT CHANGE UP (ref 135–145)
SURGICAL PATHOLOGY STUDY: SIGNIFICANT CHANGE UP
WBC # BLD: 5.17 K/UL — SIGNIFICANT CHANGE UP (ref 3.8–10.5)
WBC # FLD AUTO: 5.17 K/UL — SIGNIFICANT CHANGE UP (ref 3.8–10.5)

## 2021-09-15 PROCEDURE — 94002 VENT MGMT INPAT INIT DAY: CPT

## 2021-09-15 PROCEDURE — 97116 GAIT TRAINING THERAPY: CPT

## 2021-09-15 PROCEDURE — 84295 ASSAY OF SERUM SODIUM: CPT

## 2021-09-15 PROCEDURE — C1889: CPT

## 2021-09-15 PROCEDURE — 99232 SBSQ HOSP IP/OBS MODERATE 35: CPT

## 2021-09-15 PROCEDURE — 85610 PROTHROMBIN TIME: CPT

## 2021-09-15 PROCEDURE — 84100 ASSAY OF PHOSPHORUS: CPT

## 2021-09-15 PROCEDURE — 85384 FIBRINOGEN ACTIVITY: CPT

## 2021-09-15 PROCEDURE — 82550 ASSAY OF CK (CPK): CPT

## 2021-09-15 PROCEDURE — 85014 HEMATOCRIT: CPT

## 2021-09-15 PROCEDURE — 82330 ASSAY OF CALCIUM: CPT

## 2021-09-15 PROCEDURE — P9047: CPT

## 2021-09-15 PROCEDURE — 97162 PT EVAL MOD COMPLEX 30 MIN: CPT

## 2021-09-15 PROCEDURE — 82553 CREATINE MB FRACTION: CPT

## 2021-09-15 PROCEDURE — 82803 BLOOD GASES ANY COMBINATION: CPT

## 2021-09-15 PROCEDURE — 82565 ASSAY OF CREATININE: CPT

## 2021-09-15 PROCEDURE — 84484 ASSAY OF TROPONIN QUANT: CPT

## 2021-09-15 PROCEDURE — 83735 ASSAY OF MAGNESIUM: CPT

## 2021-09-15 PROCEDURE — 80053 COMPREHEN METABOLIC PANEL: CPT

## 2021-09-15 PROCEDURE — C1751: CPT

## 2021-09-15 PROCEDURE — 85025 COMPLETE CBC W/AUTO DIFF WBC: CPT

## 2021-09-15 PROCEDURE — 82435 ASSAY OF BLOOD CHLORIDE: CPT

## 2021-09-15 PROCEDURE — 86850 RBC ANTIBODY SCREEN: CPT

## 2021-09-15 PROCEDURE — 83605 ASSAY OF LACTIC ACID: CPT

## 2021-09-15 PROCEDURE — 86901 BLOOD TYPING SEROLOGIC RH(D): CPT

## 2021-09-15 PROCEDURE — 93005 ELECTROCARDIOGRAM TRACING: CPT

## 2021-09-15 PROCEDURE — 88305 TISSUE EXAM BY PATHOLOGIST: CPT

## 2021-09-15 PROCEDURE — 82962 GLUCOSE BLOOD TEST: CPT

## 2021-09-15 PROCEDURE — 71045 X-RAY EXAM CHEST 1 VIEW: CPT

## 2021-09-15 PROCEDURE — 85730 THROMBOPLASTIN TIME PARTIAL: CPT

## 2021-09-15 PROCEDURE — 84132 ASSAY OF SERUM POTASSIUM: CPT

## 2021-09-15 PROCEDURE — C1769: CPT

## 2021-09-15 PROCEDURE — 86900 BLOOD TYPING SEROLOGIC ABO: CPT

## 2021-09-15 PROCEDURE — 85018 HEMOGLOBIN: CPT

## 2021-09-15 PROCEDURE — 82947 ASSAY GLUCOSE BLOOD QUANT: CPT

## 2021-09-15 PROCEDURE — P9045: CPT

## 2021-09-15 PROCEDURE — 86923 COMPATIBILITY TEST ELECTRIC: CPT

## 2021-09-15 PROCEDURE — 97530 THERAPEUTIC ACTIVITIES: CPT

## 2021-09-15 RX ORDER — SPIRONOLACTONE 25 MG/1
1 TABLET, FILM COATED ORAL
Qty: 30 | Refills: 0
Start: 2021-09-15 | End: 2021-10-14

## 2021-09-15 RX ORDER — POLYETHYLENE GLYCOL 3350 17 G/17G
17 POWDER, FOR SOLUTION ORAL
Qty: 170 | Refills: 0
Start: 2021-09-15 | End: 2021-09-24

## 2021-09-15 RX ORDER — FUROSEMIDE 40 MG
1 TABLET ORAL
Qty: 60 | Refills: 0
Start: 2021-09-15 | End: 2021-10-14

## 2021-09-15 RX ORDER — OXYCODONE HYDROCHLORIDE 5 MG/1
1 TABLET ORAL
Qty: 24 | Refills: 0
Start: 2021-09-15 | End: 2021-09-20

## 2021-09-15 RX ORDER — FUROSEMIDE 40 MG
1 TABLET ORAL
Qty: 30 | Refills: 0
Start: 2021-09-15 | End: 2021-10-14

## 2021-09-15 RX ORDER — COLCHICINE 0.6 MG
1 TABLET ORAL
Qty: 60 | Refills: 0
Start: 2021-09-15 | End: 2021-10-14

## 2021-09-15 RX ORDER — SPIRONOLACTONE 25 MG/1
50 TABLET, FILM COATED ORAL
Refills: 0 | Status: DISCONTINUED | OUTPATIENT
Start: 2021-09-15 | End: 2021-09-15

## 2021-09-15 RX ORDER — GABAPENTIN 400 MG/1
1 CAPSULE ORAL
Qty: 90 | Refills: 0
Start: 2021-09-15 | End: 2021-10-14

## 2021-09-15 RX ORDER — METOPROLOL TARTRATE 50 MG
1 TABLET ORAL
Qty: 60 | Refills: 0
Start: 2021-09-15 | End: 2021-10-14

## 2021-09-15 RX ORDER — PANTOPRAZOLE SODIUM 20 MG/1
1 TABLET, DELAYED RELEASE ORAL
Qty: 10 | Refills: 0
Start: 2021-09-15 | End: 2021-09-24

## 2021-09-15 RX ORDER — ASPIRIN/CALCIUM CARB/MAGNESIUM 324 MG
1 TABLET ORAL
Qty: 30 | Refills: 0
Start: 2021-09-15 | End: 2021-10-14

## 2021-09-15 RX ORDER — SPIRONOLACTONE 25 MG/1
1 TABLET, FILM COATED ORAL
Qty: 60 | Refills: 0
Start: 2021-09-15 | End: 2021-10-14

## 2021-09-15 RX ADMIN — Medication 0.6 MILLIGRAM(S): at 06:01

## 2021-09-15 RX ADMIN — Medication 650 MILLIGRAM(S): at 08:49

## 2021-09-15 RX ADMIN — Medication 20 MILLIGRAM(S): at 06:01

## 2021-09-15 RX ADMIN — SODIUM CHLORIDE 3 MILLILITER(S): 9 INJECTION INTRAMUSCULAR; INTRAVENOUS; SUBCUTANEOUS at 05:59

## 2021-09-15 RX ADMIN — Medication 81 MILLIGRAM(S): at 08:22

## 2021-09-15 RX ADMIN — SPIRONOLACTONE 50 MILLIGRAM(S): 25 TABLET, FILM COATED ORAL at 06:02

## 2021-09-15 RX ADMIN — PANTOPRAZOLE SODIUM 40 MILLIGRAM(S): 20 TABLET, DELAYED RELEASE ORAL at 06:02

## 2021-09-15 RX ADMIN — OXYCODONE HYDROCHLORIDE 10 MILLIGRAM(S): 5 TABLET ORAL at 04:30

## 2021-09-15 RX ADMIN — Medication 650 MILLIGRAM(S): at 08:24

## 2021-09-15 RX ADMIN — OXYCODONE HYDROCHLORIDE 10 MILLIGRAM(S): 5 TABLET ORAL at 03:48

## 2021-09-15 RX ADMIN — Medication 50 MILLIGRAM(S): at 06:01

## 2021-09-15 RX ADMIN — GABAPENTIN 100 MILLIGRAM(S): 400 CAPSULE ORAL at 06:02

## 2021-09-15 NOTE — DISCHARGE NOTE PROVIDER - HOSPITAL COURSE
40 y/o male who presents with a chief complaint of fever; echo demonstrates flail posterior leaflet torn chordae and torrential MR.  Pre-op initially for today, but ended up testing positive for COVID, confirmed w/ repeat test. Patient was treated with monoclonal antibodies and discharged home on 8/31 and completed 10 day course of Abx. Now presents to [facility] for Cardiac Surgery.     9/10 s/p Open reconstruction of mitral valve #28 Gold annuloplasty ring and P2 / Clipping, left atrial appendage.   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  Toradol as needed for pain.   Pressor support required on Levo and Vaso gtt --> weaned off.   9/13 Transfer to 2 [facility] Telemetry floor. Continue on current medication regimen.    Disposition: Home once medically cleared   9/14 Increase ambulation, gentle diuresis. Increase beta blocker.   9/15 VSS, pt cleared for discharge home today per Dr. Rodrigues. 40 y/o male who presents with a chief complaint of fever; echo demonstrates flail posterior leaflet torn chordae and torrential MR.  Pre-op initially for today, but ended up testing positive for COVID, confirmed w/ repeat test. Patient was treated with monoclonal antibodies and discharged home on 8/31 and completed 10 day course of Abx. Now presents to [facility] for Cardiac Surgery.     9/10 s/p Open reconstruction of mitral valve #28 Gold annuloplasty ring and P2 / Clipping, left atrial appendage.   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  Toradol as needed for pain.   Pressor support required on Levo and Vaso gtt --> weaned off.   9/13 Transfer to 2 [facility] Telemetry floor. Continue on current medication regimen.    Disposition: Home once medically cleared   9/14 Increase ambulation, gentle diuresis. Increase beta blocker.   9/15 VSS, pt cleared for discharge home today on lasix 20mg and aldactone 50mg daily per Dr. Rodrigues.

## 2021-09-15 NOTE — DISCHARGE NOTE PROVIDER - NSDCPNSUBOBJ_GEN_ALL_CORE
Vital Signs Last 24 Hrs  T(C): 37.2 (15 Sep 2021 05:57), Max: 37.2 (15 Sep 2021 05:57)  T(F): 98.9 (15 Sep 2021 05:57), Max: 98.9 (15 Sep 2021 05:57)  HR: 78 (15 Sep 2021 05:57) (78 - 83)  BP: 126/77 (15 Sep 2021 05:57) (97/63 - 126/77)  BP(mean): 93 (15 Sep 2021 05:57) (93 - 93)  RR: 18 (15 Sep 2021 05:57) (18 - 18)  SpO2: 93% (15 Sep 2021 05:57) (93% - 95%)      PHYSICAL EXAM  Neurology: A&Ox3, NAD  CV : RRR+S1S2  Sternal Wound: MSI CDI ABE, Stable  Lungs: Respirations non-labored, B/L BS CTA  Abdomen: Soft, NT/ND, +BSx4Q, last BM 9/14 (-)N/V/D  : Voiding without difficulty  Extremities: B/L LE no edema, negative calf tenderness, +PP        45 minutes face to face spent on total encounter, patient counseling and discharge instructions.

## 2021-09-15 NOTE — DISCHARGE NOTE PROVIDER - NSDCFUADDAPPT_GEN_ALL_CORE_FT
Follow up with Dr. olvera at CTS office at [facility], call [phone] to confirm appointment.  Follow up with your Cardiologist in 1-2 weeks, please call the office to schedule an appointment.   Follow up with your PCP in 1-2 weeks, call the office to schedule an appointment.  Follow up with Dr. Rodrigues on Thursday 9/23 at 3pm in CTS office at [facility], call [phone] to confirm appointment.  Follow up with your Cardiologist, Dr. Lisker in 2 weeks, please call the office to schedule an appointment.   Follow up with your PCP in 2 weeks, call the office to schedule an appointment.

## 2021-09-15 NOTE — PROGRESS NOTE ADULT - SUBJECTIVE AND OBJECTIVE BOX
HPI: He feels well. Some chest wall pains. Improving. Ready to go home.    The medical history is unchanged.  Review Of Systems:  No orthopnea or leg edema. The remainder of his ROS is unchanged.    Medications:  aspirin enteric coated 81 milliGRAM(s) Oral daily  colchicine 0.6 milliGRAM(s) Oral every 12 hours  enoxaparin Injectable 40 milliGRAM(s) SubCutaneous daily  furosemide    Tablet 20 milliGRAM(s) Oral two times a day  gabapentin 100 milliGRAM(s) Oral every 8 hours  metoprolol tartrate 50 milliGRAM(s) Oral two times a day  oxyCODONE    IR 5 milliGRAM(s) Oral every 4 hours PRN  oxyCODONE    IR 10 milliGRAM(s) Oral every 4 hours PRN  pantoprazole    Tablet 40 milliGRAM(s) Oral before breakfast  polyethylene glycol 3350 17 Gram(s) Oral daily  sodium chloride 0.9% lock flush 3 milliLiter(s) IV Push every 8 hours  spironolactone 50 milliGRAM(s) Oral two times a day    Allergies    No Known Allergies    Intolerances        Vitals:  T(F): 98.9 (09-15-21 @ 05:57), Max: 98.9 (09-15-21 @ 05:57)  HR: 78 (09-15-21 @ 05:57) (78 - 83)  BP: 126/77 (09-15-21 @ 05:57) (97/63 - 126/77)  RR: 18 (09-15-21 @ 05:57) (18 - 18)  SpO2: 93% (09-15-21 @ 05:57) (93% - 95%)  Daily     Daily Weight in k.4 (15 Sep 2021 08:08)  I&O's Summary    14 Sep 2021 07:01  -  15 Sep 2021 07:00  --------------------------------------------------------  IN: 560 mL / OUT: 1950 mL / NET: -1390 mL    15 Sep 2021 07:01  -  15 Sep 2021 08:32  --------------------------------------------------------  IN: 360 mL / OUT: 0 mL / NET: 360 mL        Physical Exam:  Appearance: NAD  HEENT: No icterus or JVD  Cardiovascular: Regular rhythm, normal S1, S2, No murmurs or rubs, No edema  Respiratory: clear to ascultation bilaterally, with decreased breath sounds on the right base. no crackles or wheeze.  Gastrointestinal: +BS, soft  Musculoskeletal: No clubbing  Neurologic: Non-focal, alert and oriented, Mood & affect appropriate  Lymphatic: No lymphadenopathy  Skin: Warm and moist, no cyanosis                          8.7    5.17  )-----------( 210      ( 15 Sep 2021 06:03 )             26.5     -15    138  |  103  |  7   ----------------------------<  92  4.3   |  23  |  0.81    Ca    9.0      15 Sep 2021 06:03  Phos  2.3     -14  Mg     2.3     -    TPro  5.8<L>  /  Alb  3.2<L>  /  TBili  0.3  /  DBili  x   /  AST  14  /  ALT  23  /  AlkPhos  42  09-15    Interpretation of Telemetry: Sr 70 to 90's

## 2021-09-15 NOTE — PROGRESS NOTE ADULT - REASON FOR ADMISSION
MV reconstruction (valvuloplasty + annuloplasty), AMBROCIO clip
Cardiac Surgery
MVR
MVR
MV reconstruction (valvuloplasty + annuloplasty), AMBROCIO clip
MV reconstruction (valvuloplasty + annuloplasty), AMBROCIO clip

## 2021-09-15 NOTE — DISCHARGE NOTE PROVIDER - CARE PROVIDER_API CALL
Jos Rodrigues (MD)  Surgery; Surgical Critical Care; Thoracic and Cardiac Surgery  [address] [place] [address]  Phone: [phone]  Fax: [phone]  Scheduled Appointment: 09/23/2021 03:00 PM    Lisker, Jay J (MD)  Cardiovascular Disease; Internal Medicine  [address]  [place] [address]  Phone: [phone]  Fax: [phone]  Follow Up Time: 2 weeks

## 2021-09-15 NOTE — PROGRESS NOTE ADULT - PROVIDER SPECIALTY LIST ADULT
CT Surgery
Cardiology
Critical Care
Critical Care
Anesthesia
Critical Care
CT Surgery
Cardiology

## 2021-09-15 NOTE — DISCHARGE NOTE PROVIDER - NSDCCPCAREPLAN_GEN_ALL_CORE_FT
PRINCIPAL DISCHARGE DIAGNOSIS  Diagnosis: S/P MVR (mitral valve repair)  Assessment and Plan of Treatment: 1. Daily Shower  2. Weight yourself daily.  3. Regular diet - low fat, low cholesterol, no added salt.  4. Cleanse Midsternal incision daily while showering with warm water and mild soap, pat dry and maintain open to air.   5. Follow Cardiac Surgery Do's and Don'ts discharge instructions.   6. Increase Activity as tolerated.  7. Be sure to let your dentist/physician know about your valve and assess the need for prophylactic antibiotics before any invasive procedures

## 2021-09-15 NOTE — DISCHARGE NOTE NURSING/CASE MANAGEMENT/SOCIAL WORK - PATIENT PORTAL LINK FT
You can access the FollowMyHealth Patient Portal offered by [facility] by registering at the following website: [email or web address]. By joining [facility]’s FollowMyHealth portal, you will also be able to view your health information using other applications (apps) compatible with our system.

## 2021-09-15 NOTE — DISCHARGE NOTE PROVIDER - CARE PROVIDERS DIRECT ADDRESSES
[email or web address]

## 2021-09-15 NOTE — DISCHARGE NOTE PROVIDER - NSDCHC_MEDRECSTATUS_GEN_ALL_CORE
Patient Admission Reconciliation is Not Complete  Discharge Reconciliation is Not Complete Admission Reconciliation is Completed  Discharge Reconciliation is Completed

## 2021-09-15 NOTE — DISCHARGE NOTE PROVIDER - PROVIDER TOKENS
PROVIDER:[TOKEN:[[id number]],SCHEDULEDAPPT:[09/23/2021],SCHEDULEDAPPTTIME:[03:00 PM]],PROVIDER:[TOKEN:[[id number]],FOLLOWUP:[2 weeks]]

## 2021-09-15 NOTE — DISCHARGE NOTE PROVIDER - NSDCMRMEDTOKEN_GEN_ALL_CORE_FT
cefTRIAXone: 2 gram(s) intravenous once a day  last dose 10/12  spironolactone 25 mg oral tablet: 2 tab(s) orally 3 times a day   torsemide 20 mg oral tablet: 1 tab(s) orally once a day   aspirin 81 mg oral delayed release tablet: 1 tab(s) orally once a day  colchicine 0.6 mg oral tablet: 1 tab(s) orally every 12 hours  furosemide 20 mg oral tablet: 1 tab(s) orally 2 times a day  gabapentin 100 mg oral capsule: 1 cap(s) orally every 8 hours  metoprolol tartrate 50 mg oral tablet: 1 tab(s) orally 2 times a day  oxyCODONE 5 mg oral tablet: 1 tab(s) orally every 6 hours, As Needed -Moderate Pain (4 - 6) MDD:4  pantoprazole 40 mg oral delayed release tablet: 1 tab(s) orally once a day (before a meal)  polyethylene glycol 3350 oral powder for reconstitution: 17 gram(s) orally once a day  spironolactone 50 mg oral tablet: 1 tab(s) orally 2 times a day   aspirin 81 mg oral delayed release tablet: 1 tab(s) orally once a day  colchicine 0.6 mg oral tablet: 1 tab(s) orally every 12 hours  furosemide 20 mg oral tablet: 1 tab(s) orally once a day   gabapentin 100 mg oral capsule: 1 cap(s) orally every 8 hours  metoprolol tartrate 50 mg oral tablet: 1 tab(s) orally 2 times a day  oxyCODONE 5 mg oral tablet: 1 tab(s) orally every 6 hours, As Needed -Moderate Pain (4 - 6) MDD:4  pantoprazole 40 mg oral delayed release tablet: 1 tab(s) orally once a day (before a meal)  polyethylene glycol 3350 oral powder for reconstitution: 17 gram(s) orally once a day  spironolactone 50 mg oral tablet: 1 tab(s) orally once a day

## 2021-09-15 NOTE — PROGRESS NOTE ADULT - ASSESSMENT
40 yo M with history of cardiac murmur, presented with orthopnea, fever and newly diagnosed severe MR with signs of CHF on exam.  Also suffered asymptomatic COVID infection.  No epicardial CAD on cath. No evidence of  endocarditis.  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      Now s/p mitral valve reconstruction on Sept. 10 with resection of P2, reconstruction of P1 and P3, annuloplasty band utilizing a 28 Odalis annuloplasty band by Dr. Rodrigues ; s/p atrial appendage clip.    Mild LV dysfunction on post-repair echo report. Postoperateive pleural effusion.    REC:  1.  MVP with torn chordae and severe MR, s/p repair and atrial appendage clip  - continues good progress day 5 post op  - BP remains stable.    2. HFpEF: continue lasix/aldactone orally. Would transition to ACE/ARB for LV remodeling as an outpatient.    3. Continue aspirin therapy post MV repair.  4. Postoperative pericarditis. Improved on ECG. Continue colchicine.  5. Anemia, improved.  d/c planning today. To see me in 2 weeks.

## 2021-09-16 ENCOUNTER — APPOINTMENT (OUTPATIENT)
Dept: CARE COORDINATION | Facility: HOME HEALTH | Age: 41
End: 2021-09-16
Payer: COMMERCIAL

## 2021-09-16 VITALS
OXYGEN SATURATION: 97 % | RESPIRATION RATE: 16 BRPM | HEART RATE: 82 BPM | SYSTOLIC BLOOD PRESSURE: 108 MMHG | DIASTOLIC BLOOD PRESSURE: 64 MMHG

## 2021-09-16 PROCEDURE — 99024 POSTOP FOLLOW-UP VISIT: CPT

## 2021-09-16 RX ORDER — SPIRONOLACTONE 25 MG/1
25 TABLET ORAL EVERY 8 HOURS
Qty: 90 | Refills: 0 | Status: DISCONTINUED | COMMUNITY
Start: 2021-09-03 | End: 2021-09-16

## 2021-09-16 RX ORDER — TORSEMIDE 20 MG/1
20 TABLET ORAL DAILY
Qty: 90 | Refills: 2 | Status: DISCONTINUED | COMMUNITY
Start: 2021-09-03 | End: 2021-09-16

## 2021-09-16 RX ORDER — ASPIRIN ENTERIC COATED TABLETS 81 MG 81 MG/1
81 TABLET, DELAYED RELEASE ORAL DAILY
Refills: 0 | Status: ACTIVE | COMMUNITY
Start: 2021-09-16

## 2021-09-16 NOTE — HISTORY OF PRESENT ILLNESS
[FreeTextEntry1] : 41M s/p MVR L atrial appendage clipping Dr. Rodrigues \par pt with worsening sob prior to dx then surgery delayed 2/2 covid positive results\par pt lives with wife and 2 young sons \par good support\par all questions answered\par

## 2021-09-19 ENCOUNTER — TRANSCRIPTION ENCOUNTER (OUTPATIENT)
Age: 41
End: 2021-09-19

## 2021-09-24 ENCOUNTER — APPOINTMENT (OUTPATIENT)
Dept: CARDIOTHORACIC SURGERY | Facility: CLINIC | Age: 41
End: 2021-09-24
Payer: COMMERCIAL

## 2021-09-24 VITALS
RESPIRATION RATE: 15 BRPM | BODY MASS INDEX: 21.74 KG/M2 | SYSTOLIC BLOOD PRESSURE: 113 MMHG | WEIGHT: 160.5 LBS | TEMPERATURE: 98.2 F | DIASTOLIC BLOOD PRESSURE: 73 MMHG | OXYGEN SATURATION: 98 % | HEART RATE: 81 BPM | HEIGHT: 72 IN

## 2021-09-24 PROCEDURE — 99024 POSTOP FOLLOW-UP VISIT: CPT

## 2021-09-24 RX ORDER — OXYCODONE 5 MG/1
5 TABLET ORAL
Refills: 0 | Status: COMPLETED | COMMUNITY
Start: 2021-09-16 | End: 2021-09-24

## 2021-09-24 RX ORDER — SPIRONOLACTONE 25 MG/1
25 TABLET ORAL DAILY
Refills: 0 | Status: COMPLETED | COMMUNITY
Start: 2021-09-16 | End: 2021-09-24

## 2021-09-24 RX ORDER — PANTOPRAZOLE 40 MG/1
40 TABLET, DELAYED RELEASE ORAL DAILY
Qty: 30 | Refills: 0 | Status: COMPLETED | COMMUNITY
Start: 2021-09-16 | End: 2021-09-24

## 2021-09-24 RX ORDER — FUROSEMIDE 20 MG/1
20 TABLET ORAL DAILY
Refills: 0 | Status: COMPLETED | COMMUNITY
Start: 2021-09-16 | End: 2021-09-24

## 2021-09-24 RX ORDER — METOPROLOL TARTRATE 50 MG/1
50 TABLET, FILM COATED ORAL
Refills: 0 | Status: COMPLETED | COMMUNITY
Start: 2021-09-16 | End: 2021-09-24

## 2021-09-24 RX ORDER — GABAPENTIN 100 MG/1
100 CAPSULE ORAL 3 TIMES DAILY
Refills: 0 | Status: COMPLETED | COMMUNITY
Start: 2021-09-16 | End: 2021-09-24

## 2021-09-27 ENCOUNTER — APPOINTMENT (OUTPATIENT)
Dept: CARDIOLOGY | Facility: CLINIC | Age: 41
End: 2021-09-27
Payer: COMMERCIAL

## 2021-09-27 ENCOUNTER — NON-APPOINTMENT (OUTPATIENT)
Age: 41
End: 2021-09-27

## 2021-09-27 VITALS
DIASTOLIC BLOOD PRESSURE: 68 MMHG | HEIGHT: 72 IN | HEART RATE: 80 BPM | OXYGEN SATURATION: 100 % | SYSTOLIC BLOOD PRESSURE: 100 MMHG | WEIGHT: 160 LBS | BODY MASS INDEX: 21.67 KG/M2

## 2021-09-27 DIAGNOSIS — I34.1 NONRHEUMATIC MITRAL (VALVE) PROLAPSE: ICD-10-CM

## 2021-09-27 PROCEDURE — 93000 ELECTROCARDIOGRAM COMPLETE: CPT

## 2021-09-27 PROCEDURE — 99214 OFFICE O/P EST MOD 30 MIN: CPT

## 2021-09-27 NOTE — HISTORY OF PRESENT ILLNESS
[FreeTextEntry1] : He was admitted to the hospital for heart failure with preserved ejection fraction in the setting of severe mitral regurgitation.\par Coronary angiography showed no significant atherosclerotic disease.\par Prior to surgery he tested positive for Covid 19 (despite being vaccinated) he had a benign course that included monoclonal antibodies.\par On September 10 he underwent an open reconstruction of mitral valve #28 Gold annuloplasty ring and P2 / Clipping, left atrial appendage. Post op Course: Post pericarditis syndrome --> Started on colchicine 0.6 mg PO BID\par His diuretics were recently discontinued and he reports feeling well overall.\par He is able to walk 1 to 1/2 miles around his neighborhood without any difficulty.\par His chest discomfort has been improving steadily.\par No orthopnea, PND or leg edema.\par No bleeding or bruising issues.\par

## 2021-09-27 NOTE — PHYSICAL EXAM
[Well Developed] : well developed [Well Nourished] : well nourished [No Acute Distress] : no acute distress [Normal Conjunctiva] : normal conjunctiva [Normal Venous Pressure] : normal venous pressure [Normal S1, S2] : normal S1, S2 [No Murmur] : no murmur [Clear Lung Fields] : clear lung fields [Good Air Entry] : good air entry [Soft] : abdomen soft [Non Tender] : non-tender [Normal Gait] : normal gait [No Edema] : no edema [No Rash] : no rash [Moves all extremities] : moves all extremities [Alert and Oriented] : alert and oriented [Normal memory] : normal memory

## 2021-09-27 NOTE — DISCUSSION/SUMMARY
[FreeTextEntry1] : He is a 41-year-old with P2 prolapse of his mitral valve with torn chordae and heart failure who underwent a mitral valve repair. No sign of CHF on exam today. MR murmur is gone as well.\par He is tolerating his current dose of aspirin and metoprolol.  ECG shows history AV block and nonspecific T wave abnormalities.\par I have suggested that he continue his current medical regimen for 2 more weeks at which point I would reduce his Toprol to 50 mg daily.  \par Continue colchicine for post-operative pericarditis for the next 3 months.\par Echo in 3 weeks and followup.

## 2021-09-30 LAB
ALBUMIN SERPL ELPH-MCNC: 5 G/DL
ALP BLD-CCNC: 94 U/L
ALT SERPL-CCNC: 24 U/L
ANION GAP SERPL CALC-SCNC: 13 MMOL/L
AST SERPL-CCNC: 19 U/L
BASOPHILS # BLD AUTO: 0.08 K/UL
BASOPHILS NFR BLD AUTO: 1.2 %
BILIRUB SERPL-MCNC: 0.2 MG/DL
BUN SERPL-MCNC: 21 MG/DL
CALCIUM SERPL-MCNC: 9.9 MG/DL
CHLORIDE SERPL-SCNC: 101 MMOL/L
CO2 SERPL-SCNC: 26 MMOL/L
CREAT SERPL-MCNC: 1.01 MG/DL
EOSINOPHIL # BLD AUTO: 0.27 K/UL
EOSINOPHIL NFR BLD AUTO: 4.1 %
GLUCOSE SERPL-MCNC: 84 MG/DL
HCT VFR BLD CALC: 40.4 %
HGB BLD-MCNC: 12.4 G/DL
IMM GRANULOCYTES NFR BLD AUTO: 0.3 %
LYMPHOCYTES # BLD AUTO: 1.49 K/UL
LYMPHOCYTES NFR BLD AUTO: 22.8 %
MAN DIFF?: NORMAL
MCHC RBC-ENTMCNC: 28.7 PG
MCHC RBC-ENTMCNC: 30.7 GM/DL
MCV RBC AUTO: 93.5 FL
MONOCYTES # BLD AUTO: 0.41 K/UL
MONOCYTES NFR BLD AUTO: 6.3 %
NEUTROPHILS # BLD AUTO: 4.27 K/UL
NEUTROPHILS NFR BLD AUTO: 65.3 %
NT-PROBNP SERPL-MCNC: 810 PG/ML
PLATELET # BLD AUTO: 444 K/UL
POTASSIUM SERPL-SCNC: 5.1 MMOL/L
PROT SERPL-MCNC: 7.1 G/DL
RBC # BLD: 4.32 M/UL
RBC # FLD: 12.6 %
SODIUM SERPL-SCNC: 140 MMOL/L
WBC # FLD AUTO: 6.54 K/UL

## 2021-10-14 ENCOUNTER — TRANSCRIPTION ENCOUNTER (OUTPATIENT)
Age: 41
End: 2021-10-14

## 2021-10-15 ENCOUNTER — APPOINTMENT (OUTPATIENT)
Dept: CARDIOLOGY | Facility: CLINIC | Age: 41
End: 2021-10-15
Payer: COMMERCIAL

## 2021-10-15 ENCOUNTER — NON-APPOINTMENT (OUTPATIENT)
Age: 41
End: 2021-10-15

## 2021-10-15 VITALS
HEART RATE: 77 BPM | HEIGHT: 72 IN | WEIGHT: 163 LBS | DIASTOLIC BLOOD PRESSURE: 68 MMHG | OXYGEN SATURATION: 97 % | BODY MASS INDEX: 22.08 KG/M2 | SYSTOLIC BLOOD PRESSURE: 104 MMHG

## 2021-10-15 DIAGNOSIS — I34.0 NONRHEUMATIC MITRAL (VALVE) INSUFFICIENCY: ICD-10-CM

## 2021-10-15 DIAGNOSIS — I97.0 POSTCARDIOTOMY SYNDROME: ICD-10-CM

## 2021-10-15 PROCEDURE — 99214 OFFICE O/P EST MOD 30 MIN: CPT

## 2021-10-15 PROCEDURE — 93000 ELECTROCARDIOGRAM COMPLETE: CPT

## 2021-10-15 NOTE — DISCUSSION/SUMMARY
[FreeTextEntry1] : He is a 41-year-old with P2 prolapse of his mitral valve with torn chordae and heart failure who underwent a mitral valve repair on September 10, 2021.  Preoperative coronary angiography was unremarkable.\par No sign of CHF on exam today. MR murmur is gone as well.\par He is tolerating his current dose of aspirin and metoprolol.  ECG shows AV block and nonspecific T wave abnormalities. Unchanged.\par I have suggested that he continue his current medical regimen.\par We will schedule an echocardiogram to monitor his LV systolic function.  If his EF is normal I would discontinue his beta-blocker.\par Continue colchicine for post-operative pericarditis for the next 2 months.\par Follow-up with me in 2 months as well.

## 2021-10-15 NOTE — HISTORY OF PRESENT ILLNESS
[FreeTextEntry1] : He reports feeling well overall.  He has been taking 50 mg of Toprol without any lightheadedness, dizziness or syncope.\par He has been physically active and has been able to walk for up to 6 miles without any difficulty.\par \par Prior:\par He was admitted to the hospital for heart failure with preserved ejection fraction in the setting of severe mitral regurgitation.\par Coronary angiography showed no significant atherosclerotic disease.\par Prior to surgery he tested positive for Covid 19 (despite being vaccinated) he had a benign course that included monoclonal antibodies.\par On September 10 he underwent an open reconstruction of mitral valve #28 Gold annuloplasty ring and P2 / Clipping, left atrial appendage. Post op Course: Post pericarditis syndrome --> Started on colchicine 0.6 mg PO BID\par His diuretics were recently discontinued and he reports feeling well overall.\par He is able to walk 1 to 1/2 miles around his neighborhood without any difficulty.\par His chest discomfort has been improving steadily.\par No orthopnea, PND or leg edema.\par No bleeding or bruising issues.\par

## 2021-11-22 ENCOUNTER — APPOINTMENT (OUTPATIENT)
Dept: CARDIOLOGY | Facility: CLINIC | Age: 41
End: 2021-11-22
Payer: COMMERCIAL

## 2021-11-22 PROCEDURE — 93306 TTE W/DOPPLER COMPLETE: CPT

## 2021-12-03 ENCOUNTER — LABORATORY RESULT (OUTPATIENT)
Age: 41
End: 2021-12-03

## 2021-12-03 ENCOUNTER — APPOINTMENT (OUTPATIENT)
Dept: INTERNAL MEDICINE | Facility: CLINIC | Age: 41
End: 2021-12-03
Payer: COMMERCIAL

## 2021-12-03 ENCOUNTER — NON-APPOINTMENT (OUTPATIENT)
Age: 41
End: 2021-12-03

## 2021-12-03 VITALS — DIASTOLIC BLOOD PRESSURE: 70 MMHG | SYSTOLIC BLOOD PRESSURE: 110 MMHG | HEART RATE: 72 BPM | RESPIRATION RATE: 14 BRPM

## 2021-12-03 VITALS — HEIGHT: 72 IN | BODY MASS INDEX: 23.03 KG/M2 | WEIGHT: 170 LBS

## 2021-12-03 DIAGNOSIS — Z82.49 FAMILY HISTORY OF ISCHEMIC HEART DISEASE AND OTHER DISEASES OF THE CIRCULATORY SYSTEM: ICD-10-CM

## 2021-12-03 DIAGNOSIS — Z78.9 OTHER SPECIFIED HEALTH STATUS: ICD-10-CM

## 2021-12-03 DIAGNOSIS — Z80.3 FAMILY HISTORY OF MALIGNANT NEOPLASM OF BREAST: ICD-10-CM

## 2021-12-03 DIAGNOSIS — Z86.19 PERSONAL HISTORY OF OTHER INFECTIOUS AND PARASITIC DISEASES: ICD-10-CM

## 2021-12-03 DIAGNOSIS — Z87.891 PERSONAL HISTORY OF NICOTINE DEPENDENCE: ICD-10-CM

## 2021-12-03 DIAGNOSIS — Z23 ENCOUNTER FOR IMMUNIZATION: ICD-10-CM

## 2021-12-03 PROCEDURE — 99203 OFFICE O/P NEW LOW 30 MIN: CPT | Mod: 25

## 2021-12-03 PROCEDURE — G0008: CPT

## 2021-12-03 PROCEDURE — 99385 PREV VISIT NEW AGE 18-39: CPT | Mod: 25

## 2021-12-03 PROCEDURE — 90686 IIV4 VACC NO PRSV 0.5 ML IM: CPT

## 2021-12-03 PROCEDURE — 36415 COLL VENOUS BLD VENIPUNCTURE: CPT

## 2021-12-03 PROCEDURE — G0444 DEPRESSION SCREEN ANNUAL: CPT | Mod: 59

## 2021-12-03 PROCEDURE — 93000 ELECTROCARDIOGRAM COMPLETE: CPT | Mod: 59

## 2021-12-08 NOTE — HEALTH RISK ASSESSMENT
[Yes] : Yes [0] : 2) Feeling down, depressed, or hopeless: Not at all (0) [PHQ-2 Negative - No further assessment needed] : PHQ-2 Negative - No further assessment needed [HIV test declined] : HIV test declined [Hepatitis C test declined] : Hepatitis C test declined [With Significant Other] : lives with significant other [Employed] : employed [] :  [# Of Children ___] : has [unfilled] children [Sexually Active] : sexually active [Fully functional (bathing, dressing, toileting, transferring, walking, feeding)] : Fully functional (bathing, dressing, toileting, transferring, walking, feeding) [Fully functional (using the telephone, shopping, preparing meals, housekeeping, doing laundry, using] : Fully functional and needs no help or supervision to perform IADLs (using the telephone, shopping, preparing meals, housekeeping, doing laundry, using transportation, managing medications and managing finances) [] : No [No falls in past year] : Patient reported no falls in the past year [YearQuit] : 2010 [de-identified] : skiing [BPB6Bbous] : 0 [High Risk Behavior] : no high risk behavior [Reports changes in hearing] : Reports no changes in hearing [Reports changes in vision] : Reports no changes in vision [Reports changes in dental health] : Reports no changes in dental health

## 2021-12-08 NOTE — HISTORY OF PRESENT ILLNESS
[FreeTextEntry1] : MAX BOWER is a 41 year old male  presents for an annual physical. Patient is also here for f/u of chronic medical conditions, which have been stable on medications. These include mitral valve repair\par  [de-identified] : No acute issues\par no CP/SOB/SON/palpitations

## 2021-12-08 NOTE — PHYSICAL EXAM
[No Acute Distress] : no acute distress [Well Nourished] : well nourished [Well Developed] : well developed [Well-Appearing] : well-appearing [Normal Sclera/Conjunctiva] : normal sclera/conjunctiva [PERRL] : pupils equal round and reactive to light [EOMI] : extraocular movements intact [Normal Outer Ear/Nose] : the outer ears and nose were normal in appearance [Normal Oropharynx] : the oropharynx was normal [No JVD] : no jugular venous distention [No Lymphadenopathy] : no lymphadenopathy [Supple] : supple [Thyroid Normal, No Nodules] : the thyroid was normal and there were no nodules present [No Respiratory Distress] : no respiratory distress  [No Accessory Muscle Use] : no accessory muscle use [Clear to Auscultation] : lungs were clear to auscultation bilaterally [Normal Rate] : normal rate  [Regular Rhythm] : with a regular rhythm [Normal S1, S2] : normal S1 and S2 [No Murmur] : no murmur heard [No Carotid Bruits] : no carotid bruits [No Abdominal Bruit] : a ~M bruit was not heard ~T in the abdomen [No Varicosities] : no varicosities [Pedal Pulses Present] : the pedal pulses are present [No Edema] : there was no peripheral edema [No Palpable Aorta] : no palpable aorta [No Extremity Clubbing/Cyanosis] : no extremity clubbing/cyanosis [Soft] : abdomen soft [Non Tender] : non-tender [Non-distended] : non-distended [No Masses] : no abdominal mass palpated [No HSM] : no HSM [Normal Bowel Sounds] : normal bowel sounds [Normal Posterior Cervical Nodes] : no posterior cervical lymphadenopathy [Normal Anterior Cervical Nodes] : no anterior cervical lymphadenopathy [No CVA Tenderness] : no CVA  tenderness [No Spinal Tenderness] : no spinal tenderness [No Joint Swelling] : no joint swelling [Grossly Normal Strength/Tone] : grossly normal strength/tone [No Rash] : no rash [Coordination Grossly Intact] : coordination grossly intact [No Focal Deficits] : no focal deficits [Normal Gait] : normal gait [Deep Tendon Reflexes (DTR)] : deep tendon reflexes were 2+ and symmetric [Normal Affect] : the affect was normal [Normal Insight/Judgement] : insight and judgment were intact [de-identified] : sternal scar

## 2021-12-08 NOTE — ASSESSMENT
[FreeTextEntry1] : Blood work was drawn and sent to the lab today. The patient has been instructed to call the office next week to discuss today's lab work.\par \par Flu vaccine given\par \par Routine health counselling provided\par \par f/u with Cardiology as scheduled.\par \par Annual CC

## 2021-12-17 ENCOUNTER — APPOINTMENT (OUTPATIENT)
Dept: CARDIOLOGY | Facility: CLINIC | Age: 41
End: 2021-12-17
Payer: COMMERCIAL

## 2021-12-17 ENCOUNTER — NON-APPOINTMENT (OUTPATIENT)
Age: 41
End: 2021-12-17

## 2021-12-17 VITALS
SYSTOLIC BLOOD PRESSURE: 106 MMHG | WEIGHT: 174 LBS | HEIGHT: 72 IN | OXYGEN SATURATION: 100 % | HEART RATE: 71 BPM | DIASTOLIC BLOOD PRESSURE: 78 MMHG | RESPIRATION RATE: 17 BRPM | BODY MASS INDEX: 23.57 KG/M2

## 2021-12-17 PROCEDURE — 99213 OFFICE O/P EST LOW 20 MIN: CPT

## 2021-12-17 PROCEDURE — 93000 ELECTROCARDIOGRAM COMPLETE: CPT

## 2021-12-17 NOTE — HISTORY OF PRESENT ILLNESS
[FreeTextEntry1] : He reports feeling well in general. Tolerating metoprolol at 50 mg without any lightheadedness, dizziness or syncope. He remains active by walking in the city and can walk 5 miles a day without any difficulty.\par Recently saw Dr. Myles to establish care. \par \par \par Prior:\par He reports feeling well overall.  He has been taking 50 mg of Toprol without any lightheadedness, dizziness or syncope.\par He has been physically active and has been able to walk for up to 6 miles without any difficulty.\par \par Prior:\par He was admitted to the hospital for heart failure with preserved ejection fraction in the setting of severe mitral regurgitation.\par Coronary angiography showed no significant atherosclerotic disease.\par Prior to surgery he tested positive for Covid 19 (despite being vaccinated) he had a benign course that included monoclonal antibodies.\par On September 10 he underwent an open reconstruction of mitral valve #28 Gold annuloplasty ring and P2 / Clipping, left atrial appendage. Post op Course: Post pericarditis syndrome --> Started on colchicine 0.6 mg PO BID\par His diuretics were recently discontinued and he reports feeling well overall.\par He is able to walk 1 to 1/2 miles around his neighborhood without any difficulty.\par His chest discomfort has been improving steadily.\par No orthopnea, PND or leg edema.\par No bleeding or bruising issues.\par

## 2022-01-24 LAB
25(OH)D3 SERPL-MCNC: 20.6 NG/ML
ALBUMIN SERPL ELPH-MCNC: 5.2 G/DL
ALP BLD-CCNC: 81 U/L
ALT SERPL-CCNC: 27 U/L
ANION GAP SERPL CALC-SCNC: 18 MMOL/L
APPEARANCE: CLEAR
AST SERPL-CCNC: 18 U/L
B BURGDOR IGG+IGM SER QL IB: NORMAL
BASOPHILS # BLD AUTO: 0.01 K/UL
BASOPHILS NFR BLD AUTO: 0.2 %
BILIRUB SERPL-MCNC: 0.4 MG/DL
BILIRUBIN URINE: NEGATIVE
BLOOD URINE: NEGATIVE
BUN SERPL-MCNC: 22 MG/DL
CALCIUM SERPL-MCNC: 10.1 MG/DL
CHLORIDE SERPL-SCNC: 104 MMOL/L
CHOLEST SERPL-MCNC: 198 MG/DL
CO2 SERPL-SCNC: 22 MMOL/L
COLOR: YELLOW
CREAT SERPL-MCNC: 1.06 MG/DL
EOSINOPHIL # BLD AUTO: 0.11 K/UL
EOSINOPHIL NFR BLD AUTO: 1.7 %
ESTIMATED AVERAGE GLUCOSE: 103 MG/DL
FOLATE SERPL-MCNC: 9.7 NG/ML
GLUCOSE QUALITATIVE U: NEGATIVE
GLUCOSE SERPL-MCNC: 92 MG/DL
HBA1C MFR BLD HPLC: 5.2 %
HCT VFR BLD CALC: 45.4 %
HDLC SERPL-MCNC: 45 MG/DL
HGB BLD-MCNC: 14.7 G/DL
IMM GRANULOCYTES NFR BLD AUTO: 0.2 %
KETONES URINE: NEGATIVE
LDLC SERPL CALC-MCNC: 126 MG/DL
LEUKOCYTE ESTERASE URINE: NEGATIVE
LYMPHOCYTES # BLD AUTO: 1.88 K/UL
LYMPHOCYTES NFR BLD AUTO: 29.8 %
MAGNESIUM SERPL-MCNC: 2.4 MG/DL
MAN DIFF?: NORMAL
MCHC RBC-ENTMCNC: 28.4 PG
MCHC RBC-ENTMCNC: 32.4 GM/DL
MCV RBC AUTO: 87.8 FL
MONOCYTES # BLD AUTO: 0.58 K/UL
MONOCYTES NFR BLD AUTO: 9.2 %
NEUTROPHILS # BLD AUTO: 3.72 K/UL
NEUTROPHILS NFR BLD AUTO: 58.9 %
NITRITE URINE: NEGATIVE
NONHDLC SERPL-MCNC: 153 MG/DL
PH URINE: 6.5
PLATELET # BLD AUTO: 271 K/UL
POTASSIUM SERPL-SCNC: 4 MMOL/L
PROT SERPL-MCNC: 7.3 G/DL
PROTEIN URINE: NORMAL
RBC # BLD: 5.17 M/UL
RBC # FLD: 13.1 %
SODIUM SERPL-SCNC: 144 MMOL/L
SPECIFIC GRAVITY URINE: 1.03
T4 FREE SERPL-MCNC: 1.5 NG/DL
T4 SERPL-MCNC: 6.9 UG/DL
TRIGL SERPL-MCNC: 132 MG/DL
TSH SERPL-ACNC: 2.28 UIU/ML
URATE SERPL-MCNC: 7.5 MG/DL
UROBILINOGEN URINE: NORMAL
VIT B12 SERPL-MCNC: 566 PG/ML
WBC # FLD AUTO: 6.31 K/UL

## 2022-03-20 NOTE — BRIEF OPERATIVE NOTE - PRIMARY SURGEON
Alert-The patient is alert, awake and responds to voice. The patient is oriented to time, place, and person. The triage nurse is able to obtain subjective information.
Dr Rodrigues

## 2022-09-13 NOTE — PATIENT PROFILE ADULT - MEDICATIONS/VISITS
Problem: Occupational Therapy  Goal: Occupational Therapy Goal  Description: Goals to be met by: 9/27/22     Patient will increase functional independence with ADLs by performing:    Feeding with [place].  UE Dressing with [place].  LE Dressing with [place].  Grooming while standing at sink with [place].  Toileting from toilet with [place] for hygiene and clothing management.   Toilet transfer to toilet with [place].  Pt will engage in functional mobility to simulate household distances in order to maximize functional activity tolerance required for engagement in occupations of choice with supervision.    Outcome: Ongoing, Progressing      no

## 2022-09-29 ENCOUNTER — APPOINTMENT (OUTPATIENT)
Dept: CARDIOLOGY | Facility: CLINIC | Age: 42
End: 2022-09-29

## 2022-09-29 ENCOUNTER — NON-APPOINTMENT (OUTPATIENT)
Age: 42
End: 2022-09-29

## 2022-09-29 VITALS
HEART RATE: 82 BPM | OXYGEN SATURATION: 97 % | BODY MASS INDEX: 22.79 KG/M2 | SYSTOLIC BLOOD PRESSURE: 110 MMHG | WEIGHT: 168 LBS | DIASTOLIC BLOOD PRESSURE: 76 MMHG

## 2022-09-29 PROCEDURE — 99214 OFFICE O/P EST MOD 30 MIN: CPT | Mod: 25

## 2022-09-29 PROCEDURE — 93000 ELECTROCARDIOGRAM COMPLETE: CPT

## 2022-09-29 RX ORDER — COLCHICINE 0.6 MG/1
0.6 TABLET ORAL TWICE DAILY
Qty: 120 | Refills: 0 | Status: DISCONTINUED | COMMUNITY
Start: 2021-09-16 | End: 2022-09-29

## 2022-09-29 NOTE — DISCUSSION/SUMMARY
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  Postoperative period was complicated by pericarditis which has resolved.  Preoperative coronary angiography was unremarkable.\par ECG shows normal sinus rhythm with unchanged first-degree AV block.\par \par EF was in the Low normal range, high normal LA size, MVR without regurgitation seen in November 2021.\par He is tolerating his current dose of metoprolol.  No changes have been made.\par I encouraged him to restart daily aspirin therapy given his previous mitral valve repair.\par  \par Follow-up with me in 12 months.  Echo to be done done in November to monitor LA size and mitral valve repair. [EKG obtained to assist in diagnosis and management of assessed problem(s)] : EKG obtained to assist in diagnosis and management of assessed problem(s)

## 2022-09-29 NOTE — HISTORY OF PRESENT ILLNESS
[FreeTextEntry1] : He reports feeling well overall and is able to go about his usual activities without any difficulty.\par He has been able to exercise without limitations and denies any exertional chest pains or shortness of breath.\par He has no lightheadedness, dizziness or syncope.\par He is taking his metoprolol fairly consistently, but he has stopped taking his aspirin for unclear reasons.\par Recent LDL was 126 mg/dL.\par \par Prior:\par He reports feeling well in general. Tolerating metoprolol at 50 mg without any lightheadedness, dizziness or syncope. He remains active by walking in the city and can walk 5 miles a day without any difficulty.\par Recently saw Dr. Myles to establish care. \par \par \par Prior:\par He reports feeling well overall.  He has been taking 50 mg of Toprol without any lightheadedness, dizziness or syncope.\par He has been physically active and has been able to walk for up to 6 miles without any difficulty.\par \par Prior:\par He was admitted to the hospital for heart failure with preserved ejection fraction in the setting of severe mitral regurgitation.\par Coronary angiography showed no significant atherosclerotic disease.\par Prior to surgery he tested positive for Covid 19 (despite being vaccinated) he had a benign course that included monoclonal antibodies.\par On September 10 he underwent an open reconstruction of mitral valve #28 Gold annuloplasty ring and P2 / Clipping, left atrial appendage. Post op Course: Post pericarditis syndrome --> Started on colchicine 0.6 mg PO BID\par His diuretics were recently discontinued and he reports feeling well overall.\par He is able to walk 1 to 1/2 miles around his neighborhood without any difficulty.\par His chest discomfort has been improving steadily.\par No orthopnea, PND or leg edema.\par No bleeding or bruising issues.\par

## 2022-09-29 NOTE — REVIEW OF SYSTEMS
[SOB] : no shortness of breath [Dyspnea on exertion] : not dyspnea during exertion [Negative] : Heme/Lymph

## 2023-02-16 ENCOUNTER — APPOINTMENT (OUTPATIENT)
Dept: INTERNAL MEDICINE | Facility: CLINIC | Age: 43
End: 2023-02-16
Payer: COMMERCIAL

## 2023-02-16 ENCOUNTER — NON-APPOINTMENT (OUTPATIENT)
Age: 43
End: 2023-02-16

## 2023-02-16 VITALS — BODY MASS INDEX: 22.48 KG/M2 | HEIGHT: 72 IN | WEIGHT: 166 LBS

## 2023-02-16 VITALS — DIASTOLIC BLOOD PRESSURE: 72 MMHG | SYSTOLIC BLOOD PRESSURE: 108 MMHG | HEART RATE: 78 BPM | RESPIRATION RATE: 14 BRPM

## 2023-02-16 PROCEDURE — 99396 PREV VISIT EST AGE 40-64: CPT | Mod: 25

## 2023-02-16 PROCEDURE — 99213 OFFICE O/P EST LOW 20 MIN: CPT | Mod: 25

## 2023-02-16 PROCEDURE — G0444 DEPRESSION SCREEN ANNUAL: CPT | Mod: 59

## 2023-02-16 PROCEDURE — 93000 ELECTROCARDIOGRAM COMPLETE: CPT | Mod: 59

## 2023-02-16 PROCEDURE — 36415 COLL VENOUS BLD VENIPUNCTURE: CPT

## 2023-02-24 NOTE — ASSESSMENT
[FreeTextEntry1] : Blood work was drawn and sent to the lab today. The patient has been instructed to call the office next week to discuss today's lab work.\par \par Routine health counselling provided\par f/u with Cardiology as scheduled.\par needs echocardiogram, pt advised\par \par Annual CC

## 2023-02-24 NOTE — HEALTH RISK ASSESSMENT
[Yes] : Yes [No falls in past year] : Patient reported no falls in the past year [0] : 2) Feeling down, depressed, or hopeless: Not at all (0) [PHQ-2 Negative - No further assessment needed] : PHQ-2 Negative - No further assessment needed [de-identified] : skiing [[id number]] : 0 [HIV test declined] : HIV test declined [Hepatitis C test declined] : Hepatitis C test declined [With Significant Other] : lives with significant other [Employed] : employed [] :  [# Of Children ___] : has [unfilled] children [Sexually Active] : sexually active [High Risk Behavior] : no high risk behavior [Fully functional (bathing, dressing, toileting, transferring, walking, feeding)] : Fully functional (bathing, dressing, toileting, transferring, walking, feeding) [Fully functional (using the telephone, shopping, preparing meals, housekeeping, doing laundry, using] : Fully functional and needs no help or supervision to perform IADLs (using the telephone, shopping, preparing meals, housekeeping, doing laundry, using transportation, managing medications and managing finances) [Reports changes in hearing] : Reports no changes in hearing [Reports changes in vision] : Reports no changes in vision [Reports changes in dental health] : Reports no changes in dental health

## 2023-02-24 NOTE — PHYSICAL EXAM
[No Acute Distress] : no acute distress [Well Nourished] : well nourished [Well Developed] : well developed [Well-Appearing] : well-appearing [Normal Sclera/Conjunctiva] : normal sclera/conjunctiva [PERRL] : pupils equal round and reactive to light [EOMI] : extraocular movements intact [Normal Outer Ear/Nose] : the outer ears and nose were normal in appearance [Normal Oropharynx] : the oropharynx was normal [No JVD] : no jugular venous distention [No Lymphadenopathy] : no lymphadenopathy [Supple] : supple [Thyroid Normal, No Nodules] : the thyroid was normal and there were no nodules present [No Respiratory Distress] : no respiratory distress  [No Accessory Muscle Use] : no accessory muscle use [Clear to Auscultation] : lungs were clear to auscultation bilaterally [Normal Rate] : normal rate  [Regular Rhythm] : with a regular rhythm [Normal S1, S2] : normal S1 and S2 [No Murmur] : no murmur heard [No Carotid Bruits] : no carotid bruits [No Abdominal Bruit] : a ~M bruit was not heard ~T in the abdomen [No Varicosities] : no varicosities [Pedal Pulses Present] : the pedal pulses are present [No Edema] : there was no peripheral edema [No Palpable Aorta] : no palpable aorta [No Extremity Clubbing/Cyanosis] : no extremity clubbing/cyanosis [Soft] : abdomen soft [Non Tender] : non-tender [Non-distended] : non-distended [No Masses] : no abdominal mass palpated [No HSM] : no HSM [Normal Bowel Sounds] : normal bowel sounds [No CVA Tenderness] : no CVA  tenderness [No Spinal Tenderness] : no spinal tenderness [No Joint Swelling] : no joint swelling [Grossly Normal Strength/Tone] : grossly normal strength/tone [No Rash] : no rash [Coordination Grossly Intact] : coordination grossly intact [No Focal Deficits] : no focal deficits [Normal Gait] : normal gait [Deep Tendon Reflexes (DTR)] : deep tendon reflexes were 2+ and symmetric [Normal Affect] : the affect was normal [Normal Insight/Judgement] : insight and judgment were intact [de-identified] : sternal scar

## 2023-02-24 NOTE — HISTORY OF PRESENT ILLNESS
[FreeTextEntry1] : MAX BOWER is a 42 year old male  presents for an annual physical. Patient is also here for f/u of chronic medical conditions, which have been stable on medications. These include mitral valve repair\par  [de-identified] : No acute issues\par no CP/SOB/SON/palpitations

## 2023-03-27 ENCOUNTER — APPOINTMENT (OUTPATIENT)
Dept: CARDIOLOGY | Facility: CLINIC | Age: 43
End: 2023-03-27
Payer: COMMERCIAL

## 2023-03-27 VITALS
SYSTOLIC BLOOD PRESSURE: 100 MMHG | HEART RATE: 66 BPM | WEIGHT: 171 LBS | DIASTOLIC BLOOD PRESSURE: 70 MMHG | HEIGHT: 72 IN | BODY MASS INDEX: 23.16 KG/M2 | OXYGEN SATURATION: 98 %

## 2023-03-27 PROCEDURE — 99214 OFFICE O/P EST MOD 30 MIN: CPT | Mod: 25

## 2023-03-27 PROCEDURE — 93306 TTE W/DOPPLER COMPLETE: CPT

## 2023-03-27 PROCEDURE — 93000 ELECTROCARDIOGRAM COMPLETE: CPT

## 2023-03-27 NOTE — DISCUSSION/SUMMARY
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  Postoperative period was complicated by pericarditis which has resolved.  \par ECG in Feb shows normal sinus rhythm with unchanged first-degree AV block.\par \par Echo: EF was in the Low normal range, high normal LA size, MVR without regurgitation seen today.\par He is tolerating his current dose of metoprolol.  No changes have been made.\par Continue daily aspirin.\par Abx prophylaxis reviewed.\par Follow-up with me in 12 months.  Echo to be done done in November to monitor LA size and mitral valve repair. [EKG obtained to assist in diagnosis and management of assessed problem(s)] : EKG obtained to assist in diagnosis and management of assessed problem(s)

## 2023-03-27 NOTE — HISTORY OF PRESENT ILLNESS
[FreeTextEntry1] : He has been doing well. Skiing every weekend without difficulty.\par He is take his medications as directed.\par abx prior to dental procedures.\par \par Prior:\par He reports feeling well overall and is able to go about his usual activities without any difficulty.\par He has been able to exercise without limitations and denies any exertional chest pains or shortness of breath.\par He has no lightheadedness, dizziness or syncope.\par He is taking his metoprolol fairly consistently, but he has stopped taking his aspirin for unclear reasons.\par Recent LDL was 126 mg/dL.\par \par Prior:\par He reports feeling well in general. Tolerating metoprolol at 50 mg without any lightheadedness, dizziness or syncope. He remains active by walking in the city and can walk 5 miles a day without any difficulty.\par Recently saw Dr. Myles to establish care. \par \par \par Prior:\par He reports feeling well overall.  He has been taking 50 mg of Toprol without any lightheadedness, dizziness or syncope.\par He has been physically active and has been able to walk for up to 6 miles without any difficulty.\par \par Prior:\par He was admitted to the hospital for heart failure with preserved ejection fraction in the setting of severe mitral regurgitation.\par Coronary angiography showed no significant atherosclerotic disease.\par Prior to surgery he tested positive for Covid 19 (despite being vaccinated) he had a benign course that included monoclonal antibodies.\par On September 10 he underwent an open reconstruction of mitral valve #28 Gold annuloplasty ring and P2 / Clipping, left atrial appendage. Post op Course: Post pericarditis syndrome --> Started on colchicine 0.6 mg PO BID\par His diuretics were recently discontinued and he reports feeling well overall.\par He is able to walk 1 to 1/2 miles around his neighborhood without any difficulty.\par His chest discomfort has been improving steadily.\par No orthopnea, PND or leg edema.\par No bleeding or bruising issues.\par

## 2023-03-30 NOTE — PROGRESS NOTE ADULT - ASSESSMENT
This is a 40 y/o male with no significant PMHx who presented to Urgent care for fevers, tmax 104 x 48hrs, on cardiac exam found to have + Murmur.  Seen by Dr. Lisker ( Cardiologist) this am transthoracic ECHO reveals severe Flail posterior mitral leaflet - appears to be a torn chordae attached. Torrential, severe, anteriorly-directed mitral regurgitation. 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  Received Pfizer COVID Vaccinex2 last dose April -Denies travel, Covid, sick contacts CP/PALPs and  dental work. Denies N/V/CP.  Endorses orthopnea.   Admitted to CTS for  further management.      Winlevi Pregnancy And Lactation Text: This medication is considered safe during pregnancy and breastfeeding.

## 2023-05-07 ENCOUNTER — RX RENEWAL (OUTPATIENT)
Age: 43
End: 2023-05-07

## 2023-06-22 LAB
25(OH)D3 SERPL-MCNC: 20.1 NG/ML
ALBUMIN SERPL ELPH-MCNC: 4.6 G/DL
ALP BLD-CCNC: 82 U/L
ALT SERPL-CCNC: 15 U/L
ANION GAP SERPL CALC-SCNC: 14 MMOL/L
APPEARANCE: CLEAR
AST SERPL-CCNC: 13 U/L
BASOPHILS # BLD AUTO: 0.04 K/UL
BASOPHILS NFR BLD AUTO: 0.6 %
BILIRUB SERPL-MCNC: 0.2 MG/DL
BILIRUBIN URINE: NEGATIVE
BLOOD URINE: NEGATIVE
BUN SERPL-MCNC: 21 MG/DL
CALCIUM SERPL-MCNC: 10.1 MG/DL
CHLORIDE SERPL-SCNC: 102 MMOL/L
CHOLEST SERPL-MCNC: 180 MG/DL
CO2 SERPL-SCNC: 24 MMOL/L
COLOR: YELLOW
CREAT SERPL-MCNC: 1.11 MG/DL
EGFR: 86 ML/MIN/1.73M2
EOSINOPHIL # BLD AUTO: 0.09 K/UL
EOSINOPHIL NFR BLD AUTO: 1.4 %
FOLATE SERPL-MCNC: 5.2 NG/ML
GLUCOSE QUALITATIVE U: NEGATIVE
GLUCOSE SERPL-MCNC: 90 MG/DL
HCT VFR BLD CALC: 43.6 %
HDLC SERPL-MCNC: 44 MG/DL
HGB BLD-MCNC: 14.5 G/DL
IMM GRANULOCYTES NFR BLD AUTO: 0.3 %
KETONES URINE: NEGATIVE
LDLC SERPL CALC-MCNC: 101 MG/DL
LEUKOCYTE ESTERASE URINE: NEGATIVE
LYMPHOCYTES # BLD AUTO: 1.71 K/UL
LYMPHOCYTES NFR BLD AUTO: 26.6 %
MAGNESIUM SERPL-MCNC: 2.2 MG/DL
MAN DIFF?: NORMAL
MCHC RBC-ENTMCNC: 29.8 PG
MCHC RBC-ENTMCNC: 33.3 GM/DL
MCV RBC AUTO: 89.7 FL
MONOCYTES # BLD AUTO: 0.43 K/UL
MONOCYTES NFR BLD AUTO: 6.7 %
NEUTROPHILS # BLD AUTO: 4.14 K/UL
NEUTROPHILS NFR BLD AUTO: 64.4 %
NITRITE URINE: NEGATIVE
NONHDLC SERPL-MCNC: 136 MG/DL
PH URINE: 6.5
PLATELET # BLD AUTO: 341 K/UL
POTASSIUM SERPL-SCNC: 4.6 MMOL/L
PROT SERPL-MCNC: 6.9 G/DL
PROTEIN URINE: NEGATIVE
RBC # BLD: 4.86 M/UL
RBC # FLD: 11.7 %
SODIUM SERPL-SCNC: 140 MMOL/L
SPECIFIC GRAVITY URINE: 1.02
T4 FREE SERPL-MCNC: 1.3 NG/DL
T4 SERPL-MCNC: 6.8 UG/DL
TRIGL SERPL-MCNC: 174 MG/DL
TSH SERPL-ACNC: 2.87 UIU/ML
URATE SERPL-MCNC: 6.3 MG/DL
UROBILINOGEN URINE: NORMAL
VIT B12 SERPL-MCNC: 613 PG/ML
WBC # FLD AUTO: 6.43 K/UL

## 2024-04-04 ENCOUNTER — APPOINTMENT (OUTPATIENT)
Dept: CARDIOLOGY | Facility: CLINIC | Age: 44
End: 2024-04-04
Payer: COMMERCIAL

## 2024-04-04 ENCOUNTER — NON-APPOINTMENT (OUTPATIENT)
Age: 44
End: 2024-04-04

## 2024-04-04 VITALS
WEIGHT: 172 LBS | BODY MASS INDEX: 23.3 KG/M2 | OXYGEN SATURATION: 98 % | HEIGHT: 72 IN | SYSTOLIC BLOOD PRESSURE: 102 MMHG | HEART RATE: 68 BPM | DIASTOLIC BLOOD PRESSURE: 70 MMHG

## 2024-04-04 DIAGNOSIS — I51.9 HEART DISEASE, UNSPECIFIED: ICD-10-CM

## 2024-04-04 DIAGNOSIS — I50.30 UNSPECIFIED DIASTOLIC (CONGESTIVE) HEART FAILURE: ICD-10-CM

## 2024-04-04 DIAGNOSIS — Z98.890 OTHER SPECIFIED POSTPROCEDURAL STATES: ICD-10-CM

## 2024-04-04 DIAGNOSIS — R50.9 FEVER, UNSPECIFIED: ICD-10-CM

## 2024-04-04 PROCEDURE — 99214 OFFICE O/P EST MOD 30 MIN: CPT | Mod: 25

## 2024-04-04 PROCEDURE — 93000 ELECTROCARDIOGRAM COMPLETE: CPT

## 2024-04-04 RX ORDER — AMOXICILLIN 500 MG/1
500 TABLET, FILM COATED ORAL
Qty: 16 | Refills: 5 | Status: ACTIVE | COMMUNITY
Start: 2021-10-15 | End: 1900-01-01

## 2024-04-04 RX ORDER — METOPROLOL SUCCINATE 25 MG/1
25 TABLET, EXTENDED RELEASE ORAL DAILY
Qty: 90 | Refills: 3 | Status: ACTIVE | COMMUNITY
Start: 2021-09-24 | End: 1900-01-01

## 2024-04-04 NOTE — HISTORY OF PRESENT ILLNESS
[FreeTextEntry1] : He reports feeling well overall and is able to go about his usual activities without any difficulty.  He continues to exercise routinely and skis without difficulty.  He recently skied 30 miles a day without chest pains or shortness of breath. He is taking medications without bruising or Bleeding. He continues to take his oral antibiotics prior to dental procedures and asked for refill.    Prior: He has been doing well. Skiing every weekend without difficulty. He is take his medications as directed. abx prior to dental procedures.  Prior: He reports feeling well overall and is able to go about his usual activities without any difficulty. He has been able to exercise without limitations and denies any exertional chest pains or shortness of breath. He has no lightheadedness, dizziness or syncope. He is taking his metoprolol fairly consistently, but he has stopped taking his aspirin for unclear reasons. Recent LDL was 126 mg/dL.  Prior: He reports feeling well in general. Tolerating metoprolol at 50 mg without any lightheadedness, dizziness or syncope. He remains active by walking in the city and can walk 5 miles a day without any difficulty. Recently saw Dr. Myles to establish care.    Prior: He reports feeling well overall.  He has been taking 50 mg of Toprol without any lightheadedness, dizziness or syncope. He has been physically active and has been able to walk for up to 6 miles without any difficulty.  Prior: He was admitted to the hospital for heart failure with preserved ejection fraction in the setting of severe mitral regurgitation. Coronary angiography showed no significant atherosclerotic disease. Prior to surgery he tested positive for Covid 19 (despite being vaccinated) he had a benign course that included monoclonal antibodies. On September 10 he underwent an open reconstruction of mitral valve #28 Gold annuloplasty ring and P2 / Clipping, left atrial appendage. Post op Course: Post pericarditis syndrome --> Started on colchicine 0.6 mg PO BID His diuretics were recently discontinued and he reports feeling well overall. He is able to walk 1 to 1/2 miles around his neighborhood without any difficulty. His chest discomfort has been improving steadily. No orthopnea, PND or leg edema. No bleeding or bruising issues.

## 2024-04-04 NOTE — DISCUSSION/SUMMARY
[FreeTextEntry1] : He is a 43-year-old with Mitral valve prolapse (P2) with toward [place] and CHF who underwent mitral valve repair on September 10, 2021.  Postoperative pericarditis resolved.   ECG: Normal sinus rhythm, First-degree AV block. Echo: Ejection fraction in 2023 showed normal ejection fraction with no significant mitral regurgitation. Continue daily aspirin therapy. I have reduced his Toprol-XL to 25 mg daily. Annual echocardiogram. Antibiotic prophylaxis with 2 g of amoxicillin was refilled. Annual follow-up with echo and office visit. [EKG obtained to assist in diagnosis and management of assessed problem(s)] : EKG obtained to assist in diagnosis and management of assessed problem(s)

## 2024-04-19 ENCOUNTER — APPOINTMENT (OUTPATIENT)
Dept: INTERNAL MEDICINE | Facility: CLINIC | Age: 44
End: 2024-04-19
Payer: COMMERCIAL

## 2024-04-19 VITALS — WEIGHT: 169 LBS | HEIGHT: 72 IN | BODY MASS INDEX: 22.89 KG/M2

## 2024-04-19 VITALS — RESPIRATION RATE: 14 BRPM | DIASTOLIC BLOOD PRESSURE: 70 MMHG | HEART RATE: 66 BPM | SYSTOLIC BLOOD PRESSURE: 110 MMHG

## 2024-04-19 DIAGNOSIS — Z00.00 ENCOUNTER FOR GENERAL ADULT MEDICAL EXAMINATION W/OUT ABNORMAL FINDINGS: ICD-10-CM

## 2024-04-19 PROCEDURE — 36415 COLL VENOUS BLD VENIPUNCTURE: CPT

## 2024-04-19 PROCEDURE — 99396 PREV VISIT EST AGE 40-64: CPT

## 2024-04-20 LAB
25(OH)D3 SERPL-MCNC: 20.6 NG/ML
ALBUMIN SERPL ELPH-MCNC: 5 G/DL
ALP BLD-CCNC: 66 U/L
ALT SERPL-CCNC: 11 U/L
ANION GAP SERPL CALC-SCNC: 16 MMOL/L
APPEARANCE: CLEAR
AST SERPL-CCNC: 14 U/L
BASOPHILS # BLD AUTO: 0.03 K/UL
BASOPHILS NFR BLD AUTO: 0.5 %
BILIRUB SERPL-MCNC: 0.5 MG/DL
BILIRUBIN URINE: NEGATIVE
BLOOD URINE: NEGATIVE
BUN SERPL-MCNC: 18 MG/DL
CALCIUM SERPL-MCNC: 9.9 MG/DL
CHLORIDE SERPL-SCNC: 101 MMOL/L
CHOLEST SERPL-MCNC: 169 MG/DL
CO2 SERPL-SCNC: 22 MMOL/L
COLOR: NORMAL
CREAT SERPL-MCNC: 1.01 MG/DL
EGFR: 96 ML/MIN/1.73M2
EOSINOPHIL # BLD AUTO: 0.05 K/UL
EOSINOPHIL NFR BLD AUTO: 0.8 %
ESTIMATED AVERAGE GLUCOSE: 111 MG/DL
FOLATE SERPL-MCNC: 11 NG/ML
GLUCOSE QUALITATIVE U: NEGATIVE MG/DL
GLUCOSE SERPL-MCNC: 88 MG/DL
HBA1C MFR BLD HPLC: 5.5 %
HCT VFR BLD CALC: 45.3 %
HDLC SERPL-MCNC: 55 MG/DL
HGB BLD-MCNC: 14.7 G/DL
IMM GRANULOCYTES NFR BLD AUTO: 0.2 %
KETONES URINE: NEGATIVE MG/DL
LDLC SERPL CALC-MCNC: 99 MG/DL
LEUKOCYTE ESTERASE URINE: NEGATIVE
LYMPHOCYTES # BLD AUTO: 1.75 K/UL
LYMPHOCYTES NFR BLD AUTO: 28.4 %
MAGNESIUM SERPL-MCNC: 2.2 MG/DL
MAN DIFF?: NORMAL
MCHC RBC-ENTMCNC: 29.3 PG
MCHC RBC-ENTMCNC: 32.5 GM/DL
MCV RBC AUTO: 90.2 FL
MONOCYTES # BLD AUTO: 0.45 K/UL
MONOCYTES NFR BLD AUTO: 7.3 %
NEUTROPHILS # BLD AUTO: 3.88 K/UL
NEUTROPHILS NFR BLD AUTO: 62.8 %
NITRITE URINE: NEGATIVE
NONHDLC SERPL-MCNC: 114 MG/DL
PH URINE: 7
PLATELET # BLD AUTO: 263 K/UL
POTASSIUM SERPL-SCNC: 3.9 MMOL/L
PROT SERPL-MCNC: 7.3 G/DL
PROTEIN URINE: NORMAL MG/DL
RBC # BLD: 5.02 M/UL
RBC # FLD: 12 %
SODIUM SERPL-SCNC: 140 MMOL/L
SPECIFIC GRAVITY URINE: >1.03
T4 FREE SERPL-MCNC: 1.3 NG/DL
T4 SERPL-MCNC: 7.5 UG/DL
TRIGL SERPL-MCNC: 82 MG/DL
TSH SERPL-ACNC: 1.68 UIU/ML
UROBILINOGEN URINE: 1 MG/DL
VIT B12 SERPL-MCNC: 518 PG/ML
WBC # FLD AUTO: 6.17 K/UL

## 2024-04-26 ENCOUNTER — APPOINTMENT (OUTPATIENT)
Dept: CARDIOLOGY | Facility: CLINIC | Age: 44
End: 2024-04-26

## 2024-07-26 NOTE — ED ADULT NURSE NOTE - NS ED NURSE LEVEL OF CONSCIOUSNESS SPEECH
Interval History: Seen this AM at bedside. No acute events reported overnight. Feeling better.  Family present at bedside    Review of Systems  Objective:     Vital Signs (Most Recent):  Temp: 98.1 °F (36.7 °C) (07/25/24 2331)  Pulse: 96 (07/26/24 1100)  Resp: 18 (07/25/24 2331)  BP: (!) 141/99 (07/26/24 1201)  SpO2: 95 % (07/25/24 2331) Vital Signs (24h Range):  Temp:  [97.8 °F (36.6 °C)-98.1 °F (36.7 °C)] 98.1 °F (36.7 °C)  Pulse:  [57-96] 96  Resp:  [18] 18  SpO2:  [95 %] 95 %  BP: (127-141)/(88-99) 141/99     Weight: 111 kg (244 lb 11.4 oz)  Body mass index is 35.11 kg/m².    Intake/Output Summary (Last 24 hours) at 7/26/2024 1252  Last data filed at 7/26/2024 0754  Gross per 24 hour   Intake --   Output 4250 ml   Net -4250 ml         Physical Exam  Constitutional:       General: He is not in acute distress.     Appearance: Normal appearance. He is not ill-appearing, toxic-appearing or diaphoretic.   HENT:      Head: Normocephalic and atraumatic.      Nose: Nose normal.      Mouth/Throat:      Mouth: Mucous membranes are moist.   Eyes:      Extraocular Movements: Extraocular movements intact.      Conjunctiva/sclera: Conjunctivae normal.      Pupils: Pupils are equal, round, and reactive to light.   Cardiovascular:      Rate and Rhythm: Normal rate and regular rhythm.      Pulses: Normal pulses.      Heart sounds: Normal heart sounds. No murmur heard.     No gallop.   Pulmonary:      Effort: Pulmonary effort is normal. No respiratory distress.      Breath sounds: Normal breath sounds. No wheezing or rales.   Abdominal:      General: Abdomen is flat. Bowel sounds are normal. There is no distension.      Palpations: Abdomen is soft.      Tenderness: There is no abdominal tenderness. There is no guarding.   Musculoskeletal:         General: No swelling. Normal range of motion.      Cervical back: Normal range of motion and neck supple.   Skin:     General: Skin is warm and dry.      Capillary Refill: Capillary  refill takes less than 2 seconds.   Neurological:      General: No focal deficit present.      Mental Status: He is alert and oriented to person, place, and time. Mental status is at baseline.   Psychiatric:         Mood and Affect: Mood normal.         Behavior: Behavior normal.         Thought Content: Thought content normal.         Judgment: Judgment normal.             Significant Labs: All pertinent labs within the past 24 hours have been reviewed.    Significant Imaging: I have reviewed all pertinent imaging results/findings within the past 24 hours.   Speaking Coherently

## 2025-03-21 ENCOUNTER — NON-APPOINTMENT (OUTPATIENT)
Age: 45
End: 2025-03-21

## 2025-03-28 ENCOUNTER — APPOINTMENT (OUTPATIENT)
Dept: INTERNAL MEDICINE | Facility: CLINIC | Age: 45
End: 2025-03-28
Payer: COMMERCIAL

## 2025-03-28 ENCOUNTER — NON-APPOINTMENT (OUTPATIENT)
Age: 45
End: 2025-03-28

## 2025-03-28 VITALS
HEART RATE: 87 BPM | OXYGEN SATURATION: 97 % | WEIGHT: 170 LBS | DIASTOLIC BLOOD PRESSURE: 92 MMHG | SYSTOLIC BLOOD PRESSURE: 137 MMHG | BODY MASS INDEX: 23.06 KG/M2

## 2025-03-28 DIAGNOSIS — Z00.00 ENCOUNTER FOR GENERAL ADULT MEDICAL EXAMINATION W/OUT ABNORMAL FINDINGS: ICD-10-CM

## 2025-03-28 DIAGNOSIS — Z13.1 ENCOUNTER FOR SCREENING FOR DIABETES MELLITUS: ICD-10-CM

## 2025-03-28 DIAGNOSIS — Z98.890 OTHER SPECIFIED POSTPROCEDURAL STATES: ICD-10-CM

## 2025-03-28 PROCEDURE — G0444 DEPRESSION SCREEN ANNUAL: CPT | Mod: 59

## 2025-03-28 PROCEDURE — 99213 OFFICE O/P EST LOW 20 MIN: CPT

## 2025-03-28 PROCEDURE — G2211 COMPLEX E/M VISIT ADD ON: CPT | Mod: NC

## 2025-03-28 PROCEDURE — 36415 COLL VENOUS BLD VENIPUNCTURE: CPT

## 2025-03-29 LAB
ALBUMIN SERPL ELPH-MCNC: 4.7 G/DL
ALP BLD-CCNC: 77 U/L
ALT SERPL-CCNC: 11 U/L
ANION GAP SERPL CALC-SCNC: 14 MMOL/L
AST SERPL-CCNC: 14 U/L
BASOPHILS # BLD AUTO: 0.03 K/UL
BASOPHILS NFR BLD AUTO: 0.4 %
BILIRUB SERPL-MCNC: 0.3 MG/DL
BUN SERPL-MCNC: 20 MG/DL
CALCIUM SERPL-MCNC: 9.7 MG/DL
CHLORIDE SERPL-SCNC: 101 MMOL/L
CHOLEST SERPL-MCNC: 170 MG/DL
CO2 SERPL-SCNC: 25 MMOL/L
CREAT SERPL-MCNC: 0.97 MG/DL
EGFRCR SERPLBLD CKD-EPI 2021: 100 ML/MIN/1.73M2
EOSINOPHIL # BLD AUTO: 0.08 K/UL
EOSINOPHIL NFR BLD AUTO: 1.2 %
ESTIMATED AVERAGE GLUCOSE: 114 MG/DL
GLUCOSE SERPL-MCNC: 88 MG/DL
HBA1C MFR BLD HPLC: 5.6 %
HCT VFR BLD CALC: 44.2 %
HCV AB SER QL: NONREACTIVE
HCV S/CO RATIO: 0.14 S/CO
HDLC SERPL-MCNC: 58 MG/DL
HGB BLD-MCNC: 14.6 G/DL
IMM GRANULOCYTES NFR BLD AUTO: 0.3 %
LDLC SERPL-MCNC: 97 MG/DL
LYMPHOCYTES # BLD AUTO: 1.8 K/UL
LYMPHOCYTES NFR BLD AUTO: 26.7 %
MAN DIFF?: NORMAL
MCHC RBC-ENTMCNC: 29.2 PG
MCHC RBC-ENTMCNC: 33 G/DL
MCV RBC AUTO: 88.4 FL
MONOCYTES # BLD AUTO: 0.44 K/UL
MONOCYTES NFR BLD AUTO: 6.5 %
NEUTROPHILS # BLD AUTO: 4.37 K/UL
NEUTROPHILS NFR BLD AUTO: 64.9 %
NONHDLC SERPL-MCNC: 112 MG/DL
PLATELET # BLD AUTO: 252 K/UL
POTASSIUM SERPL-SCNC: 3.7 MMOL/L
PROT SERPL-MCNC: 7.1 G/DL
RBC # BLD: 5 M/UL
RBC # FLD: 12.5 %
SODIUM SERPL-SCNC: 140 MMOL/L
TRIGL SERPL-MCNC: 82 MG/DL
TSH SERPL-ACNC: 3.09 UIU/ML
WBC # FLD AUTO: 6.74 K/UL

## 2025-04-15 ENCOUNTER — APPOINTMENT (OUTPATIENT)
Dept: CARDIOLOGY | Facility: CLINIC | Age: 45
End: 2025-04-15

## 2025-05-22 ENCOUNTER — APPOINTMENT (OUTPATIENT)
Dept: INTERNAL MEDICINE | Facility: CLINIC | Age: 45
End: 2025-05-22

## 2025-06-20 ENCOUNTER — APPOINTMENT (OUTPATIENT)
Dept: CARDIOLOGY | Facility: CLINIC | Age: 45
End: 2025-06-20

## 2025-08-01 ENCOUNTER — APPOINTMENT (OUTPATIENT)
Dept: CARDIOLOGY | Facility: CLINIC | Age: 45
End: 2025-08-01
Payer: COMMERCIAL

## 2025-08-01 ENCOUNTER — APPOINTMENT (OUTPATIENT)
Dept: INTERNAL MEDICINE | Facility: CLINIC | Age: 45
End: 2025-08-01

## 2025-08-01 VITALS
WEIGHT: 166 LBS | BODY MASS INDEX: 22.51 KG/M2 | HEART RATE: 84 BPM | OXYGEN SATURATION: 100 % | SYSTOLIC BLOOD PRESSURE: 122 MMHG | DIASTOLIC BLOOD PRESSURE: 80 MMHG

## 2025-08-01 VITALS — BODY MASS INDEX: 22.75 KG/M2 | WEIGHT: 168 LBS | HEIGHT: 72 IN

## 2025-08-01 DIAGNOSIS — Z98.890 OTHER SPECIFIED POSTPROCEDURAL STATES: ICD-10-CM

## 2025-08-01 DIAGNOSIS — Z00.00 ENCOUNTER FOR GENERAL ADULT MEDICAL EXAMINATION W/OUT ABNORMAL FINDINGS: ICD-10-CM

## 2025-08-01 PROCEDURE — 93000 ELECTROCARDIOGRAM COMPLETE: CPT

## 2025-08-01 PROCEDURE — 99396 PREV VISIT EST AGE 40-64: CPT

## 2025-08-01 PROCEDURE — 36415 COLL VENOUS BLD VENIPUNCTURE: CPT

## 2025-08-01 PROCEDURE — 99214 OFFICE O/P EST MOD 30 MIN: CPT | Mod: 25

## 2025-08-02 LAB
ALBUMIN SERPL ELPH-MCNC: 4.8 G/DL
ALP BLD-CCNC: 82 U/L
ALT SERPL-CCNC: 12 U/L
ANION GAP SERPL CALC-SCNC: 14 MMOL/L
APPEARANCE: CLEAR
AST SERPL-CCNC: 16 U/L
BASOPHILS # BLD AUTO: 0.03 K/UL
BASOPHILS NFR BLD AUTO: 0.5 %
BILIRUB SERPL-MCNC: 0.4 MG/DL
BILIRUBIN URINE: NEGATIVE
BLOOD URINE: NEGATIVE
BUN SERPL-MCNC: 17 MG/DL
CALCIUM SERPL-MCNC: 9.8 MG/DL
CHLORIDE SERPL-SCNC: 101 MMOL/L
CHOLEST SERPL-MCNC: 162 MG/DL
CO2 SERPL-SCNC: 24 MMOL/L
COLOR: YELLOW
CREAT SERPL-MCNC: 1.05 MG/DL
EGFRCR SERPLBLD CKD-EPI 2021: 90 ML/MIN/1.73M2
EOSINOPHIL # BLD AUTO: 0.05 K/UL
EOSINOPHIL NFR BLD AUTO: 0.8 %
ESTIMATED AVERAGE GLUCOSE: 114 MG/DL
FOLATE SERPL-MCNC: 8.4 NG/ML
GLUCOSE QUALITATIVE U: NEGATIVE MG/DL
GLUCOSE SERPL-MCNC: 134 MG/DL
HBA1C MFR BLD HPLC: 5.6 %
HCT VFR BLD CALC: 42.9 %
HDLC SERPL-MCNC: 57 MG/DL
HGB BLD-MCNC: 14.6 G/DL
IMM GRANULOCYTES NFR BLD AUTO: 0.2 %
KETONES URINE: NEGATIVE MG/DL
LDLC SERPL-MCNC: 84 MG/DL
LEUKOCYTE ESTERASE URINE: NEGATIVE
LYMPHOCYTES # BLD AUTO: 1.66 K/UL
LYMPHOCYTES NFR BLD AUTO: 25.1 %
MAGNESIUM SERPL-MCNC: 2.2 MG/DL
MAN DIFF?: NORMAL
MCHC RBC-ENTMCNC: 29.7 PG
MCHC RBC-ENTMCNC: 34 G/DL
MCV RBC AUTO: 87.2 FL
MONOCYTES # BLD AUTO: 0.49 K/UL
MONOCYTES NFR BLD AUTO: 7.4 %
NEUTROPHILS # BLD AUTO: 4.37 K/UL
NEUTROPHILS NFR BLD AUTO: 66 %
NITRITE URINE: NEGATIVE
NONHDLC SERPL-MCNC: 105 MG/DL
PH URINE: 6.5
PLATELET # BLD AUTO: 256 K/UL
POTASSIUM SERPL-SCNC: 3.6 MMOL/L
PROT SERPL-MCNC: 7.1 G/DL
PROTEIN URINE: NEGATIVE MG/DL
RBC # BLD: 4.92 M/UL
RBC # FLD: 12.1 %
SODIUM SERPL-SCNC: 140 MMOL/L
SPECIFIC GRAVITY URINE: 1.02
T4 FREE SERPL-MCNC: 1.2 NG/DL
T4 SERPL-MCNC: 6.6 UG/DL
TRIGL SERPL-MCNC: 118 MG/DL
TSH SERPL-ACNC: 2.16 UIU/ML
UROBILINOGEN URINE: 1 MG/DL
VIT B12 SERPL-MCNC: 517 PG/ML
WBC # FLD AUTO: 6.61 K/UL

## 2025-09-12 ENCOUNTER — APPOINTMENT (OUTPATIENT)
Dept: CARDIOLOGY | Facility: CLINIC | Age: 45
End: 2025-09-12
Payer: COMMERCIAL

## 2025-09-12 PROCEDURE — 93306 TTE W/DOPPLER COMPLETE: CPT

## 2025-09-16 ENCOUNTER — TRANSCRIPTION ENCOUNTER (OUTPATIENT)
Age: 45
End: 2025-09-16